# Patient Record
Sex: MALE | Race: WHITE | NOT HISPANIC OR LATINO | Employment: OTHER | URBAN - METROPOLITAN AREA
[De-identification: names, ages, dates, MRNs, and addresses within clinical notes are randomized per-mention and may not be internally consistent; named-entity substitution may affect disease eponyms.]

---

## 2017-01-10 ENCOUNTER — ALLSCRIPTS OFFICE VISIT (OUTPATIENT)
Dept: OTHER | Facility: OTHER | Age: 72
End: 2017-01-10

## 2017-02-11 ENCOUNTER — LAB CONVERSION - ENCOUNTER (OUTPATIENT)
Dept: OTHER | Facility: OTHER | Age: 72
End: 2017-02-11

## 2017-02-21 ENCOUNTER — ALLSCRIPTS OFFICE VISIT (OUTPATIENT)
Dept: OTHER | Facility: OTHER | Age: 72
End: 2017-02-21

## 2017-04-15 ENCOUNTER — ALLSCRIPTS OFFICE VISIT (OUTPATIENT)
Dept: OTHER | Facility: OTHER | Age: 72
End: 2017-04-15

## 2017-04-24 ENCOUNTER — LAB CONVERSION - ENCOUNTER (OUTPATIENT)
Dept: OTHER | Facility: OTHER | Age: 72
End: 2017-04-24

## 2017-04-24 LAB
PARASITE ID (HISTORICAL): NORMAL
TICK -LYME (BORRELIA SPP) DNA, QL, (HISTORICAL): DETECTED

## 2017-05-01 DIAGNOSIS — I10 ESSENTIAL (PRIMARY) HYPERTENSION: ICD-10-CM

## 2017-05-03 ENCOUNTER — GENERIC CONVERSION - ENCOUNTER (OUTPATIENT)
Dept: OTHER | Facility: OTHER | Age: 72
End: 2017-05-03

## 2017-05-03 LAB
FECAL GLOBIN BY IMMUNOCHEM (HISTORICAL): NORMAL
SOURCE (HISTORICAL): NORMAL

## 2017-05-22 ENCOUNTER — ALLSCRIPTS OFFICE VISIT (OUTPATIENT)
Dept: OTHER | Facility: OTHER | Age: 72
End: 2017-05-22

## 2017-10-24 ENCOUNTER — LAB CONVERSION - ENCOUNTER (OUTPATIENT)
Dept: OTHER | Facility: OTHER | Age: 72
End: 2017-10-24

## 2017-10-24 LAB
25(OH)D3 SERPL-MCNC: 42 NG/ML (ref 30–100)
A/G RATIO (HISTORICAL): 1.8 (CALC) (ref 1–2.5)
ALBUMIN SERPL BCP-MCNC: 4.1 G/DL (ref 3.6–5.1)
ALP SERPL-CCNC: 56 U/L (ref 40–115)
ALT SERPL W P-5'-P-CCNC: 14 U/L (ref 9–46)
AST SERPL W P-5'-P-CCNC: 17 U/L (ref 10–35)
BACTERIA UR QL AUTO: NORMAL /HPF
BASOPHILS # BLD AUTO: 0.7 %
BASOPHILS # BLD AUTO: 43 CELLS/UL (ref 0–200)
BILIRUB SERPL-MCNC: 0.6 MG/DL (ref 0.2–1.2)
BILIRUB UR QL STRIP: NEGATIVE
BUN SERPL-MCNC: 22 MG/DL (ref 7–25)
BUN/CREA RATIO (HISTORICAL): 15 (CALC) (ref 6–22)
CALCIUM SERPL-MCNC: 9.8 MG/DL (ref 8.6–10.3)
CHLORIDE SERPL-SCNC: 105 MMOL/L (ref 98–110)
CO2 SERPL-SCNC: 30 MMOL/L (ref 20–31)
COLOR UR: YELLOW
COMMENT (HISTORICAL): CLEAR
CREAT SERPL-MCNC: 1.47 MG/DL (ref 0.7–1.18)
DEPRECATED RDW RBC AUTO: 12.9 % (ref 11–15)
EGFR AFRICAN AMERICAN (HISTORICAL): 54 ML/MIN/1.73M2
EGFR-AMERICAN CALC (HISTORICAL): 46 ML/MIN/1.73M2
EOSINOPHIL # BLD AUTO: 146 CELLS/UL (ref 15–500)
EOSINOPHIL # BLD AUTO: 2.4 %
FECAL OCCULT BLOOD DIAGNOSTIC (HISTORICAL): NEGATIVE
GAMMA GLOBULIN (HISTORICAL): 2.3 G/DL (CALC) (ref 1.9–3.7)
GLUCOSE (HISTORICAL): 89 MG/DL (ref 65–99)
GLUCOSE (HISTORICAL): NEGATIVE
HCT VFR BLD AUTO: 45.2 % (ref 38.5–50)
HGB BLD-MCNC: 15.3 G/DL (ref 13.2–17.1)
HYALINE CASTS #/AREA URNS LPF: NORMAL /LPF
KETONES UR STRIP-MCNC: NEGATIVE MG/DL
LEUKOCYTE ESTERASE UR QL STRIP: NEGATIVE
LYMPHOCYTES # BLD AUTO: 2288 CELLS/UL (ref 850–3900)
LYMPHOCYTES # BLD AUTO: 37.5 %
MCH RBC QN AUTO: 30.7 PG (ref 27–33)
MCHC RBC AUTO-ENTMCNC: 33.8 G/DL (ref 32–36)
MCV RBC AUTO: 90.8 FL (ref 80–100)
MONOCYTES # BLD AUTO: 549 CELLS/UL (ref 200–950)
MONOCYTES (HISTORICAL): 9 %
NEUTROPHILS # BLD AUTO: 3074 CELLS/UL (ref 1500–7800)
NEUTROPHILS # BLD AUTO: 50.4 %
NITRITE UR QL STRIP: NEGATIVE
PH UR STRIP.AUTO: 6 [PH] (ref 5–8)
PLATELET # BLD AUTO: 248 THOUSAND/UL (ref 140–400)
PMV BLD AUTO: 10.6 FL (ref 7.5–12.5)
POTASSIUM SERPL-SCNC: 4.3 MMOL/L (ref 3.5–5.3)
PROSTATE SPECIFIC ANTIGEN TOTAL (HISTORICAL): 1.5 NG/ML
PROT UR STRIP-MCNC: NEGATIVE MG/DL
RBC # BLD AUTO: 4.98 MILLION/UL (ref 4.2–5.8)
RBC (HISTORICAL): NORMAL /HPF
SODIUM SERPL-SCNC: 141 MMOL/L (ref 135–146)
SP GR UR STRIP.AUTO: 1.02 (ref 1–1.03)
SQUAMOUS EPITHELIAL CELLS (HISTORICAL): NORMAL /HPF
TOTAL PROTEIN (HISTORICAL): 6.4 G/DL (ref 6.1–8.1)
TSH SERPL DL<=0.05 MIU/L-ACNC: 3.39 MIU/L (ref 0.4–4.5)
WBC # BLD AUTO: 6.1 THOUSAND/UL (ref 3.8–10.8)
WBC # BLD AUTO: NORMAL /HPF

## 2017-10-30 ENCOUNTER — ALLSCRIPTS OFFICE VISIT (OUTPATIENT)
Dept: OTHER | Facility: OTHER | Age: 72
End: 2017-10-30

## 2017-10-31 ENCOUNTER — ALLSCRIPTS OFFICE VISIT (OUTPATIENT)
Dept: OTHER | Facility: OTHER | Age: 72
End: 2017-10-31

## 2017-10-31 DIAGNOSIS — Z53.29 PROCEDURE AND TREATMENT NOT CARRIED OUT BECAUSE OF PATIENT'S DECISION FOR OTHER REASONS: ICD-10-CM

## 2017-10-31 DIAGNOSIS — E04.1 NONTOXIC SINGLE THYROID NODULE: ICD-10-CM

## 2017-10-31 DIAGNOSIS — N50.9 DISORDER OF MALE GENITAL ORGANS: ICD-10-CM

## 2017-10-31 DIAGNOSIS — Z13.6 ENCOUNTER FOR SCREENING FOR CARDIOVASCULAR DISORDERS: ICD-10-CM

## 2017-10-31 NOTE — PROGRESS NOTES
Assessment  1  Tick bite of lower back (911 4,E906 4) (E44 875M,Y23  XXXA)    Plan  Fatigue, PMH: Tick bite of groin    · (1) LYME ANTIBODY PROFILE W/REFLEX TO WESTERN BLOT; Status:Canceled;   Tick bite of lower back    · Doxycycline Hyclate 100 MG Oral Capsule; TAKE 2 CAPSULE Once    Discussion/Summary    Tick bite right lower back - area cleaned, tick removed and sent to path per patient request, will treat with doxy 100 mg x 2 now with food, will call with path report  as needed  Possible side effects of new medications were reviewed with the patient/guardian today  The treatment plan was reviewed with the patient/guardian  The patient/guardian understands and agrees with the treatment plan      Chief Complaint  Pt presents to the office with c/o a tick on his back  due 02/02/18due 10/06/17DUE 08/25/18      History of Present Illness  HPI: Patient here for tick bite  Wife found it this morning on patient  Patient has a long history of Lyme and requesting tick be sent out  Active Problems  1  Advance directive in chart (V49 89) (Z78 9)   2  Bilateral hearing loss (389 9) (H91 93)   3  Colon cancer screening (V76 51) (Z12 11)   4  Colonoscopy (Fiberoptic) Screening   5  Creatinine elevation (790 99) (R79 89)   6  Fatigue (780 79) (R53 83)   7  Hypertension (401 9) (I10)   8  Laboratory examination ordered as part of a routine general medical examination   (V72 62) (Z00 00)   9  Need for prophylactic vaccination and inoculation against influenza (V04 81) (Z23)   10  Nervousness (799 21) (R45 0)   11  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)   12  Obstructive sleep apnea (327 23) (G47 33)   13  Osteopenia (733 90) (M85 80)   14  Screening for genitourinary condition (V81 6) (Z13 89)   15  Special screening examination for neoplasm of prostate (V76 44) (Z12 5)   16  Uncomplicated alcohol abuse (305 00) (F10 10)   17  Visit For: Screening Exam Neurological Disorders (V80 09)    Past Medical History  1  History of Allergic Reaction (995 3)   2  History of Bell's Palsy Due To Lyme Disease (351 0)   3  History of Blood pressure elevated (401 9) (I10)   4  History of Calculus of salivary gland (527 5) (K11 5)   5  History of Calculus of salivary gland (527 5) (K11 5)   6  History of Decreased body height (781 91) (R29 890)   7  History of abdominal pain (V13 89) (Z87 898)   8  History of abnormal weight loss (V13 89) (Z87 898)   9  History of atrial fibrillation (V12 59) (Z86 79)   10  History of blurred vision (V12 49) (Z86 69)   11  History of lymphadenitis (V13 89) (Z87 898)   12  History of Lyme disease (088 81) (A69 20)   13  History of Mid back pain (724 5) (M54 9)   14  History of Multiple Nonvenomous Insect Bites (919 4)   15  History of Need for pneumococcal vaccination (V03 82) (Z23)   16  Need for prophylactic vaccination and inoculation against influenza (V04 81) (Z23)   17  History of Need for vaccine for TD (tetanus-diphtheria) (V06 5) (Z23)   18  History of Open Wound Of The Foot (892 0)   19  History of Palpitations (785 1) (R00 2)   20  History of Sinus Tachycardia (427 89)   21  History of Tachycardia (785 0) (R00 0)   22  History of Testes Mass (___cm) (608 89)   23  Tick bite of groin (911 4,E906 4) (A73 796K,P56  XXXA)   24  History of Tick Bites (919 4)  Active Problems And Past Medical History Reviewed: The active problems and past medical history were reviewed and updated today  Family History  Mother    1  Family history of Dementia   2  Family history of Mother  At Age 80   3  Family history of Uterine Cancer (V16 49)  Father    4  Family history of cardiac disorder (V17 49) (Z82 49)   5  Family history of thyroid disease (V18 19) (Z83 49)   6  Patient's father is  (V24 11) (Z80 80)  Family History Reviewed: The family history was reviewed and updated today  Social History   · Advance directive in chart (V49 89) (Z78 9)   · Drinks coffee   · Drinks 1 cup a day  · Former smoker (C43 33) (G11 178)   · when he was a teenager   · Has smoke detectors   · No drug use   · Occupation   · 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   · Social alcohol use (Z78 9)   · Drinks 2 beers a week   · Denied: History of Uses Safety Equipment - Seatbelts  The social history was reviewed and updated today  The social history was reviewed and is unchanged  Surgical History  1  Denied: History Of Prior Surgery  Surgical History Reviewed: The surgical history was reviewed and updated today  Current Meds   1  HydroCHLOROthiazide 25 MG Oral Tablet; TAKE 1 TABLET BY MOUTH EVERY DAY   WITH LISINOPRIL; Therapy: 01FGK1628 to (72 240 26 09)  Requested for: 77Krf0724; Last   Rx:12Uhd4296 Ordered   2  Lisinopril 20 MG Oral Tablet; Take one tablet daily; Therapy: 34ZDQ8908 to (Evaluate:29Mqh2685)  Requested for: 87Osg4379; Last   Rx:36Vnw6280 Ordered    The medication list was reviewed and updated today  Allergies  1  Toprol XL TB24  2  No Known Environmental Allergies   3  No Known Food Allergies    Vitals   Recorded: 48ZUN2496 01:33PM   Temperature 97 9 F, Oral   Heart Rate 76, R Radial   Pulse Quality Normal, R Radial   Respiration Quality Normal   Respiration 16   Systolic 067, RUE, Sitting   Diastolic 68, RUE, Sitting   Height 5 ft 9 in   Weight 162 lb    BMI Calculated 23 92   BSA Calculated 1 89     Physical Exam    Constitutional   General appearance: No acute distress, well appearing and well nourished  Pulmonary   Respiratory effort: No increased work of breathing or signs of respiratory distress  Auscultation of lungs: Clear to auscultation, equal breath sounds bilaterally, no wheezes, no rales, no rhonci  Cardiovascular   Palpation of heart: Normal PMI, no thrills  Auscultation of heart: Normal rate and rhythm, normal S1 and S2, without murmurs      Skin   Skin and subcutaneous tissue: Abnormal  -- right side lower back with engroged black tight, wheel like lesion surround that blanches on contact     Psychiatric   Orientation to person, place and time: Normal     Mood and affect: Normal          Future Appointments    Date/Time Provider Specialty Site   10/31/2017 01:30 PM Jaron Santiago DO [address]     Signatures   Electronically signed by : Lux Martinez [facility]; Oct 30 2017  1:57PM EST                       (Author)    Electronically signed by : MARCIE Dela Cruz ; Oct 30 2017  5:10PM EST                       (Author)

## 2017-11-06 ENCOUNTER — LAB CONVERSION - ENCOUNTER (OUTPATIENT)
Dept: OTHER | Facility: OTHER | Age: 72
End: 2017-11-06

## 2017-11-06 LAB
PARASITE ID (HISTORICAL): NORMAL
TICK -LYME (BORRELIA SPP) DNA, QL, (HISTORICAL): DETECTED

## 2017-11-07 ENCOUNTER — GENERIC CONVERSION - ENCOUNTER (OUTPATIENT)
Dept: OTHER | Facility: OTHER | Age: 72
End: 2017-11-07

## 2017-11-13 ENCOUNTER — HOSPITAL ENCOUNTER (OUTPATIENT)
Dept: RADIOLOGY | Facility: HOSPITAL | Age: 72
Discharge: HOME | End: 2017-11-13
Payer: COMMERCIAL

## 2017-11-13 DIAGNOSIS — Z13.6 ENCOUNTER FOR SCREENING FOR CARDIOVASCULAR DISORDERS: ICD-10-CM

## 2017-11-13 DIAGNOSIS — Z53.29 PROCEDURE AND TREATMENT NOT CARRIED OUT BECAUSE OF PATIENT'S DECISION FOR OTHER REASONS: ICD-10-CM

## 2017-11-13 DIAGNOSIS — E04.1 NONTOXIC SINGLE THYROID NODULE: ICD-10-CM

## 2017-11-13 PROCEDURE — 76706 US ABDL AORTA SCREEN AAA: CPT

## 2017-11-13 PROCEDURE — 76536 US EXAM OF HEAD AND NECK: CPT

## 2017-11-16 ENCOUNTER — ALLSCRIPTS OFFICE VISIT (OUTPATIENT)
Dept: OTHER | Facility: OTHER | Age: 72
End: 2017-11-16

## 2017-11-27 ENCOUNTER — GENERIC CONVERSION - ENCOUNTER (OUTPATIENT)
Dept: OTHER | Facility: OTHER | Age: 72
End: 2017-11-27

## 2017-11-29 ENCOUNTER — HOSPITAL ENCOUNTER (OUTPATIENT)
Dept: RADIOLOGY | Facility: HOSPITAL | Age: 72
Discharge: HOME | End: 2017-11-29
Payer: COMMERCIAL

## 2017-11-29 DIAGNOSIS — E04.1 NONTOXIC SINGLE THYROID NODULE: ICD-10-CM

## 2017-11-29 LAB
LYME 18 KD IGG (HISTORICAL): REACTIVE
LYME 23 KD IGG (HISTORICAL): ABNORMAL
LYME 23 KD IGM (HISTORICAL): REACTIVE
LYME 28 KD IGG (HISTORICAL): ABNORMAL
LYME 30 KD IGG (HISTORICAL): ABNORMAL
LYME 39 KD IGG (HISTORICAL): REACTIVE
LYME 39 KD IGM (HISTORICAL): ABNORMAL
LYME 41 KD IGG (HISTORICAL): REACTIVE
LYME 41 KD IGM (HISTORICAL): ABNORMAL
LYME 45 KD IGG (HISTORICAL): REACTIVE
LYME 58 KD IGG (HISTORICAL): REACTIVE
LYME 66 KD IGG (HISTORICAL): ABNORMAL
LYME 93 KD IGG (HISTORICAL): ABNORMAL
LYME IGG (HISTORICAL): POSITIVE
LYME IGM (HISTORICAL): NEGATIVE

## 2017-11-29 PROCEDURE — 10022 HB FNA W/IMAGE: CPT

## 2017-11-29 PROCEDURE — 88172 CYTP DX EVAL FNA 1ST EA SITE: CPT | Performed by: FAMILY MEDICINE

## 2017-11-29 PROCEDURE — 76942 ECHO GUIDE FOR BIOPSY: CPT

## 2017-11-29 PROCEDURE — 88173 CYTOPATH EVAL FNA REPORT: CPT | Performed by: FAMILY MEDICINE

## 2017-11-29 RX ORDER — LIDOCAINE HYDROCHLORIDE 10 MG/ML
3 INJECTION, SOLUTION INFILTRATION; PERINEURAL ONCE
Status: COMPLETED | OUTPATIENT
Start: 2017-11-29 | End: 2017-11-29

## 2017-11-29 RX ADMIN — LIDOCAINE HYDROCHLORIDE 3 ML: 10 INJECTION, SOLUTION INFILTRATION; PERINEURAL at 09:45

## 2017-12-11 ENCOUNTER — GENERIC CONVERSION - ENCOUNTER (OUTPATIENT)
Dept: OTHER | Facility: OTHER | Age: 72
End: 2017-12-11

## 2018-01-10 NOTE — MISCELLANEOUS
Message   Recorded as Task   Date: 10/22/2016 05:12 PM, Created By: Cheryl Maria   Task Name: Call Back   Assigned To: Abbey Berry   Regarding Patient: Vanda Brandon, Status: Active   CommentArnaldo Mahoney - 22 Oct 2016 5:12 PM     TASK CREATED  Please call patient to tell him that his ultrasound of his thyroid is unchanged  This is no change since 2014   Regine Lu - 24 Oct 2016 9:38 AM     TASK REPLIED TO: Previously Assigned To Regine Lu  Pt was informed   MRP        Signatures   Electronically signed by : Willam Higginbotham DO; Oct 24 2016 12:52PM EST                       (Author)

## 2018-01-11 NOTE — PROGRESS NOTES
Assessment    1  Encounter for preventive health examination (V70 0) (Z00 00)    Plan  Colon cancer screening    · (Q) FECAL GLOBIN BY IMMUNOCHEM  (MEDICARE); Status:Permanent Deferral -  Retrospective By Protocol Authorization,Patient request;    · (1) OCCULT BLOOD, FECAL IMMUNOCHEMICAL TEST ; every 1 year; Next 16AIZ8276;  Status:Discontinued  Health Maintenance    · Medicare Annual Wellness Visit; Status:Complete - Retrospective By Protocol  Authorization;   Done: 56QAV1352 09:10AM   · Call [phone] if: You have any warning signs of skin cancer ; Status:Complete;    Done: 44BSL7525   · Seek Immediate Medical Attention if: You experience a new kind of chest pain (angina)  or pressure ; Status:Complete;   Done: 50OLT4247   · Eat a low fat and low cholesterol diet ; Status:Complete;   Done: 12HPW8368   · The plan of care for falls is detailed in the plan and/or discussion section of today's note ;  Status:Complete;   Done: 21EJX5510   · There are many exercise options for seniors ; Status:Complete;   Done: 71DCC4785   · There ways to avoid falling ; Status:Complete;   Done: 36TAA2911   · These are things you can do to prevent falls in and around the home ; Status:Complete;    Done: 27BJK8490   · We recommend that you follow the "Mediterranean diet "; Status:Complete;   Done:  36APT0250   · Medicare Annual Wellness Visit ; every 1 year; Last 25Aug2016; Next 25Aug2017;  Status:Active   · PHQ-2 Adult Depression Screening ; every 1 year; Last 25Aug2016; Next 25Aug2017;  Status:Active  Hypertension    · Lisinopril 20 MG Oral Tablet; TAKE 1 TABLET AT BEDTIME   · (1) CBC/PLT/DIFF; Status:Active; Requested for:01Oct2016;    · (1) COMPREHENSIVE METABOLIC PANEL; Status:Active; Requested for:01Oct2016;    · (1) TSH WITH FT4 REFLEX; Status:Active;  Requested for:01Oct2016;    · (1) URINALYSIS (will reflex a microscopy if leukocytes, occult blood, protein or nitrites  are not within normal limits); Status:Active; Requested for:01Oct2016;   Screening for glaucoma    · *VB - Glaucoma Screen ; every 1 year; Next U868558; Status:Discontinued  Special screening examination for neoplasm of prostate    · (1) PSA (SCREEN) (Dx V76 44 Screen for Prostate Cancer); Status:Active; Requested  for:01Oct2016;    · Follow-up visit in 6 weeks Evaluation and Treatment  Follow-up 30 minutes with EKG   Status: Hold For - Scheduling  Requested for: 31Qdn7381         Patient's Medicare visit is done on the following is the summary:    #1 patient knows he could get a shingles shot at a pharmacy      #2 patient will do the Ensure fecal occult testing rather than a colonoscopy      Patient will return in about 6 weeks to check his blood pressure, for an EKG, and ago over his blood work        Discussion/Summary  Impression: Subsequent Board [employer] Boat Visit, with preventive exam as well as age and risk appropriate counseling completed  Cardiovascular screening and counseling: screening is current  Diabetes screening and counseling: screening is current  Colorectal cancer screening and counseling: due for fecal occult blood testing  Prostate cancer screening and counseling: screening is current  Osteoporosis screening and counseling: screening not indicated  Abdominal aortic aneurysm screening and counseling: screening not indicated  HIV screening and counseling: screening not indicated  Immunizations: the patient declines the influenza vaccination, the lifetime pneumococcal vaccine has been completed, Needs Prevnar, hepatitis B vaccination series is not indicated at this time due to the patient's low risk of leonor the disease, the risks and benefits of the Zostavax vaccine were discussed with the patient, the risks and benefits of the Td vaccine were discussed with the patient, Td vaccination status is unknown, the risks and benefits of the Tdap vaccine were discussed with the patient and Tdap vaccination status is unknown  Advance Directive Planning: complete and up to date  Advice and education were given regarding increasing physical activity  (none) Medical Equipment/Suppliers: none  Patient Discussion: plan discussed with the patient, follow-up visit needed in one year  Chief Complaint  Patient presents to the office today for an AWV  Colonoscopy is deferred and IDM is UTD today  Fecal Occult Blood test is due and test was given today  EKG is due after 10/2016  Yellow sheet is done for 2016  AWV is due today  Advance Directives  Advance Directive [facility]:   YES - Patient has an advance health care directive  The patient has a living will located  in patient's home and with patient's physician  Durable Power of  For Healthcare:    Name: Dinesh Quintero   Relationship: Wife   Phone (cell): [phone]   Alternate Health Care Proxy:    Name: Chris Funk   Relationship: Daughter   Phone (cell): [phone]      History of Present Illness  Welcome to Medicare and Wellness Visits: The patient is being seen for the subsequent annual wellness visit  Medicare Screening and Risk Factors   Hospitalizations: no previous hospitalizations  Once per lifetime medicare screening tests: ECG ~U() and AAA screening US has not yet been done  Medicare Screening Tests Risk Questions   Abdominal aortic aneurysm risk assessment: none indicated  Osteoporosis risk assessment: none indicated  HIV risk assessment: none indicated  Drug and Alcohol Use: The patient has never smoked cigarettes  The patient reports rare alcohol use  Alcohol concern:   The patient has no concerns about alcohol abuse  He has previously used illicit drugs  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, methamphetamine, oral opioids and hallucinogens  Diet and Physical Activity: Current diet includes well balanced meals and limited junk food  He exercises daily  Exercise: walking 60 minutes per day     Mood Disorder and Cognitive Impairment Screening: He denies feeling down, depressed, or hopeless over the past two weeks  He denies feeling little interest or pleasure in doing things over the past two weeks  Cognitive impairment screening: denies difficulty learning/retaining new information, denies difficulty handling complex tasks, denies difficulty with reasoning, denies difficulty with spatial ability and orientation, denies difficulty with language and denies difficulty with behavior  Functional Ability/Level of Safety: Hearing is slightly decreased and a hearing aid is not used  He reports hearing difficulties  The patient is currently able to do activities of daily living without limitations, able to do instrumental activities of daily living without limitations, able to participate in social activities without limitations and able to drive without limitations  Activities of daily living details: does not need help using the phone, no transportation help needed, does not need help shopping, no meal preparation help needed, does not need help doing housework, does not need help doing laundry, does not need help managing medications and does not need help managing money  Fall risk factors:  alcohol use, visual impairment and Wears Glasses, but no polypharmacy, no mobility impairment, no antidepressant use, no deconditioning, no postural hypotension, no sedative use, no urinary incontinence, no antihypertensive use, no cognitive impairment, up and go test was normal and no previous fall  Home safety risk factors:  no unfamiliar surroundings, no loose rugs, no poor household lighting, no uneven floors, no household clutter, grab bars in the bathroom and handrails on the stairs  Advance Directives: Advance directives: living will and advance directives  Co-Managers and Medical Equipment/Suppliers: See Patient Care Team      Active Problems    1  Colon cancer screening (V76 51) (Z12 11)   2  Colonoscopy (Fiberoptic) Screening   3   Creatinine elevation (790 99) (R79 89)   4  Hypertension (401 9) (I10)   5  Laboratory examination ordered as part of a routine general medical examination   (V72 62) (Z00 00)   6  Need for prophylactic vaccination and inoculation against influenza (V04 81) (Z23)   7  Nervousness (799 21) (R45 0)   8  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)   9  Obstructive sleep apnea (327 23) (G47 33)   10  Osteopenia (733 90) (M85 80)   11  Screening for genitourinary condition (V81 6) (Z13 89)   12  Screening for glaucoma (V80 1) (Z13 5)   13  Special screening examination for neoplasm of prostate (V76 44) (Z12 5)   14  Uncomplicated alcohol abuse (305 00) (F10 10)   15   Visit For: Screening Exam Neurological Disorders (V80 09)    Past Medical History    · History of Allergic Reaction (995 3)   · History of Bell's Palsy Due To Lyme Disease (351 0)   · History of Blood pressure elevated (401 9) (I10)   · History of Calculus of salivary gland (527 5) (K11 5)   · History of Calculus of salivary gland (527 5) (K11 5)   · History of Decreased body height (781 91) (R29 890)   · History of abdominal pain (V13 89) (B42 351)   · History of abnormal weight loss (V13 89) (E14 202)   · History of atrial fibrillation (V12 59) (Z86 79)   · History of blurred vision (V12 49) (Z86 69)   · History of lymphadenitis (V13 89) (Z79 298)   · History of Lyme disease (088 81) (A69 20)   · History of Multiple Nonvenomous Insect Bites (919 4)   · History of Need for pneumococcal vaccination (V03 82) (Z23)   · Need for prophylactic vaccination and inoculation against influenza (V04 81) (Z23)   · History of Need for vaccine for TD (tetanus-diphtheria) (V06 5) (Z23)   · History of Open Wound Of The Foot (892 0)   · History of Palpitations (785 1) (R00 2)   · History of Sinus Tachycardia (427 89)   · History of Tachycardia (785 0) (R00 0)   · History of Testes Mass (___cm) (608 89)   · History of Tick Bites (919 4)    Surgical History    · Denied: History Of Prior Surgery    Family History  Mother    · Family history of Dementia   · Family history of Mother  At Age 80   · Family history of Uterine Cancer (V16 49)  Father    · Family history of cardiac disorder (V17 49) (Z82 49)   · Family history of thyroid disease (V18 19) (Z83 49)   · Patient's father is  (V24 11) (Z80 80)    Social History    · Alcohol use   · Social usage drinks 1 beer 2-3 times a week  · Drinks coffee   · Drinks 1 cup a day  · Former smoker (J79 96) (N52 219)   · when he was a teenager   · Has smoke detectors   · No drug use   · Occupation   · 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   · Denied: History of Uses Safety Equipment - Seatbelts    Current Meds   1  Lisinopril 20 MG Oral Tablet; TAKE 1 TABLET AT BEDTIME; Therapy: 20IKW3998 to (Evaluate:58Qvy3219)  Requested for: 75DYP6121; Last   Rx:95Cfa0269 Ordered    Allergies    1  Toprol XL TB24    2  No Known Environmental Allergies   3  No Known Food Allergies    Immunizations   1    Influenza  Temporarily Deferred: Pt requests deferral    Pneumo Other  15-Apr-2010    Td/DT  15-Apr-2010     Vitals  Signs    Systolic: 826  Diastolic: 071  Heart Rate: 60  Respiration: 12  Height: 5 ft 9 in  Weight: 162 lb 3 04 oz  BMI Calculated: 23 95  BSA Calculated: 1 89    Results/Data  PHQ-2 Adult Depression Screening 42Cqy3926 09:12AM User, Ahs     Test Name Result Flag Reference   PHQ-2 Adult Depression Score 0     Over the last two weeks, how often have you been bothered by any of the following problems? Little interest or pleasure in doing things: Not at all - 0  Feeling down, depressed, or hopeless: Not at all - 0   PHQ-2 Adult Depression Screening Negative       Medicare Annual Wellness Visit 24XIQ1699 09:10AM Reather Alise     Test Name Result Flag Reference   [address] VISIT 52FGX1224         Health Management  Colonoscopy (Fiberoptic) Screening   (every) FECAL GLOBIN BY IMMUNOCHEM   (MEDICARE); every 1 year; Last  30KTY2751; Next Due: 96KPV8922; Overdue  Hypertension   EKG/ECG- POC; every 1 year; Last 01BGH0572; Next Due: 91DMF6391; Near Due  Health Maintenance   Medicare Annual Wellness Visit; every 1 year; Last 27Joc5118; Next Due: 61Dld9741; Active  PHevery-2 Adult Depression Screening; every 1 year; Last 89Oeb6746; Next Due:  60Exz6168;  Active    Signatures   Electronically signed by : Joseph Ramirez DO; Aug 25 2016  9:37AM EST                       (Author)

## 2018-01-12 VITALS
HEIGHT: 69 IN | RESPIRATION RATE: 16 BRPM | BODY MASS INDEX: 23.82 KG/M2 | HEART RATE: 72 BPM | SYSTOLIC BLOOD PRESSURE: 130 MMHG | WEIGHT: 160.8 LBS | DIASTOLIC BLOOD PRESSURE: 92 MMHG

## 2018-01-12 NOTE — MISCELLANEOUS
Message   Recorded as Task   Date: 11/28/2016 09:14 AM, Created By: Jeanna Cui   Task Name: Follow Up   Assigned To: Katya Monzon   Regarding Patient: Keyanna Lr, Status: Active   CommentAlycia Nap - 28 Nov 2016 9:14 AM     TASK CREATED  Caller: Self; General Medical Question; [phone] (Home)  patient called with his blood pressure this morning  167/110   Katya Monzon - 28 Nov 2016 1:20 PM     TASK REPLIED TO: Previously Assigned To Katya Monzon  I discussed wtih Socorro Hand, let him know we are happy it is coming down and to continue to current regimen and stay away from Michael Joel - 28 Nov 2016 2:12 PM     TASK REPLIED TO: Previously Assigned To Jeanna Cui  pt informed        Signatures   Electronically signed by : Mona Bernal, [facility]; Nov 28 2016  3:40PM EST                       (Author)

## 2018-01-13 VITALS
BODY MASS INDEX: 24.41 KG/M2 | RESPIRATION RATE: 16 BRPM | SYSTOLIC BLOOD PRESSURE: 158 MMHG | HEIGHT: 69 IN | HEART RATE: 84 BPM | WEIGHT: 164.8 LBS | DIASTOLIC BLOOD PRESSURE: 90 MMHG

## 2018-01-13 VITALS
DIASTOLIC BLOOD PRESSURE: 70 MMHG | HEIGHT: 69 IN | WEIGHT: 161.8 LBS | SYSTOLIC BLOOD PRESSURE: 122 MMHG | HEART RATE: 68 BPM | BODY MASS INDEX: 23.96 KG/M2 | RESPIRATION RATE: 16 BRPM

## 2018-01-13 VITALS
WEIGHT: 160.9 LBS | SYSTOLIC BLOOD PRESSURE: 110 MMHG | BODY MASS INDEX: 23.83 KG/M2 | DIASTOLIC BLOOD PRESSURE: 60 MMHG | HEIGHT: 69 IN | RESPIRATION RATE: 16 BRPM | HEART RATE: 80 BPM

## 2018-01-13 VITALS
HEIGHT: 69 IN | DIASTOLIC BLOOD PRESSURE: 72 MMHG | BODY MASS INDEX: 24.13 KG/M2 | SYSTOLIC BLOOD PRESSURE: 130 MMHG | RESPIRATION RATE: 16 BRPM | WEIGHT: 162.9 LBS | HEART RATE: 80 BPM

## 2018-01-14 VITALS
SYSTOLIC BLOOD PRESSURE: 124 MMHG | TEMPERATURE: 97.9 F | WEIGHT: 162 LBS | RESPIRATION RATE: 16 BRPM | HEIGHT: 69 IN | BODY MASS INDEX: 23.99 KG/M2 | HEART RATE: 76 BPM | DIASTOLIC BLOOD PRESSURE: 68 MMHG

## 2018-01-22 VITALS
HEART RATE: 72 BPM | DIASTOLIC BLOOD PRESSURE: 68 MMHG | BODY MASS INDEX: 24.07 KG/M2 | WEIGHT: 162.5 LBS | RESPIRATION RATE: 16 BRPM | SYSTOLIC BLOOD PRESSURE: 124 MMHG | HEIGHT: 69 IN

## 2018-01-22 VITALS
BODY MASS INDEX: 24.05 KG/M2 | DIASTOLIC BLOOD PRESSURE: 72 MMHG | RESPIRATION RATE: 16 BRPM | HEART RATE: 62 BPM | SYSTOLIC BLOOD PRESSURE: 118 MMHG | HEIGHT: 69 IN | WEIGHT: 162.4 LBS

## 2018-01-23 ENCOUNTER — GENERIC CONVERSION - ENCOUNTER (OUTPATIENT)
Dept: OTHER | Facility: OTHER | Age: 73
End: 2018-01-23

## 2018-01-23 ENCOUNTER — ALLSCRIPTS OFFICE VISIT (OUTPATIENT)
Dept: OTHER | Facility: OTHER | Age: 73
End: 2018-01-23

## 2018-01-23 NOTE — MISCELLANEOUS
Message   Recorded as Task   Date: 11/27/2017 01:57 PM, Created By: System   Task Name: Schedule Appointment   Assigned To: Abbey Berry   Regarding Patient: Danyel Aleman, Status: Active   Comment:    System - 27 Nov 2017 1:57 PM        Sharona Harper - 28 Nov 2017 7:21 AM     TASK REASSIGNED: Previously Assigned To Abbey Berry  Please call patient to schedule an appointment     After this appointment is scheduled please reply back to [facility] team    Marie Victor - 28 Nov 2017 8:52 AM     TASK  [facility] - 28 Nov 2017 1:58 PM     TASK EDITED  11/28 left .[phone]   Marie Victor - 05 Dec 2017 9:38 AM     TASK EDITED  12/5 busy signal x2 [phone]   Loretta Hector - 11 Dec 2017 2:32 PM     TASK EDITED  2 attempts made, no appt scheduled  Please FU w/ pt and have pt call Central Scheduling to schedule     Abbey Carrillo - 11 Dec 2017 3:27 PM     TASK REPLIED TO: Previously Assigned To Adalberto   Electronically signed by : Ignacia Joe DO; Dec 11 2017  5:03PM EST                       (Author)

## 2018-01-24 NOTE — PROGRESS NOTES
Assessment   1  Screening for AAA (aortic abdominal aneurysm) (V81 2) (Z13 6)  2  Encounter for preventive health examination (V70 0) (Z00 00)    Plan  Cervical smear refused because of other reasons, Screening for AAA (aortic abdominal  aneurysm)    · US ABDOMINAL AORTA SCREENING AAA; Status:Active; Requested for:31Oct2017; Health Maintenance    · Medicare Annual Wellness Visit; Status:Complete - Retrospective By Protocol  Authorization;   Done: 19JFM9772 01:45PM   · Call [phone] if: You have any warning signs of skin cancer ; Status:Complete;    Done: 21VHM1138   · Seek Immediate Medical Attention if: You experience a new kind of chest pain (angina)  or pressure ; Status:Complete;   Done: 46CTD5991   · Eat a low fat and low cholesterol diet ; Status:Complete;   Done: 95LYA7090   · The plan of care for falls is detailed in the plan and/or discussion section of today's note ;  Status:Complete;   Done: 31Oct2017   · There are many exercise options for seniors ; Status:Complete;   Done: 93KYZ3426   · There ways to avoid falling ; Status:Complete;   Done: 72CMZ0338   · These are things you can do to prevent falls in and around the home ; Status:Complete;    Done: 31Oct2017   · We recommend that you follow the "Mediterranean diet "; Status:Complete;   Done:  55RXY2448   · Medicare Annual Wellness Visit ; every 1 year; Last 31Oct2017; Next 10IVG5652;  Status:Active  Hypertension    · EKG/ECG- POC; Status:Resulted - Requires Verification,Retrospective By Protocol  Authorization;   Done: 12WVL2491 01:45PM   · EKG/ECG- POC ; every 1 year; Last 17SOH2988; Next 53UTL5608; Status:Active  Tick bite of lower back    · (Q) TICK ID W/REFLEX TO LYME DISEASE DNA; Status:Active;  Requested  for:30Oct2017;              Patient's Medicare visit is done on the following is the summary:    #1 patient knows he could get a shingles shot at a pharmacy      #2 patient will do the Ensure fecal occult testing rather than a colonoscopy     Discussion/Summary    Patient's AAA was negative  He will be informed1    Impression: Subsequent Annual Wellness Visit, with preventive exam as well as age and risk appropriate counseling completed  Cardiovascular screening and counseling: screening is current  Diabetes screening and counseling: screening is current  Colorectal cancer screening and counseling: screening is current  Prostate cancer screening and counseling: screening is current  Osteoporosis screening and counseling: screening not indicated  Abdominal aortic aneurysm screening and counseling: Dx - V81 2 Screen for CV Disorder  HIV screening and counseling: screening is current  Hepatitis C Screening: the patient was counseled on Hepatitis C screening  The patient declinesHepatitis C screening  Immunizations: the patient declines the influenza vaccination, the lifetime pneumococcal vaccine has been completed, Needs Prevnar, hepatitis B vaccination series is not indicated at this time due to the patient's low risk of leonor the disease, the risks and benefits of the Zostavax vaccine were discussed with the patient, the risks and benefits of the Td vaccine were discussed with the patient, Td vaccination status is unknown, the risks and benefits of the Tdap vaccine were discussed with the patient and Tdap vaccination status is unknown  Advance Directive Planning: complete and up to date  Advice and education were given regarding increasing physical activity  (none) Medical Equipment/Suppliers: none  Patient Discussion: plan discussed with the patient, follow-up visit needed in one year  1 Amended By: Oleg Lucio; Nov 14 2017 5:22 PM EST    Chief Complaint  Pt presents here for an AWV appointment;    AWV due 08/25/2017  colon due 02/2018  EKG due 10/2017      History of Present Illness  Welcome to Medicare and Wellness Visits: The patient is being seen for the subsequent annual wellness visit     Medicare Screening and Risk Factors   Hospitalizations: no previous hospitalizations  Once per lifetime medicare screening tests: ECG and AAA screening US has not yet been done  Medicare Screening Tests Risk Questions   Abdominal aortic aneurysm risk assessment: over 72years of age  Osteoporosis risk assessment:  and alcohol use  HIV risk assessment: none indicated  Drug and Alcohol Use: The patient is a former cigarette smoker and quit smoking 1968  The patient reports occasional alcohol use and drinking 2-4 cocktails drinks per week  Alcohol concern:   The patient has no concerns about alcohol abuse  He has previously used illicit drugs  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, cocaine, methamphetamine and hallucinogens  Diet and Physical Activity: Current diet includes well balanced meals and limited junk food  The patient does not exercise  Mood Disorder and Cognitive Impairment Screening: He denies feeling down, depressed, or hopeless over the past two weeks  He denies feeling little interest or pleasure in doing things over the past two weeks  Cognitive impairment screening: denies difficulty learning/retaining new information, denies difficulty handling complex tasks, denies difficulty with reasoning, denies difficulty with spatial ability and orientation, denies difficulty with language and denies difficulty with behavior  Functional Ability/Level of Safety: Hearing is slightly decreased  He reports hearing difficulties  He does not use a hearing aid  The patient is currently able to do activities of daily living without limitations, able to participate in social activities without limitations and able to drive without limitations   Activities of daily living details: does not need help using the phone, no transportation help needed, does not need help shopping, no meal preparation help needed, does not need help doing housework, does not need help doing laundry, does not need help managing medications and does not need help managing money  Injury History: no polypharmacy, alcohol use, no mobility impairment, no antidepressant use, no deconditioning, no postural hypotension, no sedative use, no visual impairment, no urinary incontinence, antihypertensive use, no cognitive impairment, up and go test was normal and no previous fall  Home safety risk factors:  no grab bars in the bathroom, but no unfamiliar surroundings, no loose rugs, no poor household lighting, no uneven floors, no household clutter and handrails on the stairs  Advance Directives: Advance directives: living will  end of life decisions were reviewed with the patient and I agree with the patient's decisions  Co-Managers and Medical Equipment/Suppliers: See Patient Care Team      Patient Care Team    Care Team Member Role Specialty Office Number   Jeison Lav   [phone]     Active Problems   1  Advance directive in chart (V49 89) (Z78 9)  2  Bilateral hearing loss (389 9) (H91 93)  3  Colon cancer screening (V76 51) (Z12 11)  4  Colonoscopy (Fiberoptic) Screening  5  Creatinine elevation (790 99) (R79 89)  6  Fatigue (780 79) (R53 83)  7  Hypertension (401 9) (I10)  8  Laboratory examination ordered as part of a routine general medical examination   (V72 62) (Z00 00)  9  Need for prophylactic vaccination and inoculation against influenza (V04 81) (Z23)  10  Nervousness (799 21) (R45 0)  11  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)  12  Obstructive sleep apnea (327 23) (G47 33)  13  Osteopenia (733 90) (M85 80)  14  Screening for genitourinary condition (V81 6) (Z13 89)  15  Special screening examination for neoplasm of prostate (V76 44) (Z12 5)  16  Tick bite of lower back (911 4,E906 4) (J35 217O,L99  XXXA)  17  Uncomplicated alcohol abuse (305 00) (F10 10)  18   Visit For: Screening Exam Neurological Disorders (V80 09)    Past Medical History    · History of Allergic Reaction (995 3)   · History of Bell's Palsy Due To Lyme Disease (351 0)   · History of Blood pressure elevated (401 9) (I10)   · History of Calculus of salivary gland (527 5) (K11 5)   · History of Calculus of salivary gland (527 5) (K11 5)   · History of Decreased body height (781 91) (R29 890)   · History of abdominal pain (V13 89) (U49 712)   · History of abnormal weight loss (V13 89) (A88 418)   · History of atrial fibrillation (V12 59) (Z86 79)   · History of blurred vision (V12 49) (Z86 69)   · History of lymphadenitis (V13 89) (O86 804)   · History of Lyme disease (088 81) (A69 20)   · History of Mid back pain (724 5) (M54 9)   · History of Multiple Nonvenomous Insect Bites (919 4)   · History of Need for pneumococcal vaccination (V03 82) (Z23)   · Need for prophylactic vaccination and inoculation against influenza (V04 81) (Z23)   · History of Need for vaccine for TD (tetanus-diphtheria) (V06 5) (Z23)   · History of Open Wound Of The Foot (892 0)   · History of Palpitations (785 1) (R00 2)   · History of Sinus Tachycardia (427 89)   · History of Tachycardia (785 0) (R00 0)   · History of Testes Mass (___cm) (317 68)   · Tick bite of groin (911 4,E906 4) (E74 610F,Y37  Tona Yoan)   · History of Tick Bites (919 4)    Surgical History    · Denied: History Of Prior Surgery    Family History  Mother    · Family history of Dementia   · Family history of Mother  At Age 80   · Family history of Uterine Cancer (V16 49)  Father    · Family history of cardiac disorder (V17 49) (Z82 49)   · Family history of thyroid disease (V18 19) (Z83 49)   · Patient's father is  (V25 9) (Z80 80)    Social History    · Advance directive in chart (V49 89) (Z78 9)   · Drinks coffee   · Drinks 1 cup a day     · Former smoker (Y33 25) (I87 424)   · when he was a teenager   · Has smoke detectors   · No drug use   · Occupation   · 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   · Social alcohol use (Z78 9)   · Drinks 2 beers a week   · Denied: History of Uses Safety Equipment - Seatbelts    Current Meds  1  HydroCHLOROthiazide 25 MG Oral Tablet; TAKE 1 TABLET BY MOUTH EVERY DAY   WITH LISINOPRIL; Therapy: 78MXH3521 to ([phone])  Requested for: 39Usb7469; Last   Rx:61Zxp5492 Ordered  2  Lisinopril 20 MG Oral Tablet; Take one tablet daily; Therapy: 01KED4107 to (Evaluate:93Jtx0048)  Requested for: 12Owu7691; Last   Rx:87Xly8023 Ordered  3  Magnesium CAPS; take 1 capsule daily; Therapy: (Recorded:31Oct2017) to Recorded  4  One Daily Mens TABS; TAKE 1 TABLET DAILY; Therapy: (Recorded:31Oct2017) to Recorded  5  Turmeric CAPS; take 1 capsule daily; Therapy: (Recorded:31Oct2017) to Recorded  6  Vitamin B Complex CAPS; take 1 capsule daily; Therapy: (Recorded:31Oct2017) to Recorded    Allergies   1  Toprol XL TB24   2  No Known Environmental Allergies  3  No Known Food Allergies    Immunizations   1 2    Influenza  Temporarily Deferred: Pt requests deferral 06-Oct-2016  (71y)    Pneumo Other  15-Apr-2010  (64y)     Td/DT  15-Apr-2010  (64y)      Vitals  Signs    Heart Rate: 72  Respiration: 16  Systolic: 243, RUE, Sitting  Diastolic: 68, RUE, Sitting  Height: 5 ft 8 75 in  Weight: 162 lb 8 0 oz  BMI Calculated: 24  BSA Calculated: 1 89    Results/Data  Medicare Annual Wellness Visit 81ICR5271 01:45PM Azaellashonda Phillips     Test Name Result [facility] Reference   [id number] [id number]       EKG/ECG- [address]JN5779 01:45PM Azael Phillips     Test Name Result Flag Reference   EKG/ECG 10/31/2017                   US ABDOMINAL AORTA SCREENING AAA1 49EKF8047 07:31AM1 Azaellashonda Phillips   TW Order Number: [id number]     Test Name Result Flag Reference   US ABDOMINAL AORTA SCREENING AAA1 (Report)1     ABDOMINAL AORTIC ULTRASOUND     INDICATION: Evaluate for aortic aneurysm  COMPARISON: None  FINDINGS:      Ultrasound of the abdominal aorta was performed in longitudinal and transverse planes with a curvilinear transducer          Proximal aorta: 2 4 x 2 4 cm   Mid aorta: 2 0 x 2 1 cm   Distal aorta:  1 7 x 1 7 cm   Right common iliac origin: 1 0 x 0 9 cm   Left common iliac origin: 1 0 x 0 9 cm     No periaortic collections or adenopathy detected  IMPRESSION:     No evidence for abdominal aortic aneurysm  Workstation performed: [id number]     Signed by: Debria Hatchet, MD   11/14/17         1  Amended By: Elian Wheeler; Nov 14 2017 5:22 PM EST Health Management  Colonoscopy (Fiberoptic) Screening   (every) FECAL GLOBIN BY IMMUNOCHEM  (MEDICARE); every 1 year; Last  41NKR8158; Next Due: 52Fwv3366; Active  Hypertension   EKG/ECG- POC; every 1 year; Last 03RLA8588; Next Due: 58ZJN0593; Active  Health Maintenance   Medicare Annual Wellness Visit; every 1 year; Last 11BXV3256; Next Due: 03LXU7438; Active  PHevery-2 Adult Depression Screening; every 1 year; Last 60Wsb8116; Next Due:  91Mpu1568;  Overdue    Signatures   Electronically signed by : Vamsi Mcrae DO; Nov 14 2017  5:22PM EST                       (Author)

## 2018-01-24 NOTE — PROGRESS NOTES
Assessment   1  Creatinine elevation (790 99) (R79 89)   2  Hypertension (401 9) (I10)    Plan   Hypertension    · HydroCHLOROthiazide 25 MG Oral Tablet; TAKE 1 TABLET BY MOUTH EVERY    DAY WITH LISINOPRIL   · (1) COMPREHENSIVE METABOLIC PANEL; Status:Active; Requested SMO:34GOA7061;       Hypertension     Patient will continue lisinopril and HCTZ     He will get his blood work done to check his creatinine     We will call him with the results and decide whether to keep the lisinopril her to change it to something else     This time we cut the lisinopril in half       Chief Complaint   Pt presents here for a 3mth recheck for htn;   due 10/2018 due 02/2018 due 10/2018      History of Present Illness   Patient is here to recheck his blood pressure and also his creatinine and but he forgot to get the blood work done  His blood pressure has been very fine at home as it is here today  He ran out of HCTZ did not take any this morning and needs a refill        Review of Systems        Constitutional     No fever chills no fatigue no weight loss no weight gain          Mental status     No anxiety or depression and no sleep disturbances no changes in personality or emotional problems no suicidal or homicidal ideations          Eyes     No eye pain no red eyes no visual disturbances no discharge no eye itch          ENT     No earache no hearing loss nasal discharge no sore throat no hoarseness no postnasal drip           Cardio     No chest pain no palpitations no leg edema no claudication no dyspnea on exertion no nocturnal dyspnea          Respiratory     No shortness of breath or wheeze no cough no orthopnea no dyspnea on exertion no hemoptysis no sputum production          GI     No abdominal pain no nausea no vomiting no diarrhea or constipation no bloody stools no change in bowel habits no change in weight               no dysuria hematuria no pyuria no incontinence no pelvic pain          Musculoskeletal     No myalgias arthralgias no joint swelling or stiffness no limb pain or swelling          Skin     No rashes or lesions no itchiness and no wounds          Neuro     No headache dizziness lightheadedness syncope numbness paresthesias or confusion          Heme     No swollen glands no easy bruising                  Active Problems   1  Advance directive in chart (V49 89) (Z78 9)   2  Creatinine elevation (790 99) (R79 89)   3  Fatigue (780 79) (R53 83)   4  Hypertension (401 9) (I10)   5  Lumbar sprain (847 2) (S33 5XXA)   6  Mass of right testicle (608 89) (N50 9)   7  Nervousness (799 21) (R45 0)   8  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)   9  Screening for AAA (aortic abdominal aneurysm) (V81 2) (Z13 6)   10  Tick bite of lower back (911 4,E906 4) (T55 116G,X42  XXXA)   11  Uncomplicated alcohol abuse (305 00) (F10 10)    Past Medical History   1  History of Allergic Reaction (995 3)   2  History of Bell's Palsy Due To Lyme Disease (351 0)   3  History of Blood pressure elevated (401 9) (I10)   4  History of Calculus of salivary gland (527 5) (K11 5)   5  History of Calculus of salivary gland (527 5) (K11 5)   6  History of Decreased body height (781 91) (R29 890)   7  History of abdominal pain (V13 89) (Z87 898)   8  History of abnormal weight loss (V13 89) (Z87 898)   9  History of atrial fibrillation (V12 59) (Z86 79)   10  History of blurred vision (V12 49) (Z86 69)   11  History of lymphadenitis (V13 89) (Z87 898)   12  History of Lyme disease (088 81) (A69 20)   13  History of Mid back pain (724 5) (M54 9)   14  History of Multiple Nonvenomous Insect Bites (919 4)   15  History of Need for pneumococcal vaccination (V03 82) (Z23)   16  History of Need for vaccine for TD (tetanus-diphtheria) (V06 5) (Z23)   17  History of Open Wound Of The Foot (892 0)   18  History of Palpitations (785 1) (R00 2)   19  History of Sinus Tachycardia (427 89)   20  History of Tachycardia (785 0) (R00 0)   21   History of Testes Mass (___cm) (608 89)   22  Tick bite of groin (911 4,E906 4) (S08 336S,D23  XXXA)   23  History of Tick Bites (919 4)    Surgical History   1  Denied: History Of Prior Surgery    Family History   Mother    1  Family history of Dementia   2  Family history of Mother  At Age 80   3  Family history of Uterine Cancer (V16 49)  Father    4  Family history of cardiac disorder (V17 49) (Z82 49)   5  Family history of thyroid disease (V18 19) (Z83 49)   6  Patient's father is  (V24 11) (Z80 80)    Social History    · Advance directive in chart (V49 89) (Z78 9)   · Drinks coffee   · Former smoker (V15 82) (W42 847)   · Has smoke detectors   · No drug use   · Occupation   · Social alcohol use (Z78 9)   · Denied: History of Uses Safety Equipment - Seatbelts  The social history was reviewed and updated today  Current Meds    1  HydroCHLOROthiazide 25 MG Oral Tablet; TAKE 1 TABLET BY MOUTH EVERY DAY WITH     LISINOPRIL; Therapy: 20JZF5903 to (167 4608)  Requested for: 2017; Last     Rx:42Euj0521 Ordered   2  Lisinopril 10 MG Oral Tablet; One pill By mouth each evening  Requested for:     2017; Last Rx:2017 Ordered   3  Magnesium CAPS; take 1 capsule daily; Therapy: (Recorded:2017) to Recorded   4  One Daily Mens TABS; TAKE 1 TABLET DAILY; Therapy: (Recorded:2017) to Recorded   5  Turmeric CAPS; take 1 capsule daily; Therapy: (Recorded:2017) to Recorded   6  Vitamin B Complex CAPS; take 1 capsule daily; Therapy: (Recorded:2017) to Recorded    Allergies   1  AmLODIPine Besylate TABS   2  Toprol XL TB24  3  No Known Environmental Allergies   4   No Known Food Allergies    Vitals   Vital Signs    Recorded: 78PVN7829 03:07PM   Heart Rate 68   Respiration 16   Systolic 986, LUE, Sitting   Diastolic 72, LUE, Sitting   Height 5 ft 8 75 in   Weight 165 lb 12 8 oz   BMI Calculated 24 66   BSA Calculated 1 9     Physical Exam   Constitutional Appears healthy, Looks well, Appearance consistent with age          Mental Status     Alert, Oriented, Cooperative, Memory function normal , clean, and reasonable          Neck     No neck mass, No thyromegaly, Good carotid upstrokes bilaterally, trachea midline positive click          Respiratory     Breath sounds normal, No rales, No rhonchi, No wheezing, normal palpation          Cardiac      Regular rhythm without ectopy or murmur no S3-S4, no heave lift or thrill to palpation          Vascular     No leg edema, No pedal edema          Muscular skeletal     No clubbing cyanosis , muscle tone normal          Skin     No appreciable rashes or abnormal appearing lesions         Results/Data   Falls Risk Assessment (Dx Z13 89 Screen for Neurologic Disorder) 98OMP3652 03:09PM User, Tradorias      Test Name Result Flag Reference   Falls Risk      No falls in the past year        Health Management   History of Colonoscopy (Fiberoptic) Screening   (every) FECAL GLOBIN BY IMMUNOCHEM  (MEDICARE); every 1 year; Last 17AWV0686; Next Due:    08Eoq3882; Near Due  Hypertension   EKG/ECG- POC; every 1 year; Last 46SPR3118; Next Due: 45WZO9188; Active  Health Maintenance   Medicare Annual Wellness Visit; every 1 year; Last 65MSR3752; Next Due: 85CFZ4293; Active  PHevery-2 Adult Depression Screening; every 1 year; Last 62Iof7461; Next Due: 90Zmv1348;     Overdue    Signatures    Electronically signed by : Vamsi Mcrae DO; Jan 23 2018  3:35PM EST                       (Author)

## 2018-01-29 LAB
ALBUMIN SERPL-MCNC: 4.3 G/DL (ref 3.6–5.1)
ALBUMIN/GLOB SERPL: 1.9 (CALC) (ref 1–2.5)
ALP SERPL-CCNC: 56 U/L (ref 40–115)
ALT SERPL-CCNC: 17 U/L (ref 9–46)
AST SERPL-CCNC: 18 U/L (ref 10–35)
BILIRUB SERPL-MCNC: 0.5 MG/DL (ref 0.2–1.2)
BUN SERPL-MCNC: 23 MG/DL (ref 7–25)
BUN/CREAT SERPL: 16 (CALC) (ref 6–22)
CALCIUM SERPL-MCNC: 10.2 MG/DL (ref 8.6–10.3)
CHLORIDE SERPL-SCNC: 103 MMOL/L (ref 98–110)
CO2 SERPL-SCNC: 27 MMOL/L (ref 20–31)
CREAT SERPL-MCNC: 1.4 MG/DL (ref 0.7–1.18)
GLOBULIN SER CALC-MCNC: 2.3 G/DL (CALC) (ref 1.9–3.7)
GLUCOSE SERPL-MCNC: 81 MG/DL (ref 65–99)
POTASSIUM SERPL-SCNC: 4.2 MMOL/L (ref 3.5–5.3)
PROT SERPL-MCNC: 6.6 G/DL (ref 6.1–8.1)
SL AMB EGFR AFRICAN AMERICAN: 57 ML/MIN/1.73M2
SL AMB EGFR NON AFRICAN AMERICAN: 49 ML/MIN/1.73M2
SODIUM SERPL-SCNC: 139 MMOL/L (ref 135–146)

## 2018-01-30 ENCOUNTER — TELEPHONE (OUTPATIENT)
Dept: FAMILY MEDICINE CLINIC | Facility: CLINIC | Age: 73
End: 2018-01-30

## 2018-02-05 ENCOUNTER — TELEPHONE (OUTPATIENT)
Dept: FAMILY MEDICINE CLINIC | Facility: CLINIC | Age: 73
End: 2018-02-05

## 2018-02-05 NOTE — TELEPHONE ENCOUNTER
PATIENT SAID HE HAD A "TEST ON HIS KIDNEY" DONE LAST Monday AND DID NOT HEAR A RESULT      PT # [phone]

## 2018-02-05 NOTE — TELEPHONE ENCOUNTER
He was called on January 30th and told that he was stable by [facility]  Please let him know this  He might want to start writing things down as we call him to help him remember

## 2018-02-06 NOTE — TELEPHONE ENCOUNTER
It is higher than normal but stable for him  It is all based on stability not necessarily numbers  Tell him even though I know it is hard for him not to worry, please do not worry!

## 2018-03-07 ENCOUNTER — VBI (OUTPATIENT)
Dept: ADMINISTRATIVE | Facility: OTHER | Age: 73
End: 2018-03-07

## 2018-03-07 NOTE — TELEPHONE ENCOUNTER
Olga Morrison    ED Visit Information     Ed visit date: 3/2/18  Diagnosis Description:SPRAIN OF UNSPECIFIED LIGAMENT OF LEFT ANKLE, INITIAl  In Network? No  Discharge status: Home  Discharged with meds ? NA  Number of ED visits to date: 1  ED Severity:4     Outreach Information    Outreach successful: N/A  Date letter mailed:3/7/18  Date Finalized:3/7/18    Care Coordination    Follow up appointment with pcp: no none  Transportation issues ?  NA    Value [employer] type:  7 Day Outreach  [facility] PCP:  Yes  Practice Contacted Patient ?:  No  Pt had ED follow up with pcp/staff ?:  No

## 2018-05-22 NOTE — PROGRESS NOTES
ASSESSMENT/PLAN:    Hypertension  Patient to continue lisinopril and hydrochlorothiazide  Patient's creatinine is stable    Knee pain  Most likely arthritis  Continue glucosamine for ever    Creatinine elevation  Creatinine is stable at it has gone from 1 26, 1 38, 1 47, now 1 40  We will check this every 6 months    Nontoxic thyroid nodule  Patient had a biopsy November 2017 which was benign    Recheck in 6 months  A W[place] that day  Fasting blood work that day as well including a screen vitamin-D and PSA      Health Maintenance   Topic Date Due    SLP PLAN OF CARE  1945    COLONOSCOPY  1945    ABDOMINAL AORTIC ANEURYSM (AAA) SCREEN  10/01/2008    DTaP,Tdap,and Td Vaccines (1 - Tdap) 04/16/2010    GLAUCOMA SCREENING 67+ YR  10/01/2010    INFLUENZA VACCINE  09/01/2018    Depression Screening PHQ-9  10/31/2018    Annual Wellnes Visit (AWV)  10/31/2018    Fall Risk  01/23/2019    PNEUMOCOCCAL POLYSACCHARIDE VACCINE AGE 72 AND OVER  Completed         Problem List as of 5/25/2018 Never Reviewed    Creatinine elevation    Hypertension    Nervousness    Nontoxic single thyroid nodule    Uncomplicated alcohol abuse            Subjective:   Chief Complaint   Patient presents with    Hypertension     Patient is here for his recheck on his blood pressure because I asked him to come back to check it after decreasing his medication  He feels fine without problems  About a month ago he had pain so severe in his right knee that he could not walk  His wife suggested taking glucosamine and to keep moving and within 3 weeks to totally went away  Patient also thought perhaps he has a hernia or his wife thought he did and wants to have that checked  patient ID: Deanne Stiles is a 74 y o  male      Past Medical History:   Diagnosis Date    Abnormal weight loss     Last Assessed: 7/1/2013     Atrial fibrillation ([facility] [address] ) 10/03/2008    From Lyme Disease     Bell's palsy 10/03/2008    Due to Lyme Disease     Blurred vision     Last Assessed: 5/20/2014     Calculus of salivary gland     Last Assessed: 3/18/2014     Decreased body height 04/15/2010    Last Assessed: 2/10/2012     Hypertension 05/20/2011    Last Assessed: 2/11/2013     Lyme disease 01/27/2012    Last Assessed: 2/10/2012     Lymphadenitis 05/20/2011    Tachycardia     Last Assessed: 2/18/2013     Testis mass 05/13/2010    Tick bites     Last Assessed: 4/15/2017      Past Surgical History:   Procedure Laterality Date    NO PAST SURGERIES       Family History   Problem Relation Age of Onset    Dementia Mother     Uterine cancer Mother     Heart disease Father      Cardiac Disorder     Thyroid disease Father     Drug abuse Brother      Social History   Substance Use Topics    Smoking status: Former Smoker    Smokeless tobacco: Never Used      Comment: smoked when he was a teenager     Alcohol use Yes      Comment: Drinks 2 beers a week      History   Smoking Status    Former Smoker   Smokeless Tobacco    Never Used     Comment: smoked when he was a teenager         MED LIST WAS REVIEWED AND UPDATED       ROS    Constitutional  No fever chills no fatigue no weight loss no weight gain    Mental status  No anxiety or depression and no sleep disturbances no changes in personality or emotional problems no suicidal or homicidal ideations    Eyes  No eye pain no red eyes no visual disturbances no discharge no eye itch    ENT  No earache no hearing loss nasal discharge no sore throat no hoarseness no postnasal drip     Cardio  No chest pain no palpitations no leg edema no claudication no dyspnea on exertion no nocturnal dyspnea    Respiratory  No shortness of breath or wheeze no cough no orthopnea no dyspnea on exertion no hemoptysis no sputum production    GI  No abdominal pain no nausea no vomiting no diarrhea or constipation no bloody stools no change in bowel habits no change in weight      no dysuria hematuria no pyuria no incontinence no pelvic pain    Musculoskeletal  No myalgias arthralgias no joint swelling or stiffness no limb pain or swelling    Skin  No rashes or lesions no itchiness and no wounds    Neuro  No headache dizziness lightheadedness syncope numbness paresthesias or confusion    Heme  No swollen glands no easy bruising          Objective:      VITALS:  Wt Readings from Last 3 Encounters:   05/25/18 72 4 kg (159 lb 9 6 oz)   11/27/17 73 7 kg (162 lb 6 4 oz)   11/16/17 73 9 kg (162 lb 14 4 oz)     BP Readings from Last 3 Encounters:   05/25/18 132/60   11/27/17 118/72   11/16/17 130/72     Pulse Readings from Last 3 Encounters:   05/25/18 68   11/27/17 62   11/16/17 80     Body mass index is 23 57 kg/m²      Laboratory Results:   Lab Results   Component Value Date    WBC NONE SEEN 10/23/2017    WBC 6 1 10/23/2017    WBC 6 9 10/03/2016    WBC NONE SEEN 10/03/2016    HGB 15 3 10/23/2017    HGB 14 9 10/03/2016    HCT 45 2 10/23/2017    HCT 45 7 10/03/2016    MCV 90 8 10/23/2017    MCV 91 8 10/03/2016     10/23/2017     10/03/2016     Lab Results   Component Value Date     10/23/2017     02/14/2017     10/03/2016    K 4 3 10/23/2017    K 3 9 02/14/2017    K 4 5 10/03/2016     10/23/2017     02/14/2017     10/03/2016    CO2 30 10/23/2017    CO2 31 02/14/2017    CO2 28 10/03/2016    BUN 23 01/29/2018    BUN 22 10/23/2017    BUN 29 (H) 02/14/2017     Lab Results   Component Value Date    ALT 14 10/23/2017    AST 17 10/23/2017    ALKPHOS 56 10/23/2017    CALCIUM 10 2 01/29/2018     No results found for: TSH    Lipid Profile:   Lab Results   Component Value Date    CHOL 167 09/10/2014    CHOL 164 06/25/2013     Lab Results   Component Value Date    HDL 44 09/10/2014    HDL 48 06/25/2013     No results found for: [facility]  Lab Results   Component Value Date    TRIG 82 09/10/2014    TRIG 67 06/25/2013       Diabetic labs (if applicable)  No results found for: HGBA1C  Lab Results   Component Value Date    CREATININE 1 40 (H) 01/29/2018          Physical Exam  Constitutional  Appears healthy, Looks well, Appearance consistent with age    Mental Status  Alert, Oriented, Cooperative, Memory function normal , clean, and reasonable    Neck  No neck mass, No thyromegaly, Good carotid upstrokes bilaterally, trachea midline positive click    Respiratory  Breath sounds normal, No rales, No rhonchi, No wheezing, normal palpation    Cardiac   Regular rhythm without ectopy or murmur no S3-S4, no heave lift or thrill to palpation    Abdomen  Area of concern is just weekend belly muscles and subcutaneous fat    Vascular  No leg edema, No pedal edema    Muscular skeletal  Right knee full range of motion no crepitance no effusion  No clubbing cyanosis , muscle tone normal    Skin  No appreciable rashes or abnormal appearing lesions

## 2018-05-25 ENCOUNTER — OFFICE VISIT (OUTPATIENT)
Dept: FAMILY MEDICINE CLINIC | Facility: CLINIC | Age: 73
End: 2018-05-25
Payer: COMMERCIAL

## 2018-05-25 VITALS
SYSTOLIC BLOOD PRESSURE: 132 MMHG | HEIGHT: 69 IN | BODY MASS INDEX: 23.64 KG/M2 | DIASTOLIC BLOOD PRESSURE: 60 MMHG | RESPIRATION RATE: 16 BRPM | HEART RATE: 68 BPM | WEIGHT: 159.6 LBS

## 2018-05-25 DIAGNOSIS — R45.0 NERVOUSNESS: ICD-10-CM

## 2018-05-25 DIAGNOSIS — E04.1 NONTOXIC SINGLE THYROID NODULE: ICD-10-CM

## 2018-05-25 DIAGNOSIS — E55.9 VITAMIN D DEFICIENCY: ICD-10-CM

## 2018-05-25 DIAGNOSIS — R79.89 CREATININE ELEVATION: ICD-10-CM

## 2018-05-25 DIAGNOSIS — I10 ESSENTIAL HYPERTENSION: Primary | ICD-10-CM

## 2018-05-25 DIAGNOSIS — Z12.5 PROSTATE CANCER SCREENING: ICD-10-CM

## 2018-05-25 DIAGNOSIS — F10.10 UNCOMPLICATED ALCOHOL ABUSE: ICD-10-CM

## 2018-05-25 PROCEDURE — 99214 OFFICE O/P EST MOD 30 MIN: CPT | Performed by: FAMILY MEDICINE

## 2018-05-25 PROCEDURE — 3078F DIAST BP <80 MM HG: CPT | Performed by: FAMILY MEDICINE

## 2018-05-25 PROCEDURE — 3075F SYST BP GE 130 - 139MM HG: CPT | Performed by: FAMILY MEDICINE

## 2018-05-25 PROCEDURE — 1036F TOBACCO NON-USER: CPT | Performed by: FAMILY MEDICINE

## 2018-05-25 PROCEDURE — 3008F BODY MASS INDEX DOCD: CPT | Performed by: FAMILY MEDICINE

## 2018-05-25 PROCEDURE — 4040F PNEUMOC VAC/ADMIN/RCVD: CPT | Performed by: FAMILY MEDICINE

## 2018-05-25 PROCEDURE — 1160F RVW MEDS BY RX/DR IN RCRD: CPT | Performed by: FAMILY MEDICINE

## 2018-05-25 RX ORDER — HYDROCHLOROTHIAZIDE 25 MG/1
TABLET ORAL
Refills: 1 | COMMUNITY
Start: 2018-04-22 | End: 2019-03-01 | Stop reason: SDUPTHER

## 2018-05-25 RX ORDER — LISINOPRIL 10 MG/1
10 TABLET ORAL EVERY EVENING
Refills: 1 | COMMUNITY
Start: 2018-03-04 | End: 2018-06-07 | Stop reason: SDUPTHER

## 2018-05-25 NOTE — PATIENT INSTRUCTIONS
Hypertension  Patient to continue lisinopril and hydrochlorothiazide  Patient's creatinine is stable    Knee pain  Most likely arthritis  Continue glucosamine for ever    Creatinine elevation  Creatinine is stable at it has gone from 1 26, 1 38, 1 47, now 1 40  We will check this every 6 months    Nontoxic thyroid nodule  Patient had a biopsy November 2017 which was benign    Recheck in 6 months  A W[place] that day  Fasting blood work that day as well including a screen vitamin-D and PSA

## 2018-06-07 ENCOUNTER — TELEPHONE (OUTPATIENT)
Dept: FAMILY MEDICINE CLINIC | Facility: CLINIC | Age: 73
End: 2018-06-07

## 2018-06-07 DIAGNOSIS — I10 ESSENTIAL HYPERTENSION: Primary | ICD-10-CM

## 2018-06-07 RX ORDER — LISINOPRIL 10 MG/1
10 TABLET ORAL EVERY EVENING
Qty: 90 TABLET | Refills: 1 | Status: SHIPPED | OUTPATIENT
Start: 2018-06-07 | End: 2019-03-18 | Stop reason: SDUPTHER

## 2018-06-07 NOTE — TELEPHONE ENCOUNTER
Patient called saying he needed a refill on his Lisinopril 10 mg sent to his [facility] in [place]  I checked his meds and it looked like you sent an electronic refill for this on 5/25  I called the [facility] to double check and they never got it  I did a verbal  90 tablets with 1 refill  Was this okay?     Best number for [facility]:  [phone]

## 2018-12-18 LAB
25(OH)D3 SERPL-MCNC: 39 NG/ML (ref 30–100)
ALBUMIN SERPL-MCNC: 3.9 G/DL (ref 3.6–5.1)
ALBUMIN/GLOB SERPL: 1.6 (CALC) (ref 1–2.5)
ALP SERPL-CCNC: 55 U/L (ref 40–115)
ALT SERPL-CCNC: 14 U/L (ref 9–46)
APPEARANCE UR: CLEAR
AST SERPL-CCNC: 16 U/L (ref 10–35)
BACTERIA UR CULT: NORMAL
BACTERIA UR QL AUTO: NORMAL /HPF
BASOPHILS # BLD AUTO: 47 CELLS/UL (ref 0–200)
BASOPHILS NFR BLD AUTO: 0.7 %
BILIRUB SERPL-MCNC: 0.5 MG/DL (ref 0.2–1.2)
BILIRUB UR QL STRIP: NEGATIVE
BUN SERPL-MCNC: 25 MG/DL (ref 7–25)
BUN/CREAT SERPL: 18 (CALC) (ref 6–22)
CALCIUM SERPL-MCNC: 9.9 MG/DL (ref 8.6–10.3)
CHLORIDE SERPL-SCNC: 108 MMOL/L (ref 98–110)
CO2 SERPL-SCNC: 29 MMOL/L (ref 20–32)
COLOR UR: YELLOW
CREAT SERPL-MCNC: 1.4 MG/DL (ref 0.7–1.18)
EOSINOPHIL # BLD AUTO: 141 CELLS/UL (ref 15–500)
EOSINOPHIL NFR BLD AUTO: 2.1 %
ERYTHROCYTE [DISTWIDTH] IN BLOOD BY AUTOMATED COUNT: 12.6 % (ref 11–15)
GLOBULIN SER CALC-MCNC: 2.4 G/DL (CALC) (ref 1.9–3.7)
GLUCOSE SERPL-MCNC: 87 MG/DL (ref 65–99)
GLUCOSE UR QL STRIP: NEGATIVE
HCT VFR BLD AUTO: 43.3 % (ref 38.5–50)
HGB BLD-MCNC: 14.7 G/DL (ref 13.2–17.1)
HGB UR QL STRIP: NEGATIVE
HYALINE CASTS #/AREA URNS LPF: NORMAL /LPF
KETONES UR QL STRIP: NEGATIVE
LEUKOCYTE ESTERASE UR QL STRIP: NEGATIVE
LYMPHOCYTES # BLD AUTO: 2372 CELLS/UL (ref 850–3900)
LYMPHOCYTES NFR BLD AUTO: 35.4 %
MCH RBC QN AUTO: 30 PG (ref 27–33)
MCHC RBC AUTO-ENTMCNC: 33.9 G/DL (ref 32–36)
MCV RBC AUTO: 88.4 FL (ref 80–100)
MONOCYTES # BLD AUTO: 489 CELLS/UL (ref 200–950)
MONOCYTES NFR BLD AUTO: 7.3 %
NEUTROPHILS # BLD AUTO: 3652 CELLS/UL (ref 1500–7800)
NEUTROPHILS NFR BLD AUTO: 54.5 %
NITRITE UR QL STRIP: NEGATIVE
PH UR STRIP: 5.5 [PH] (ref 5–8)
PLATELET # BLD AUTO: 249 THOUSAND/UL (ref 140–400)
PMV BLD REES-ECKER: 10.5 FL (ref 7.5–12.5)
POTASSIUM SERPL-SCNC: 4.1 MMOL/L (ref 3.5–5.3)
PROT SERPL-MCNC: 6.3 G/DL (ref 6.1–8.1)
PROT UR QL STRIP: NEGATIVE
PSA SERPL-MCNC: 1.5 NG/ML
RBC # BLD AUTO: 4.9 MILLION/UL (ref 4.2–5.8)
RBC #/AREA URNS HPF: NORMAL /HPF
SL AMB EGFR AFRICAN AMERICAN: 57 ML/MIN/1.73M2
SL AMB EGFR NON AFRICAN AMERICAN: 49 ML/MIN/1.73M2
SODIUM SERPL-SCNC: 144 MMOL/L (ref 135–146)
SP GR UR STRIP: 1.02 (ref 1–1.03)
SQUAMOUS #/AREA URNS HPF: NORMAL /HPF
TSH SERPL-ACNC: 3 MIU/L (ref 0.4–4.5)
WBC # BLD AUTO: 6.7 THOUSAND/UL (ref 3.8–10.8)
WBC #/AREA URNS HPF: NORMAL /HPF

## 2019-01-04 ENCOUNTER — TELEPHONE (OUTPATIENT)
Dept: FAMILY MEDICINE CLINIC | Facility: CLINIC | Age: 74
End: 2019-01-04

## 2019-01-04 NOTE — TELEPHONE ENCOUNTER
Patient knows that Dr Mariposa Nicolas is out on leave  He had blood work done on the  and is waiting for a phone call from you with the results (he said you said you would call him)      Best number for [facility]:  [phone]

## 2019-01-07 NOTE — TELEPHONE ENCOUNTER
Everything looked good and within range except his creatinine although elevated still has not changed since last check which is good

## 2019-02-17 NOTE — PROGRESS NOTES
ASSESSMENT/PLAN:    Hypertension  Excellent with lisinopril and hydrochlorothiazide  I reassured patient that I did not receive anything about problems with hydrochlorothiazide and in fact it is 1 of the very 1st anti hypertensives available and has been around for decades  Elevated creatinine  Stable at 1 4  We will continue to check it every year     Nontoxic thyroid nodule  Patient had a biopsy November 2017 which was benign  We will check an ultrasound for stability     Recheck in 6 months   blood pressure check only  EKG that day         Health Maintenance   Topic Date Due    SLP PLAN OF CARE  1945    DTaP,Tdap,and Td Vaccines (1 - Tdap) 04/16/2010    Pneumococcal PPSV23/PCV13 65+ Years / Low and Medium Risk (2 of 2 - PCV13) 04/15/2011    INFLUENZA VACCINE  07/01/2018    Medicare Annual Wellness Visit (AWV)  10/31/2018    Fall Risk  02/21/2020    Depression Screening PHQ  02/21/2020    BMI: Adult  02/21/2020    HEPATITIS B VACCINES  Aged Out         Problem List as of 2/21/2019 Reviewed: 5/25/2018  9:33 AM by Wanda Vivas, DO    Creatinine elevation    Hypertension    Nervousness    Nontoxic single thyroid nodule    Uncomplicated alcohol abuse            Subjective:   Chief Complaint   Patient presents with   [address] [facility] Hypertension    Medicare Wellness Visit     Patient is here for 6 month recheck and go over his blood work  He continues to be very healthy and takes care of himself  He has no concerns  He had all his blood work done and is here to go over      patient ID: Lorenza Martin is a 74 y o  male      Past Medical History:   Diagnosis Date    Abnormal weight loss     Last Assessed: 7/1/2013     Atrial fibrillation ([address] ) 10/03/2008    From Lyme Disease     Bell's palsy 10/03/2008    Due to Lyme Disease     Blurred vision     Last Assessed: 5/20/2014     Calculus of salivary gland     Last Assessed: 3/18/2014     Decreased body height 04/15/2010    Last Assessed: 2/10/2012     Hypertension 05/20/2011    Last Assessed: 2/11/2013     Lyme disease 01/27/2012    Last Assessed: 2/10/2012     Lymphadenitis 05/20/2011    Tachycardia     Last Assessed: 2/18/2013     Testis mass 05/13/2010    Tick bites     Last Assessed: 4/15/2017      Past Surgical History:   Procedure Laterality Date    NO PAST SURGERIES       Family History   Problem Relation Age of Onset    Dementia Mother     Uterine cancer Mother     Heart disease Father         Cardiac Disorder     Thyroid disease Father     Drug abuse Brother     Substance Abuse Brother     Alcohol abuse Neg Hx      Social History     Tobacco Use    Smoking status: Former Smoker    Smokeless tobacco: Never Used    Tobacco comment: smoked when he was a teenager    Substance Use Topics    Alcohol use: Yes     Comment: Drinks 2 beers a week     Drug use: No     Social History     Tobacco Use   Smoking Status Former Smoker   Smokeless Tobacco Never Used   Tobacco Comment    smoked when he was a teenager         MED LIST WAS REVIEWED AND UPDATED       ROS    Constitutional  No fever chills no fatigue no weight loss no weight gain    Mental status  No anxiety or depression and no sleep disturbances no changes in personality or emotional problems no suicidal or homicidal ideations    Eyes  No eye pain no red eyes no visual disturbances no discharge no eye itch    ENT  No earache no hearing loss nasal discharge no sore throat no hoarseness no postnasal drip     Cardio  No chest pain no palpitations no leg edema no claudication no dyspnea on exertion no nocturnal dyspnea    Respiratory  No shortness of breath or wheeze no cough no orthopnea no dyspnea on exertion no hemoptysis no sputum production    GI  No abdominal pain no nausea no vomiting no diarrhea or constipation no bloody stools no change in bowel habits no change in weight      no dysuria hematuria no pyuria no incontinence no pelvic pain    Musculoskeletal  No myalgias arthralgias no joint swelling or stiffness no limb pain or swelling    Skin  No rashes or lesions no itchiness and no wounds    Neuro  No headache dizziness lightheadedness syncope numbness paresthesias or confusion    Heme  No swollen glands no easy bruising          Objective:      VITALS:  Wt Readings from Last 3 Encounters:   02/21/19 72 5 kg (159 lb 12 8 oz)   05/25/18 72 4 kg (159 lb 9 6 oz)   11/27/17 73 7 kg (162 lb 6 4 oz)     BP Readings from Last 3 Encounters:   02/21/19 110/70   05/25/18 132/60   11/27/17 118/72     Pulse Readings from Last 3 Encounters:   02/21/19 76   05/25/18 68   11/27/17 62     Body mass index is 23 6 kg/m²      Laboratory Results:   Lab Results   Component Value Date    WBC 6 7 12/17/2018    WBC NONE SEEN 10/23/2017    WBC 6 1 10/23/2017    HGB 14 7 12/17/2018    HGB 15 3 10/23/2017    HGB 14 9 10/03/2016    HCT 43 3 12/17/2018    HCT 45 2 10/23/2017    HCT 45 7 10/03/2016    MCV 88 4 12/17/2018    MCV 90 8 10/23/2017    MCV 91 8 10/03/2016     12/17/2018     10/23/2017     10/03/2016     Lab Results   Component Value Date     10/23/2017     02/14/2017     10/03/2016    K 4 1 12/17/2018    K 4 2 01/29/2018    K 4 3 10/23/2017     12/17/2018     01/29/2018     10/23/2017    CO2 29 12/17/2018    CO2 27 01/29/2018    CO2 30 10/23/2017    BUN 25 12/17/2018    BUN 23 01/29/2018    BUN 22 10/23/2017     Lab Results   Component Value Date    ALT 14 12/17/2018    AST 16 12/17/2018    ALKPHOS 55 12/17/2018    CALCIUM 9 9 12/17/2018     No results found for: TSH    Lipid Profile:   Lab Results   Component Value Date    CHOL 167 09/10/2014    CHOL 164 06/25/2013     Lab Results   Component Value Date    HDL 44 09/10/2014    HDL 48 06/25/2013     No results found for: [facility]  Lab Results   Component Value Date    TRIG 82 09/10/2014    TRIG 67 06/25/2013       Diabetic labs (if applicable)  No results found for: HGBA1C  Lab Results   Component Value Date    CREATININE 1 47 (H) 10/23/2017          Physical Exam      Gen  No acute distress well-appearing well-nourished appears stated age    Mental status  Good judgment and insight oriented to time person and place, recent and remote memory intact mood and affect normal cooperative and patient is reasonable    HEENT  PERRLA 3 mm, EOMI without nystagmus, TMs clear, turbinates open pink no exudate, pharynx benign, tongue midline    Neck   supple no masses trachea midline positive click normal carotid upstrokes with no bruits    Cor  Regular rhythm without ectopy or murmur, no S3-S4, normal palpation that is no heave lift or thrill    Vascular  No edema, good pedal pulses    Lungs  CTA bilaterally in no respiratory distress no wheezes rhonchi or rales, normal to palpation no tactile fremitus    Abdomen  Soft, no palpable masses, no hepatosplenomegaly, normal bowel sounds, nontender    Lymphatics  No palpable nodes in the neck, supraclavicular area, axilla, or groin     Musculoskeletal  No clubbing cyanosis or edema muscle tone normal    Skin  no rashes or abnormal appearing lesions    Neuro  Normal ambulation, cranial nerves 2-12 grossly intact, higher functioning with reasoning intact

## 2019-02-21 ENCOUNTER — OFFICE VISIT (OUTPATIENT)
Dept: FAMILY MEDICINE CLINIC | Facility: CLINIC | Age: 74
End: 2019-02-21
Payer: COMMERCIAL

## 2019-02-21 VITALS
DIASTOLIC BLOOD PRESSURE: 70 MMHG | RESPIRATION RATE: 16 BRPM | HEART RATE: 76 BPM | HEIGHT: 69 IN | SYSTOLIC BLOOD PRESSURE: 110 MMHG | BODY MASS INDEX: 23.67 KG/M2 | WEIGHT: 159.8 LBS

## 2019-02-21 DIAGNOSIS — R79.89 CREATININE ELEVATION: ICD-10-CM

## 2019-02-21 DIAGNOSIS — I10 ESSENTIAL HYPERTENSION: ICD-10-CM

## 2019-02-21 DIAGNOSIS — Z00.00 MEDICARE ANNUAL WELLNESS VISIT, SUBSEQUENT: Primary | ICD-10-CM

## 2019-02-21 DIAGNOSIS — E04.1 NONTOXIC SINGLE THYROID NODULE: ICD-10-CM

## 2019-02-21 PROCEDURE — 1101F PT FALLS ASSESS-DOCD LE1/YR: CPT | Performed by: FAMILY MEDICINE

## 2019-02-21 PROCEDURE — 3725F SCREEN DEPRESSION PERFORMED: CPT | Performed by: FAMILY MEDICINE

## 2019-02-21 PROCEDURE — 99214 OFFICE O/P EST MOD 30 MIN: CPT | Performed by: FAMILY MEDICINE

## 2019-02-21 PROCEDURE — 1170F FXNL STATUS ASSESSED: CPT | Performed by: FAMILY MEDICINE

## 2019-02-21 PROCEDURE — 1160F RVW MEDS BY RX/DR IN RCRD: CPT | Performed by: FAMILY MEDICINE

## 2019-02-21 PROCEDURE — 1036F TOBACCO NON-USER: CPT | Performed by: FAMILY MEDICINE

## 2019-02-21 PROCEDURE — 3078F DIAST BP <80 MM HG: CPT | Performed by: FAMILY MEDICINE

## 2019-02-21 PROCEDURE — 1125F AMNT PAIN NOTED PAIN PRSNT: CPT | Performed by: FAMILY MEDICINE

## 2019-02-21 PROCEDURE — 3074F SYST BP LT 130 MM HG: CPT | Performed by: FAMILY MEDICINE

## 2019-02-21 PROCEDURE — G0439 PPPS, SUBSEQ VISIT: HCPCS | Performed by: FAMILY MEDICINE

## 2019-02-21 NOTE — PATIENT INSTRUCTIONS
Hypertension  Excellent with lisinopril and hydrochlorothiazide  I reassured patient that I did not receive anything about problems with hydrochlorothiazide and in fact it is 1 of the very 1st anti hypertensives available and has been around for decades      Elevated creatinine  Stable at 1 4  We will continue to check it every year     Nontoxic thyroid nodule  Patient had a biopsy November 2017 which was benign  We will check an ultrasound for stability     Recheck in 6 months   blood pressure check only  EKG that day

## 2019-02-21 NOTE — PROGRESS NOTES
Assessment and Plan:    Problem List Items Addressed This Visit     None        Health Maintenance Due   Topic Date Due    SLP PLAN OF CARE  1945    DTaP,Tdap,and Td Vaccines (1 - Tdap) 04/16/2010    Pneumococcal PPSV23/PCV13 65+ Years / Low and Medium Risk (2 of 2 - PCV13) 04/15/2011    INFLUENZA VACCINE  07/01/2018    Medicare Annual Wellness Visit (AWV)  10/31/2018         HPI:  Nini Beard is a 74 y o  male here for his Subsequent Wellness Visit      Patient Active Problem List   Diagnosis    Creatinine elevation    Hypertension    Nervousness    Nontoxic single thyroid nodule    Uncomplicated alcohol abuse     Past Medical History:   Diagnosis Date    Abnormal weight loss     Last Assessed: 7/1/2013     Atrial fibrillation ([address] ) 10/03/2008    From Lyme Disease     Bell's palsy 10/03/2008    Due to Lyme Disease     Blurred vision     Last Assessed: 5/20/2014     Calculus of salivary gland     Last Assessed: 3/18/2014     Decreased body height 04/15/2010    Last Assessed: 2/10/2012     Hypertension 05/20/2011    Last Assessed: 2/11/2013     Lyme disease 01/27/2012    Last Assessed: 2/10/2012     Lymphadenitis 05/20/2011    Tachycardia     Last Assessed: 2/18/2013     Testis mass 05/13/2010    Tick bites     Last Assessed: 4/15/2017      Past Surgical History:   Procedure Laterality Date    NO PAST SURGERIES       Family History   Problem Relation Age of Onset    Dementia Mother     Uterine cancer Mother     Heart disease Father         Cardiac Disorder     Thyroid disease Father     Drug abuse Brother     Substance Abuse Brother     Alcohol abuse Neg Hx      Social History     Tobacco Use   Smoking Status Former Smoker   Smokeless Tobacco Never Used   Tobacco Comment    smoked when he was a teenager      Social History     Substance and Sexual Activity   Alcohol Use Yes    Comment: Drinks 2 beers a week       Social History     Substance and Sexual Activity   Drug Use No Current Outpatient Medications   Medication Sig Dispense Refill    Flaxseed, Linseed, (FLAXSEED OIL PO) Take by mouth      Glucosamine-Chondroitin (GLUCOSAMINE CHONDR COMPLEX PO) Take by mouth      hydrochlorothiazide (HYDRODIURIL) 25 mg tablet TAKE 1 TABLET BY MOUTH EVERY DAY WITH LISINOPRIL  1    lisinopril (ZESTRIL) 10 mg tablet Take 1 tablet (10 mg total) by mouth every evening 90 tablet 1    Magnesium Oxide -Mg Supplement 400 MG CAPS Take 1 capsule by mouth daily      Multiple Vitamins-Minerals (ONE DAILY MENS) TABS Take 1 tablet by mouth daily      ZINC CHELATED PO Take by mouth       No current facility-administered medications for this visit  Allergies   Allergen Reactions    Amlodipine Headache and Palpitations    Metoprolol Palpitations     Immunization History   Administered Date(s) Administered    INFLUENZA 10/06/2016, 10/31/2017    Influenza Split High Dose Preservative Free IM 10/06/2016, 10/31/2017    Pneumococcal Polysaccharide PPV23 04/15/2010    Td (adult), adsorbed 04/15/2010       Patient Care Team:  Wanda Vivas DO as PCP - General    Medicare Screening Tests and Risk Assessments:  Chato Proctor is here for his Subsequent Wellness visit  Health Risk Assessment:  Patient rates overall health as very good  Patient feels that their physical health rating is Same  Eyesight was rated as Same  Hearing was rated as Same  Patient feels that their emotional and mental health rating is Same  Pain experienced by patient in the last 7 days has been None  Patient states that he has experienced no weight loss or gain in last 6 months  Emotional/Mental Health:  Patient has been feeling nervous/anxious  PHQ-9 Depression Screening:    Frequency of the following problems over the past two weeks:      1  Little interest or pleasure in doing things: 0 - not at all      2  Feeling down, depressed, or hopeless: 0 - not at all  PHQ-2 Score: 0          Broken Bones/Falls:     Fall Risk Assessment:    In the past year, patient has experienced: No history of falling in past year          Bladder/Bowel:  Patient has not leaked urine accidently in the last six months  Patient reports no loss of bowel control  Immunizations:  Patient has had a flu vaccination within the last year  Patient has received a pneumonia shot  Patient has not received a shingles shot  Patient has received tetanus/diphtheria shot  (Additional Comments: Will consider Shingrix discussed pros and cons)    Home Safety:  Patient does not have trouble with stairs inside or outside of their home  Patient currently reports that there are no safety hazards present in home, working smoke alarms, no working carbon monoxide detectors  Preventative Screenings:   prostate cancer screen performed, colon cancer screen completed, cholesterol screen completed, glaucoma eye exam completed,     Nutrition:  Current diet: Regular and Limited junk food with servings of the following:    Medications:  Patient is currently taking over-the-counter supplements  List of OTC medications includes: Vitamins and minerals  Patient is able to manage medications  Lifestyle Choices:  Patient reports no tobacco use  Patient has smoked or used tobacco in the past   Patient has stopped his tobacco use  Tobacco use quit date: Quit smoking in 1980  Patient reports alcohol use  Alcohol use per week: 3  Patient drives a vehicle  Patient does not wear seat belt  Current level of exercise of physical activity described by patient as: No         Activities of Daily Living:  Can get out of bed by his or her self, able to dress self, able to make own meals, able to do own shopping, able to bathe self, can do own laundry/housekeeping, can manage own money, pay bills and track expenses    Previous Hospitalizations:  No hospitalization or ED visit in past 12 months        Advanced Directives:  Patient has not decided on power of   Patient has not completed advanced directive  Preventative Screening/Counseling:      Cardiovascular:      General: Screening Current          Diabetes:      General: Screening Current          Colorectal Cancer:      General: Screening Current          Prostate Cancer:      General: Screening Current          Osteoporosis:      General: Screening Not Indicated          AAA:      General: Screening Not Indicated          HIV:      General: Screening Not Indicated          Hepatitis C:      General: Screening Not Indicated        Advanced Directives:   Patient has living will for healthcare,     Immunizations:      Influenza: Risks & Benefits Discussed      Pneumococcal: Patient Declines      Shingrix: Risks & Benefits Discussed      Hepatitis B (Low risk patients): Series Not Indicated      TD: Td Vaccine UTD      TDAP: Tdap Vaccine UTD      Other Preventative Counseling (Non-Medicare):   Fall Prevention and Increase physical activity

## 2019-03-01 DIAGNOSIS — I10 ESSENTIAL HYPERTENSION: Primary | ICD-10-CM

## 2019-03-01 RX ORDER — HYDROCHLOROTHIAZIDE 25 MG/1
TABLET ORAL
Qty: 90 TABLET | Refills: 1 | Status: SHIPPED | OUTPATIENT
Start: 2019-03-01 | End: 2019-09-21 | Stop reason: SDUPTHER

## 2019-03-18 DIAGNOSIS — I10 ESSENTIAL HYPERTENSION: ICD-10-CM

## 2019-03-18 RX ORDER — LISINOPRIL 10 MG/1
TABLET ORAL
Qty: 90 TABLET | Refills: 1 | Status: SHIPPED | OUTPATIENT
Start: 2019-03-18 | End: 2019-09-21 | Stop reason: SDUPTHER

## 2019-08-28 NOTE — PROGRESS NOTES
ASSESSMENT/PLAN:    Hypertension  Blood pressure today elevated 130/90 most likely secondary to stress of trying to move  We will continue lisinopril and hydrochlorothiazide for now  We will continue to monitor    Creatinine  Usually stable at 1 4  We will check that in 6 months     Nontoxic thyroid nodule  Patient had a biopsy November 2017 which was benign  We will check an ultrasound for stability     Recheck in 6 months  Screening blood work 1 week prior  If patient ends up selling the house sooner he will get his blood work done beforehand  We may need need to take the PSA off his labs because that was done 12/17/2018 so will need to be done that day or the day after  Then he will schedule a come see me       Health Maintenance   Topic Date Due    SLP PLAN OF CARE  1945    DTaP,Tdap,and Td Vaccines (1 - Tdap) 04/16/2010    Pneumococcal Vaccine: 65+ Years (2 of 2 - PCV13) 04/15/2011    INFLUENZA VACCINE  07/01/2019    Fall Risk  02/21/2020    Depression Screening PHQ  02/21/2020    Medicare Annual Wellness Visit (AWV)  02/21/2020    BMI: Adult  08/29/2020    Pneumococcal Vaccine: Pediatrics (0 to 5 Years) and At-Risk Patients (6 to 59 Years)  Aged Out    HEPATITIS B VACCINES  Aged Out         Problem List as of 8/29/2019 Reviewed: 2/21/2019  8:41 AM by Rosio Shepherd, DO    Creatinine elevation    Essential hypertension    Nervousness    Nontoxic single thyroid nodule    Uncomplicated alcohol abuse            Subjective:   Chief Complaint   Patient presents with    Anxiety    Hyperlipidemia     Patient is here to recheck on his blood pressure  He is stressed over trying to sell his house primarily because of all the things he needs to fix after living there for 35 years  He is really looking forward to moving down [facility] to [place]  He did not get his ultrasound of his thyroid done yet but will do so in the near future, he is afraid he needed another needle biopsy        patient ID: Gudenisse Inman is a 74 y o  male      Past Medical History:   Diagnosis Date    Abnormal weight loss     Last Assessed: 7/1/2013     Atrial fibrillation ([address] ) 10/03/2008    From Lyme Disease     Bell's palsy 10/03/2008    Due to Lyme Disease     Blurred vision     Last Assessed: 5/20/2014     Calculus of salivary gland     Last Assessed: 3/18/2014     Decreased body height 04/15/2010    Last Assessed: 2/10/2012     Hypertension 05/20/2011    Last Assessed: 2/11/2013     Lyme disease 01/27/2012    Last Assessed: 2/10/2012     Lymphadenitis 05/20/2011    Tachycardia     Last Assessed: 2/18/2013     Testis mass 05/13/2010    Tick bites     Last Assessed: 4/15/2017      Past Surgical History:   Procedure Laterality Date    NO PAST SURGERIES       Family History   Problem Relation Age of Onset    Dementia Mother     Uterine cancer Mother     Heart disease Father         Cardiac Disorder     Thyroid disease Father     Drug abuse Brother     Substance Abuse Brother     Alcohol abuse Neg Hx      Social History     Tobacco Use    Smoking status: Former Smoker    Smokeless tobacco: Never Used    Tobacco comment: smoked when he was a teenager    Substance Use Topics    Alcohol use: Yes     Comment: Drinks 2 beers a week     Drug use: No     Social History     Tobacco Use   Smoking Status Former Smoker   Smokeless Tobacco Never Used   Tobacco Comment    smoked when he was a teenager         MED LIST WAS REVIEWED AND UPDATED       ROS    Constitutional  No fever chills no fatigue no weight loss no weight gain    Mental status  No anxiety or depression and no sleep disturbances no changes in personality or emotional problems no suicidal or homicidal ideations    Eyes  No eye pain no red eyes no visual disturbances no discharge no eye itch    ENT  No earache no hearing loss nasal discharge no sore throat no hoarseness no postnasal drip     Cardio  No chest pain no palpitations no leg edema no claudication no dyspnea on exertion no nocturnal dyspnea    Respiratory  No shortness of breath or wheeze no cough no orthopnea no dyspnea on exertion no hemoptysis no sputum production    GI  No abdominal pain no nausea no vomiting no diarrhea or constipation no bloody stools no change in bowel habits no change in weight      no dysuria hematuria no pyuria no incontinence no pelvic pain    Musculoskeletal  No myalgias arthralgias no joint swelling or stiffness no limb pain or swelling    Skin  No rashes or lesions no itchiness and no wounds    Neuro  No headache dizziness lightheadedness syncope numbness paresthesias or confusion    Heme  No swollen glands no easy bruising          Objective:      VITALS:  Wt Readings from Last 3 Encounters:   08/29/19 71 8 kg (158 lb 3 2 oz)   02/21/19 72 5 kg (159 lb 12 8 oz)   05/25/18 72 4 kg (159 lb 9 6 oz)     BP Readings from Last 3 Encounters:   08/29/19 130/90   02/21/19 110/70   05/25/18 132/60     Pulse Readings from Last 3 Encounters:   08/29/19 80   02/21/19 76   05/25/18 68     Body mass index is 23 36 kg/m²      Laboratory Results:   Lab Results   Component Value Date    WBC 6 7 12/17/2018    WBC NONE SEEN 10/23/2017    WBC 6 1 10/23/2017    HGB 14 7 12/17/2018    HGB 15 3 10/23/2017    HGB 14 9 10/03/2016    HCT 43 3 12/17/2018    HCT 45 2 10/23/2017    HCT 45 7 10/03/2016    MCV 88 4 12/17/2018    MCV 90 8 10/23/2017    MCV 91 8 10/03/2016     12/17/2018     10/23/2017     10/03/2016     Lab Results   Component Value Date     10/23/2017     02/14/2017     10/03/2016    K 4 1 12/17/2018    K 4 2 01/29/2018    K 4 3 10/23/2017     12/17/2018     01/29/2018     10/23/2017    CO2 29 12/17/2018    CO2 27 01/29/2018    CO2 30 10/23/2017    BUN 25 12/17/2018    BUN 23 01/29/2018    BUN 22 10/23/2017     Lab Results   Component Value Date    ALT 14 12/17/2018    AST 16 12/17/2018    ALKPHOS 55 12/17/2018    CALCIUM 9 9 12/17/2018     Lab Results   Component Value Date    TSHRFT4 3 00 12/17/2018         Lipid Profile:   Lab Results   Component Value Date    CHOL 167 09/10/2014    CHOL 164 06/25/2013     Lab Results   Component Value Date    HDL 44 09/10/2014    HDL 48 06/25/2013     No results found for: [facility]  Lab Results   Component Value Date    TRIG 82 09/10/2014    TRIG 67 06/25/2013       Diabetic labs (if applicable)  No results found for: HGBA1C  Lab Results   Component Value Date    CREATININE 1 40 (H) 12/17/2018          Physical Exam  Constitutional  Appears healthy, Looks well, Appearance consistent with age    Mental Status  Alert, Oriented, Cooperative, Memory function normal , clean, and reasonable    Neck  No neck mass, No thyromegaly, Good carotid upstrokes bilaterally, trachea midline positive click    Respiratory  Breath sounds normal, No rales, No rhonchi, No wheezing, normal palpation    Cardiac   Regular rhythm without ectopy or murmur no S3-S4, no heave lift or thrill to palpation    Vascular  No leg edema, No pedal edema    Muscular skeletal  No clubbing cyanosis , muscle tone normal    Skin  No appreciable rashes or abnormal appearing lesions

## 2019-08-29 ENCOUNTER — OFFICE VISIT (OUTPATIENT)
Dept: FAMILY MEDICINE CLINIC | Facility: CLINIC | Age: 74
End: 2019-08-29
Payer: COMMERCIAL

## 2019-08-29 VITALS
BODY MASS INDEX: 23.43 KG/M2 | WEIGHT: 158.2 LBS | HEIGHT: 69 IN | DIASTOLIC BLOOD PRESSURE: 90 MMHG | RESPIRATION RATE: 16 BRPM | SYSTOLIC BLOOD PRESSURE: 130 MMHG | HEART RATE: 80 BPM

## 2019-08-29 DIAGNOSIS — E55.9 VITAMIN D DEFICIENCY: ICD-10-CM

## 2019-08-29 DIAGNOSIS — E04.1 NONTOXIC SINGLE THYROID NODULE: ICD-10-CM

## 2019-08-29 DIAGNOSIS — Z12.5 PROSTATE CANCER SCREENING: ICD-10-CM

## 2019-08-29 DIAGNOSIS — F10.10 UNCOMPLICATED ALCOHOL ABUSE: ICD-10-CM

## 2019-08-29 DIAGNOSIS — I10 ESSENTIAL HYPERTENSION: Primary | ICD-10-CM

## 2019-08-29 DIAGNOSIS — R79.89 CREATININE ELEVATION: ICD-10-CM

## 2019-08-29 DIAGNOSIS — R45.0 NERVOUSNESS: ICD-10-CM

## 2019-08-29 PROCEDURE — 93000 ELECTROCARDIOGRAM COMPLETE: CPT | Performed by: FAMILY MEDICINE

## 2019-08-29 PROCEDURE — 99214 OFFICE O/P EST MOD 30 MIN: CPT | Performed by: FAMILY MEDICINE

## 2019-08-29 NOTE — PATIENT INSTRUCTIONS
Hypertension  Blood pressure today elevated 130/90 most likely secondary to stress of trying to move  We will continue lisinopril and hydrochlorothiazide for now  We will continue to monitor    Creatinine  Usually stable at 1 4  We will check that in 6 months     Nontoxic thyroid nodule  Patient had a biopsy November 2017 which was benign  We will check an ultrasound for stability     Recheck in 6 months  Screening blood work 1 week prior  If patient ends up selling the house sooner he will get his blood work done beforehand  We may need need to take the PSA off his labs because that was done 12/17/2018 so will need to be done that day or the day after    Then he will schedule a come see me

## 2019-09-21 DIAGNOSIS — I10 ESSENTIAL HYPERTENSION: ICD-10-CM

## 2019-09-23 RX ORDER — LISINOPRIL 10 MG/1
TABLET ORAL
Qty: 90 TABLET | Refills: 1 | Status: SHIPPED | OUTPATIENT
Start: 2019-09-23 | End: 2020-04-03

## 2019-09-23 RX ORDER — HYDROCHLOROTHIAZIDE 25 MG/1
TABLET ORAL
Qty: 90 TABLET | Refills: 1 | Status: SHIPPED | OUTPATIENT
Start: 2019-09-23 | End: 2020-04-03

## 2020-02-05 ENCOUNTER — TELEPHONE (OUTPATIENT)
Dept: FAMILY MEDICINE CLINIC | Facility: CLINIC | Age: 75
End: 2020-02-05

## 2020-02-05 NOTE — TELEPHONE ENCOUNTER
Patient will need new lab orders because he uses [facility] but they will not take orders over 10 mths old   The orders in the chart are too old

## 2020-02-06 DIAGNOSIS — I10 ESSENTIAL HYPERTENSION: Primary | ICD-10-CM

## 2020-02-06 DIAGNOSIS — Z12.5 PROSTATE CANCER SCREENING: ICD-10-CM

## 2020-02-19 LAB
ALBUMIN SERPL-MCNC: 4.2 G/DL (ref 3.6–5.1)
ALBUMIN/GLOB SERPL: 1.9 (CALC) (ref 1–2.5)
ALP SERPL-CCNC: 54 U/L (ref 35–144)
ALT SERPL-CCNC: 17 U/L (ref 9–46)
APPEARANCE UR: CLEAR
AST SERPL-CCNC: 17 U/L (ref 10–35)
BASOPHILS # BLD AUTO: 30 CELLS/UL (ref 0–200)
BASOPHILS NFR BLD AUTO: 0.4 %
BILIRUB SERPL-MCNC: 0.6 MG/DL (ref 0.2–1.2)
BILIRUB UR QL STRIP: NEGATIVE
BUN SERPL-MCNC: 25 MG/DL (ref 7–25)
BUN/CREAT SERPL: 17 (CALC) (ref 6–22)
CALCIUM SERPL-MCNC: 9.9 MG/DL (ref 8.6–10.3)
CHLORIDE SERPL-SCNC: 107 MMOL/L (ref 98–110)
CO2 SERPL-SCNC: 28 MMOL/L (ref 20–32)
COLOR UR: YELLOW
CREAT SERPL-MCNC: 1.43 MG/DL (ref 0.7–1.18)
EOSINOPHIL # BLD AUTO: 143 CELLS/UL (ref 15–500)
EOSINOPHIL NFR BLD AUTO: 1.9 %
ERYTHROCYTE [DISTWIDTH] IN BLOOD BY AUTOMATED COUNT: 13 % (ref 11–15)
GLOBULIN SER CALC-MCNC: 2.2 G/DL (CALC) (ref 1.9–3.7)
GLUCOSE SERPL-MCNC: 86 MG/DL (ref 65–99)
GLUCOSE UR QL STRIP: NEGATIVE
HCT VFR BLD AUTO: 45 % (ref 38.5–50)
HGB BLD-MCNC: 15 G/DL (ref 13.2–17.1)
HGB UR QL STRIP: NEGATIVE
KETONES UR QL STRIP: NEGATIVE
LEUKOCYTE ESTERASE UR QL STRIP: NEGATIVE
LYMPHOCYTES # BLD AUTO: 2123 CELLS/UL (ref 850–3900)
LYMPHOCYTES NFR BLD AUTO: 28.3 %
MCH RBC QN AUTO: 30.5 PG (ref 27–33)
MCHC RBC AUTO-ENTMCNC: 33.3 G/DL (ref 32–36)
MCV RBC AUTO: 91.6 FL (ref 80–100)
MONOCYTES # BLD AUTO: 585 CELLS/UL (ref 200–950)
MONOCYTES NFR BLD AUTO: 7.8 %
NEUTROPHILS # BLD AUTO: 4620 CELLS/UL (ref 1500–7800)
NEUTROPHILS NFR BLD AUTO: 61.6 %
NITRITE UR QL STRIP: NEGATIVE
PH UR STRIP: 5.5 [PH] (ref 5–8)
PLATELET # BLD AUTO: 233 THOUSAND/UL (ref 140–400)
PMV BLD REES-ECKER: 10.6 FL (ref 7.5–12.5)
POTASSIUM SERPL-SCNC: 3.9 MMOL/L (ref 3.5–5.3)
PROT SERPL-MCNC: 6.4 G/DL (ref 6.1–8.1)
PROT UR QL STRIP: NEGATIVE
PSA SERPL-MCNC: 1.7 NG/ML
RBC # BLD AUTO: 4.91 MILLION/UL (ref 4.2–5.8)
SL AMB EGFR AFRICAN AMERICAN: 55 ML/MIN/1.73M2
SL AMB EGFR NON AFRICAN AMERICAN: 47 ML/MIN/1.73M2
SODIUM SERPL-SCNC: 141 MMOL/L (ref 135–146)
SP GR UR STRIP: 1.02 (ref 1–1.03)
TSH SERPL-ACNC: 3.49 MIU/L (ref 0.4–4.5)
WBC # BLD AUTO: 7.5 THOUSAND/UL (ref 3.8–10.8)

## 2020-02-24 NOTE — PROGRESS NOTES
ASSESSMENT/PLAN:    Hypertension  Blood pressure much better today  Continue lisinopril and hydrochlorothiazide  Recheck six-month     Creatinine  Still stable at 1 4  Recheck in 6 months     Nontoxic thyroid nodule  Patient had a biopsy November 2017 which was benign  patient will try to get the ultrasound again     Recheck in 6 months  Chem 7 prior     Falls Plan of Care: balance, strength, and gait training instructions were provided  Trudi Garrido on [employer]           Health Maintenance   Topic Date Due    SLP PLAN OF CARE  1945    DTaP,Tdap,and Td Vaccines (1 - Tdap) 10/01/1956    Pneumococcal Vaccine: 65+ Years (2 of 2 - PCV13) 04/15/2011    Medicare Annual Wellness Visit (AWV)  02/21/2020    Fall Risk  02/25/2021    Depression Screening PHQ  02/25/2021    BMI: Adult  02/25/2021    Falls: Plan of Care  02/25/2021    Influenza Vaccine  Completed    Pneumococcal Vaccine: Pediatrics (0 to 5 Years) and At-Risk Patients (6 to 59 Years)  Aged Out    HIB Vaccine  Aged Out    Hepatitis B Vaccine  Aged Out    IPV Vaccine  Aged Out    Hepatitis A Vaccine  Aged Out    Meningococcal ACWY Vaccine  Aged Out    HPV Vaccine  Aged Out         Problem List as of 2/25/2020 Reviewed: 8/29/2019  8:07 AM by Val Morley, DO    Creatinine elevation    Essential hypertension    Nervousness    Nontoxic single thyroid nodule    Uncomplicated alcohol abuse            Subjective:   Chief Complaint   Patient presents with    Hypertension    Medicare Wellness Visit     Patient is here for 6 month recheck  He did not get a chance yet to do his ultrasound of his thyroid  He was trying to sell his house but that fell through    Otherwise he feels fine and just wants to go over his blood work  patient ID: Jaimie Martinez is a 66 y o  male      Past Medical History:   Diagnosis Date    Abnormal weight loss     Last Assessed: 7/1/2013     Atrial [address] [address] ) 10/03/2008    From Lyme Disease     Bell's palsy 10/03/2008 Due to Lyme Disease     Blurred vision     Last Assessed: 5/20/2014     Calculus of salivary gland     Last Assessed: 3/18/2014     Decreased body height 04/15/2010    Last Assessed: 2/10/2012     Hypertension 05/20/2011    Last Assessed: 2/11/2013     Lyme disease 01/27/2012    Last Assessed: 2/10/2012     Lymphadenitis 05/20/2011    Tachycardia     Last Assessed: 2/18/2013     Testis mass 05/13/2010    Tick bites     Last Assessed: 4/15/2017      Past Surgical History:   Procedure Laterality Date    NO PAST SURGERIES       Family History   Problem Relation Age of Onset    Dementia Mother     Uterine cancer Mother     Heart disease Father         Cardiac Disorder     Thyroid disease Father     Drug abuse Brother     Substance Abuse Brother     Alcohol abuse Neg Hx      Social History     Tobacco Use    Smoking status: Former Smoker    Smokeless tobacco: Never Used    Tobacco comment: smoked when he was a teenager    Substance Use Topics    Alcohol use: Yes     Comment: Drinks 2 beers a week     Drug use: No     Social History     Tobacco Use   Smoking Status Former Smoker   Smokeless Tobacco Never Used   Tobacco Comment    smoked when he was a teenager         MED LIST WAS REVIEWED AND UPDATED       ROS    Constitutional  No fever chills no fatigue no weight loss no weight gain    Mental status  No anxiety or depression and no sleep disturbances no changes in personality or emotional problems no suicidal or homicidal ideations    Eyes  No eye pain no red eyes no visual disturbances no discharge no eye itch    ENT  No earache no hearing loss nasal discharge no sore throat no hoarseness no postnasal drip     Cardio  No chest pain no palpitations no leg edema no claudication no dyspnea on exertion no nocturnal dyspnea    Respiratory  No shortness of breath or wheeze no cough no orthopnea no dyspnea on exertion no hemoptysis no sputum production    GI  No abdominal pain no nausea no vomiting no diarrhea or constipation no bloody stools no change in bowel habits no change in weight      no dysuria hematuria no pyuria no incontinence no pelvic pain    Musculoskeletal  No myalgias arthralgias no joint swelling or stiffness no limb pain or swelling    Skin  No rashes or lesions no itchiness and no wounds    Neuro  No headache dizziness lightheadedness syncope numbness paresthesias or confusion    Heme  No swollen glands no easy bruising            Objective:      VITALS:  Wt Readings from Last 3 Encounters:   02/25/20 75 2 kg (165 lb 11 2 oz)   08/29/19 71 8 kg (158 lb 3 2 oz)   02/21/19 72 5 kg (159 lb 12 8 oz)     BP Readings from Last 3 Encounters:   02/25/20 128/78   08/29/19 130/90   02/21/19 110/70     Pulse Readings from Last 3 Encounters:   02/25/20 80   08/29/19 80   02/21/19 76     Body mass index is 24 47 kg/m²  Laboratory Results:    All pertinent labs and studies were reviewed with patient  during this office visit  including the following:    Lab Results   Component Value Date    WBC 7 5 02/18/2020    WBC 6 7 12/17/2018    WBC NONE SEEN 10/23/2017    WBC 6 1 10/23/2017    HGB 15 0 02/18/2020    HGB 14 7 12/17/2018    HGB 15 3 10/23/2017    HCT 45 0 02/18/2020    HCT 43 3 12/17/2018    HCT 45 2 10/23/2017    MCV 91 6 02/18/2020    MCV 88 4 12/17/2018    MCV 90 8 10/23/2017     02/18/2020     12/17/2018     10/23/2017     Lab Results   Component Value Date     10/23/2017     02/14/2017     10/03/2016    K 3 9 02/18/2020    K 4 1 12/17/2018    K 4 2 01/29/2018     02/18/2020     12/17/2018     01/29/2018    CO2 28 02/18/2020    CO2 29 12/17/2018    CO2 27 01/29/2018    BUN 25 02/18/2020    BUN 25 12/17/2018    BUN 23 01/29/2018     Lab Results   Component Value Date    ALT 17 02/18/2020    AST 17 02/18/2020    ALKPHOS 54 02/18/2020    CALCIUM 9 9 02/18/2020     Lab Results   Component Value Date    TSHRFT4 3 49 02/18/2020    TSHRFT4 3 00 12/17/2018           Lipid Profile:   Lab Results   Component Value Date    CHOL 167 09/10/2014    CHOL 164 06/25/2013     Lab Results   Component Value Date    HDL 44 09/10/2014    HDL 48 06/25/2013     No results found for: [address]  Lab Results   Component Value Date    TRIG 82 09/10/2014    TRIG 67 06/25/2013       Diabetic labs (if applicable)  No results found for: HGBA1C  Lab Results   Component Value Date    CREATININE 1 43 (H) 02/18/2020          Physical Exam    Gen  No acute distress well-appearing well-nourished appears stated age    Mental status  Good judgment and insight oriented to time person and place, recent and remote memory intact mood and affect normal cooperative and patient is reasonable    HEENT  PERRLA 3 mm, EOMI without nystagmus, TMs clear, turbinates open pink no exudate, pharynx benign, tongue midline    Neck   supple no masses trachea midline positive click normal carotid upstrokes with no bruits    Cor  Regular rhythm without ectopy or murmur, no S3-S4, normal palpation that is no heave lift or thrill    Vascular  No edema, good pedal pulses    Lungs  CTA bilaterally in no respiratory distress no wheezes rhonchi or rales, normal to palpation no tactile fremitus    Abdomen  Soft, no palpable masses, no hepatosplenomegaly, normal bowel sounds, nontender    Lymphatics  No palpable nodes in the neck, supraclavicular area, axilla, or groin     Musculoskeletal  No clubbing cyanosis or edema muscle tone normal    Skin  no rashes or abnormal appearing lesions    Neuro  Normal ambulation, cranial nerves 2-12 grossly intact, higher functioning with reasoning intact

## 2020-02-25 ENCOUNTER — OFFICE VISIT (OUTPATIENT)
Dept: FAMILY MEDICINE CLINIC | Facility: CLINIC | Age: 75
End: 2020-02-25
Payer: COMMERCIAL

## 2020-02-25 VITALS
SYSTOLIC BLOOD PRESSURE: 128 MMHG | HEART RATE: 80 BPM | HEIGHT: 69 IN | WEIGHT: 165.7 LBS | DIASTOLIC BLOOD PRESSURE: 78 MMHG | BODY MASS INDEX: 24.54 KG/M2 | RESPIRATION RATE: 16 BRPM

## 2020-02-25 DIAGNOSIS — R79.89 CREATININE ELEVATION: ICD-10-CM

## 2020-02-25 DIAGNOSIS — Z00.00 MEDICARE ANNUAL WELLNESS VISIT, SUBSEQUENT: Primary | ICD-10-CM

## 2020-02-25 DIAGNOSIS — I10 ESSENTIAL HYPERTENSION: ICD-10-CM

## 2020-02-25 DIAGNOSIS — F10.10 UNCOMPLICATED ALCOHOL ABUSE: ICD-10-CM

## 2020-02-25 DIAGNOSIS — E04.1 NONTOXIC SINGLE THYROID NODULE: ICD-10-CM

## 2020-02-25 DIAGNOSIS — R45.0 NERVOUSNESS: ICD-10-CM

## 2020-02-25 PROCEDURE — 3288F FALL RISK ASSESSMENT DOCD: CPT | Performed by: FAMILY MEDICINE

## 2020-02-25 PROCEDURE — 1125F AMNT PAIN NOTED PAIN PRSNT: CPT | Performed by: FAMILY MEDICINE

## 2020-02-25 PROCEDURE — G0439 PPPS, SUBSEQ VISIT: HCPCS | Performed by: FAMILY MEDICINE

## 2020-02-25 PROCEDURE — 3008F BODY MASS INDEX DOCD: CPT | Performed by: FAMILY MEDICINE

## 2020-02-25 PROCEDURE — 1170F FXNL STATUS ASSESSED: CPT | Performed by: FAMILY MEDICINE

## 2020-02-25 PROCEDURE — 4040F PNEUMOC VAC/ADMIN/RCVD: CPT | Performed by: FAMILY MEDICINE

## 2020-02-25 PROCEDURE — 3074F SYST BP LT 130 MM HG: CPT | Performed by: FAMILY MEDICINE

## 2020-02-25 PROCEDURE — 99214 OFFICE O/P EST MOD 30 MIN: CPT | Performed by: FAMILY MEDICINE

## 2020-02-25 PROCEDURE — 1100F PTFALLS ASSESS-DOCD GE2>/YR: CPT | Performed by: FAMILY MEDICINE

## 2020-02-25 PROCEDURE — 3078F DIAST BP <80 MM HG: CPT | Performed by: FAMILY MEDICINE

## 2020-02-25 PROCEDURE — 1160F RVW MEDS BY RX/DR IN RCRD: CPT | Performed by: FAMILY MEDICINE

## 2020-02-25 PROCEDURE — 1036F TOBACCO NON-USER: CPT | Performed by: FAMILY MEDICINE

## 2020-02-25 NOTE — PATIENT INSTRUCTIONS
1  Please consider getting ultrasound of the thyroid done at least once more    2  Please consider getting the Shingrix vaccine    See you back in 6 months with nonfasting blood work 1 week prior            [employer] Preventive Visit Patient Instructions  Thank you for completing your Welcome to Medicare Visit or Medicare Annual Wellness Visit today  Your next wellness visit will be due in one year (2/25/2021)  The screening/preventive services that you may require over the next 5-10 years are detailed below  Some tests may not apply to you based off risk factors and/or age  Screening tests ordered at today's visit but not completed yet may show as past due  Also, please note that scanned in results may not display below  Preventive Screenings:  Service Recommendations Previous Testing/Comments   Colorectal Cancer Screening  · Colonoscopy    · Fecal Occult Blood Test (FOBT)/Fecal Immunochemical Test (FIT)  · Fecal DNA/Cologuard Test  · Flexible Sigmoidoscopy Age: 54-65 years old   Colonoscopy: every 10 years (May be performed more frequently if at higher risk)  OR  FOBT/FIT: every 1 year  OR  Cologuard: every 3 years  OR  Sigmoidoscopy: every 5 years  Screening may be recommended earlier than age 48 if at higher risk for colorectal cancer  Also, an individualized decision between you and your healthcare provider will decide whether screening between the ages of 74-80 would be appropriate   Colonoscopy: Not on file  FOBT/FIT: Not on file  Cologuard: Not on file  Sigmoidoscopy: Not on file         Prostate Cancer Screening Individualized decision between patient and health care provider in men between ages of 53-78   Medicare will cover every 12 months beginning on the day after your 50th birthday PSA: 1 7 ng/mL     Screening Not Indicated     Hepatitis C Screening Once for adults born between [facility]  More frequently in patients at high risk for Hepatitis C Hep C Antibody: Not on file       Diabetes Screening 1-2 times per year if you're at risk for diabetes or have pre-diabetes Fasting glucose: No results in last 5 years   A1C: No results in last 5 years    Screening Current   Cholesterol Screening Once every 5 years if you don't have a lipid disorder  May order more often based on risk factors  Lipid panel: Not on file          Other Preventive Screenings Covered by Medicare:  1  Abdominal Aortic Aneurysm (AAA) Screening: covered once if your at risk  You're considered to be at risk if you have a family history of AAA or a male between the age of 73-68 who smoking at least 100 cigarettes in your lifetime  2  Lung Cancer Screening: covers low dose CT scan once per year if you meet all of the following conditions: (1) Age 50-69; (2) No signs or symptoms of lung cancer; (3) Current smoker or have quit smoking within the last 15 years; (4) You have a tobacco smoking history of at least 30 pack years (packs per day x number of years you smoked); (5) You get a written order from a healthcare provider  3  Glaucoma Screening: covered annually if you're considered high risk: (1) You have diabetes OR (2) Family history of glaucoma OR (3)  aged 48 and older OR (3)  American aged 72 and older  3  Osteoporosis Screening: covered every 2 years if you meet one of the following conditions: (1) Have a vertebral abnormality; (2) On glucocorticoid therapy for more than 3 months; (3) Have primary hyperparathyroidism; (4) On osteoporosis medications and need to assess response to drug therapy  5  HIV Screening: covered annually if you're between the age of 12-76  Also covered annually if you are younger than 13 and older than 72 with risk factors for HIV infection  For pregnant patients, it is covered up to 3 times per pregnancy      Immunizations:  Immunization Recommendations   Influenza Vaccine Annual influenza vaccination during flu season is recommended for all persons aged >= 6 months who do not have contraindications   Pneumococcal Vaccine (Prevnar and Pneumovax)  * Prevnar = PCV13  * Pneumovax = PPSV23 Adults 25-60 years old: 1-3 doses may be recommended based on certain risk factors  Adults 72 years old: Prevnar (PCV13) vaccine recommended followed by Pneumovax (PPSV23) vaccine  If already received PPSV23 since turning 65, then PCV13 recommended at least one year after PPSV23 dose  Hepatitis B Vaccine 3 dose series if at intermediate or high risk (ex: diabetes, end stage renal disease, liver disease)   Tetanus (Td) Vaccine - COST NOT COVERED BY MEDICARE PART B Following completion of primary series, a booster dose should be given every 10 years to maintain immunity against tetanus  Td may also be given as tetanus wound prophylaxis  Tdap Vaccine - COST NOT COVERED BY MEDICARE PART B Recommended at least once for all adults  For pregnant patients, recommended with each pregnancy  Shingles Vaccine (Shingrix) - COST NOT COVERED BY MEDICARE PART B  2 shot series recommended in those aged 48 and above     Health Maintenance Due:  There are no preventive care reminders to display for this patient  Immunizations Due:      Topic Date Due    DTaP,Tdap,and Td Vaccines (1 - Tdap) 10/01/1954    Pneumococcal Vaccine: 65+ Years (2 of 2 - PCV13) 04/15/2011     Advance Directives   What are advance directives? Advance directives are legal documents that state your wishes and plans for medical care  These plans are made ahead of time in case you lose your ability to make decisions for yourself  Advance directives can apply to any medical decision, such as the treatments you want, and if you want to donate organs  What are the types of advance directives? There are many types of advance directives, and each state has rules about how to use them  You may choose a combination of any of the following:  · Living will: This is a written record of the treatment you want   You can also choose which treatments you do not want, which to limit, and which to stop at a certain time  This includes surgery, medicine, IV fluid, and tube feedings  · Durable power of  for healthcare [facility]): This is a written record that states who you want to make healthcare choices for you when you are unable to make them for yourself  This person, called a proxy, is usually a family member or a friend  You may choose more than 1 proxy  · Do not resuscitate (DNR) order:  A DNR order is used in case your heart stops beating or you stop breathing  It is a request not to have certain forms of treatment, such as CPR  A DNR order may be included in other types of advance directives  · Medical directive: This covers the care that you want if you are in a coma, near death, or unable to make decisions for yourself  You can list the treatments you want for each condition  Treatment may include pain medicine, surgery, blood transfusions, dialysis, IV or tube feedings, and a ventilator (breathing machine)  · Values history: This document has questions about your views, beliefs, and how you feel and think about life  This information can help others choose the care that you would choose  Why are advance directives important? An advance directive helps you control your care  Although spoken wishes may be used, it is better to have your wishes written down  Spoken wishes can be misunderstood, or not followed  Treatments may be given even if you do not want them  An advance directive may make it easier for your family to make difficult choices about your care  Fall Prevention    Fall prevention  includes ways to make your home and other areas safer  It also includes ways you can move more carefully to prevent a fall  Health conditions that cause changes in your blood pressure, vision, or muscle strength and coordination may increase your risk for falls  Medicines may also increase your risk for falls if they make you dizzy, weak, or sleepy     Fall prevention tips:   · Stand or sit up slowly  · Use assistive devices as directed  · Wear shoes that fit well and have soles that   · Wear a personal alarm  · Stay active  · Manage your medical conditions  Home Safety Tips:  · Add items to prevent falls in the bathroom  · Keep paths clear  · Install bright lights in your home  · Keep items you use often on shelves within reach  · Paint or place reflective tape on the edges of your stairs  © Copyright  Information is for End User's use only and may not be sold, redistributed or otherwise used for commercial purposes   All illustrations and images included in CareNotes® are the copyrighted property of A D A M , Inc  or [address] [place] [address] [employer]

## 2020-02-25 NOTE — PROGRESS NOTES
Assessment and Plan:   1  Patient does not want any vaccines including Shingrix or his Prevnar 13  2  Patient never did a colonoscopy but did do a few insure fit  He does not want test anymore  Problem List Items Addressed This Visit        Endocrine    Nontoxic single thyroid nodule       Cardiovascular and Mediastinum    Essential hypertension       Other    Creatinine elevation - Primary    Nervousness    Uncomplicated alcohol abuse           Preventive health issues were discussed with patient, and age appropriate screening tests were ordered as noted in patient's After Visit Summary  Personalized health advice and appropriate referrals for health education or preventive services given if needed, as noted in patient's After Visit Summary       History of Present Illness:     Patient presents for Medicare Annual Wellness visit    Patient Care Team:  Feliciano Page DO as PCP - General     Problem List:     Patient Active Problem List   Diagnosis    Creatinine elevation    Essential hypertension    Nervousness    Nontoxic single thyroid nodule    Uncomplicated alcohol abuse      Past Medical and Surgical History:     Past Medical History:   Diagnosis Date    Abnormal weight loss     Last Assessed: 7/1/2013     Atrial fibrillation ([facility] [address] ) 10/03/2008    From Lyme Disease     Bell's palsy 10/03/2008    Due to Lyme Disease     Blurred vision     Last Assessed: 5/20/2014     Calculus of salivary gland     Last Assessed: 3/18/2014     Decreased body height 04/15/2010    Last Assessed: 2/10/2012     Hypertension 05/20/2011    Last Assessed: 2/11/2013     Lyme disease 01/27/2012    Last Assessed: 2/10/2012     Lymphadenitis 05/20/2011    Tachycardia     Last Assessed: 2/18/2013     Testis mass 05/13/2010    Tick bites     Last Assessed: 4/15/2017      Past Surgical History:   Procedure Laterality Date    NO PAST SURGERIES        Family History:     Family History   Problem Relation Age of Onset    Dementia Mother     Uterine cancer Mother     Heart disease Father         Cardiac Disorder     Thyroid disease Father     Drug abuse Brother     Substance Abuse Brother     Alcohol abuse Neg Hx       Social History:        Social History     Socioeconomic History    Marital status: /Civil Union     Spouse name: None    Number of children: None    Years of education: None    Highest education level: None   Occupational History    Occupation: Works with [employer]      Comment: Exposed to hazardous Substances    Social Needs    Financial resource strain: None    Food insecurity:     Worry: None     Inability: None    Transportation needs:     Medical: None     Non-medical: None   Tobacco Use    Smoking status: Former Smoker    Smokeless tobacco: Never Used    Tobacco comment: smoked when he was a teenager    Substance and Sexual Activity    Alcohol use: Yes     Comment: Drinks 2 beers a week     Drug use: No    Sexual activity: None   Lifestyle    Physical activity:     Days per week: None     Minutes per session: None    Stress: None   Relationships    Social connections:     Talks on phone: None     Gets together: None     Attends Hindu service: None     Active member of club or organization: None     Attends meetings of clubs or organizations: None     Relationship status: None    Intimate partner violence:     Fear of current or ex partner: None     Emotionally abused: None     Physically abused: None     Forced sexual activity: None   Other Topics Concern    None   Social History Narrative    Drinks Coffee- 1 cup a day     Has smoke detectors     Denied: History of Uses Safety Equipment- Seatbelts       Medications and Allergies:     Current Outpatient Medications   Medication Sig Dispense Refill    hydrochlorothiazide (HYDRODIURIL) 25 mg tablet TAKE 1 TABLET BY MOUTH EVERY DAY WITH LISINOPRIL 90 tablet 1    lisinopril (ZESTRIL) 10 mg tablet TAKE 1 TABLET BY MOUTH EVERY DAY IN THE EVENING 90 tablet 1    Multiple Vitamins-Minerals (ONE DAILY MENS) TABS Take 1 tablet by mouth daily      Probiotic Product (PROBIOTIC DAILY PO) Take by mouth      ZINC CHELATED PO Take by mouth       No current facility-administered medications for this visit  Allergies   Allergen Reactions    Amlodipine Headache and Palpitations    Metoprolol Palpitations      Immunizations:     Immunization History   Administered Date(s) Administered    INFLUENZA 10/06/2016, 10/31/2017    Influenza Split High Dose Preservative Free IM 10/06/2016, 10/31/2017, 11/01/2019    Pneumococcal Polysaccharide PPV23 04/15/2010    Td (adult), adsorbed 04/15/2010      Health Maintenance: There are no preventive care reminders to display for this patient  Topic Date Due    DTaP,Tdap,and Td Vaccines (1 - Tdap) 10/01/1956    Pneumococcal Vaccine: 65+ Years (2 of 2 - PCV13) 04/15/2011      Medicare Health Risk Assessment:     /60 (BP Location: Left arm, Patient Position: Sitting, Cuff Size: Standard)   Pulse 80   Resp 16   Ht 5' 9" (1 753 m)   Wt 75 2 kg (165 lb 11 2 oz)   BMI 24 47 kg/m²      Giovani Cooper is here for his Subsequent Wellness visit  Health Risk Assessment:   Patient rates overall health as very good  Patient feels that their physical health rating is same  Eyesight was rated as slightly worse  Hearing was rated as same  Patient feels that their emotional and mental health rating is same  Pain experienced in the last 7 days has been none  Patient states that he has experienced no weight loss or gain in last 6 months  Depression Screening:   PHQ-2 Score: 0      Fall Risk Screening: In the past year, patient has experienced: no history of falling in past year      Home Safety:  Patient does not have trouble with stairs inside or outside of their home  Patient has working smoke alarms and has working carbon monoxide detector  Home safety hazards include: none       Nutrition:   Current diet is Regular and Limited junk food  Medications:   Patient is currently taking over-the-counter supplements  OTC medications include: see medication list  Patient is able to manage medications  Activities of Daily Living (ADLs)/Instrumental Activities of Daily Living (IADLs):   Walk and transfer into and out of bed and chair?: Yes  Dress and groom yourself?: Yes    Bathe or shower yourself?: Yes    Feed yourself? Yes  Do your laundry/housekeeping?: Yes  Manage your money, pay your bills and track your expenses?: Yes  Make your own meals?: Yes    Do your own shopping?: Yes    Previous Hospitalizations:   Any hospitalizations or ED visits within the last 12 months?: No      Advance Care Planning:   Living will: Yes    Advanced directive: Yes    Advanced directive counseling given: Yes    End of Life Decisions reviewed with patient: Yes      Cognitive Screening:   Provider or family/friend/caregiver concerned regarding cognition?: No    PREVENTIVE SCREENINGS      Cardiovascular Screening:    General: Screening Not Indicated      Diabetes Screening:     General: Screening Current      Colorectal Cancer Screening:     General: Screening Not Indicated      Prostate Cancer Screening:    General: Screening Not Indicated      Osteoporosis Screening:    General: Screening Not Indicated      Abdominal Aortic Aneurysm (AAA) Screening:    Risk factors include: tobacco use        General: Screening Current      Lung Cancer Screening:     General: Screening Not Indicated      Hepatitis C Screening:    General: Screening Not Indicated    Other Counseling Topics:   Calcium and vitamin D intake         John Alexander DO

## 2020-04-03 DIAGNOSIS — I10 ESSENTIAL HYPERTENSION: ICD-10-CM

## 2020-04-03 RX ORDER — HYDROCHLOROTHIAZIDE 25 MG/1
TABLET ORAL
Qty: 90 TABLET | Refills: 1 | Status: SHIPPED | OUTPATIENT
Start: 2020-04-03 | End: 2020-04-20 | Stop reason: SDUPTHER

## 2020-04-03 RX ORDER — LISINOPRIL 10 MG/1
TABLET ORAL
Qty: 90 TABLET | Refills: 1 | Status: SHIPPED | OUTPATIENT
Start: 2020-04-03 | End: 2020-10-19

## 2020-04-20 ENCOUNTER — TELEPHONE (OUTPATIENT)
Dept: FAMILY MEDICINE CLINIC | Facility: CLINIC | Age: 75
End: 2020-04-20

## 2020-04-20 DIAGNOSIS — I10 ESSENTIAL HYPERTENSION: ICD-10-CM

## 2020-04-20 RX ORDER — HYDROCHLOROTHIAZIDE 25 MG/1
25 TABLET ORAL
Qty: 90 TABLET | Refills: 1 | Status: SHIPPED | OUTPATIENT
Start: 2020-04-20 | End: 2021-02-03

## 2020-10-18 DIAGNOSIS — I10 ESSENTIAL HYPERTENSION: ICD-10-CM

## 2020-10-19 RX ORDER — LISINOPRIL 10 MG/1
TABLET ORAL
Qty: 90 TABLET | Refills: 1 | Status: SHIPPED | OUTPATIENT
Start: 2020-10-19 | End: 2021-04-30

## 2020-10-28 ENCOUNTER — TELEPHONE (OUTPATIENT)
Dept: FAMILY MEDICINE CLINIC | Facility: CLINIC | Age: 75
End: 2020-10-28

## 2020-12-02 ENCOUNTER — TELEPHONE (OUTPATIENT)
Dept: FAMILY MEDICINE CLINIC | Facility: CLINIC | Age: 75
End: 2020-12-02

## 2020-12-02 DIAGNOSIS — R79.89 CREATININE ELEVATION: Primary | ICD-10-CM

## 2020-12-14 LAB
BUN SERPL-MCNC: 26 MG/DL (ref 7–25)
BUN/CREAT SERPL: 18 (CALC) (ref 6–22)
CALCIUM SERPL-MCNC: 10.1 MG/DL (ref 8.6–10.3)
CHLORIDE SERPL-SCNC: 106 MMOL/L (ref 98–110)
CO2 SERPL-SCNC: 30 MMOL/L (ref 20–32)
CREAT SERPL-MCNC: 1.45 MG/DL (ref 0.7–1.18)
GLUCOSE SERPL-MCNC: 86 MG/DL (ref 65–99)
POTASSIUM SERPL-SCNC: 4 MMOL/L (ref 3.5–5.3)
SL AMB EGFR AFRICAN AMERICAN: 53 ML/MIN/1.73M2
SL AMB EGFR NON AFRICAN AMERICAN: 46 ML/MIN/1.73M2
SODIUM SERPL-SCNC: 141 MMOL/L (ref 135–146)

## 2020-12-22 ENCOUNTER — OFFICE VISIT (OUTPATIENT)
Dept: FAMILY MEDICINE CLINIC | Facility: CLINIC | Age: 75
End: 2020-12-22
Payer: COMMERCIAL

## 2020-12-22 VITALS
RESPIRATION RATE: 15 BRPM | HEIGHT: 69 IN | DIASTOLIC BLOOD PRESSURE: 82 MMHG | TEMPERATURE: 98.2 F | SYSTOLIC BLOOD PRESSURE: 124 MMHG | WEIGHT: 163.4 LBS | BODY MASS INDEX: 24.2 KG/M2 | HEART RATE: 68 BPM

## 2020-12-22 DIAGNOSIS — E04.1 NONTOXIC SINGLE THYROID NODULE: ICD-10-CM

## 2020-12-22 DIAGNOSIS — F10.10 UNCOMPLICATED ALCOHOL ABUSE: ICD-10-CM

## 2020-12-22 DIAGNOSIS — N18.31 STAGE 3A CHRONIC KIDNEY DISEASE (HCC): ICD-10-CM

## 2020-12-22 DIAGNOSIS — I10 ESSENTIAL HYPERTENSION: ICD-10-CM

## 2020-12-22 DIAGNOSIS — Z12.5 PROSTATE CANCER SCREENING: ICD-10-CM

## 2020-12-22 DIAGNOSIS — E55.9 VITAMIN D DEFICIENCY: ICD-10-CM

## 2020-12-22 DIAGNOSIS — R45.0 NERVOUSNESS: ICD-10-CM

## 2020-12-22 DIAGNOSIS — R39.198 URINARY STREAM SLOWING: Primary | ICD-10-CM

## 2020-12-22 DIAGNOSIS — R79.89 CREATININE ELEVATION: ICD-10-CM

## 2020-12-22 PROCEDURE — 1160F RVW MEDS BY RX/DR IN RCRD: CPT | Performed by: FAMILY MEDICINE

## 2020-12-22 PROCEDURE — 99215 OFFICE O/P EST HI 40 MIN: CPT | Performed by: FAMILY MEDICINE

## 2020-12-22 PROCEDURE — T1015 CLINIC SERVICE: HCPCS | Performed by: FAMILY MEDICINE

## 2020-12-22 PROCEDURE — 1036F TOBACCO NON-USER: CPT | Performed by: FAMILY MEDICINE

## 2020-12-22 PROCEDURE — 3079F DIAST BP 80-89 MM HG: CPT | Performed by: FAMILY MEDICINE

## 2020-12-22 PROCEDURE — 3074F SYST BP LT 130 MM HG: CPT | Performed by: FAMILY MEDICINE

## 2020-12-22 RX ORDER — TAMSULOSIN HYDROCHLORIDE 0.4 MG/1
0.4 CAPSULE ORAL
Qty: 90 CAPSULE | Refills: 1 | Status: SHIPPED | OUTPATIENT
Start: 2020-12-22 | End: 2021-12-13 | Stop reason: ALTCHOICE

## 2021-01-23 DIAGNOSIS — Z23 ENCOUNTER FOR IMMUNIZATION: ICD-10-CM

## 2021-01-25 ENCOUNTER — IMMUNIZATIONS (OUTPATIENT)
Dept: FAMILY MEDICINE CLINIC | Facility: HOSPITAL | Age: 76
End: 2021-01-25

## 2021-01-25 DIAGNOSIS — Z23 ENCOUNTER FOR IMMUNIZATION: Primary | ICD-10-CM

## 2021-01-25 PROCEDURE — 0011A SARS-COV-2 / COVID-19 MRNA VACCINE (MODERNA) 100 MCG: CPT

## 2021-01-25 PROCEDURE — 91301 SARS-COV-2 / COVID-19 MRNA VACCINE (MODERNA) 100 MCG: CPT

## 2021-02-03 DIAGNOSIS — I10 ESSENTIAL HYPERTENSION: ICD-10-CM

## 2021-02-03 RX ORDER — HYDROCHLOROTHIAZIDE 25 MG/1
TABLET ORAL
Qty: 90 TABLET | Refills: 1 | Status: SHIPPED | OUTPATIENT
Start: 2021-02-03 | End: 2021-08-09

## 2021-02-20 ENCOUNTER — IMMUNIZATIONS (OUTPATIENT)
Dept: FAMILY MEDICINE CLINIC | Facility: HOSPITAL | Age: 76
End: 2021-02-20

## 2021-02-20 DIAGNOSIS — Z23 ENCOUNTER FOR IMMUNIZATION: Primary | ICD-10-CM

## 2021-02-20 PROCEDURE — 0012A SARS-COV-2 / COVID-19 MRNA VACCINE (MODERNA) 100 MCG: CPT

## 2021-02-20 PROCEDURE — 91301 SARS-COV-2 / COVID-19 MRNA VACCINE (MODERNA) 100 MCG: CPT

## 2021-04-14 LAB
25(OH)D3 SERPL-MCNC: 40 NG/ML (ref 30–100)
ALBUMIN SERPL-MCNC: 4.1 G/DL (ref 3.6–5.1)
ALBUMIN/GLOB SERPL: 1.7 (CALC) (ref 1–2.5)
ALP SERPL-CCNC: 55 U/L (ref 35–144)
ALT SERPL-CCNC: 11 U/L (ref 9–46)
APPEARANCE UR: CLEAR
AST SERPL-CCNC: 15 U/L (ref 10–35)
BASOPHILS # BLD AUTO: 62 CELLS/UL (ref 0–200)
BASOPHILS NFR BLD AUTO: 0.9 %
BILIRUB SERPL-MCNC: 0.7 MG/DL (ref 0.2–1.2)
BILIRUB UR QL STRIP: NEGATIVE
BUN SERPL-MCNC: 25 MG/DL (ref 7–25)
BUN/CREAT SERPL: 16 (CALC) (ref 6–22)
CALCIUM SERPL-MCNC: 10.3 MG/DL (ref 8.6–10.3)
CHLORIDE SERPL-SCNC: 105 MMOL/L (ref 98–110)
CO2 SERPL-SCNC: 29 MMOL/L (ref 20–32)
COLOR UR: YELLOW
CREAT SERPL-MCNC: 1.56 MG/DL (ref 0.7–1.18)
EOSINOPHIL # BLD AUTO: 159 CELLS/UL (ref 15–500)
EOSINOPHIL NFR BLD AUTO: 2.3 %
ERYTHROCYTE [DISTWIDTH] IN BLOOD BY AUTOMATED COUNT: 12.6 % (ref 11–15)
GLOBULIN SER CALC-MCNC: 2.4 G/DL (CALC) (ref 1.9–3.7)
GLUCOSE SERPL-MCNC: 87 MG/DL (ref 65–99)
GLUCOSE UR QL STRIP: NEGATIVE
HCT VFR BLD AUTO: 46 % (ref 38.5–50)
HGB BLD-MCNC: 15.4 G/DL (ref 13.2–17.1)
HGB UR QL STRIP: NEGATIVE
KETONES UR QL STRIP: NEGATIVE
LEUKOCYTE ESTERASE UR QL STRIP: NEGATIVE
LYMPHOCYTES # BLD AUTO: 2098 CELLS/UL (ref 850–3900)
LYMPHOCYTES NFR BLD AUTO: 30.4 %
MCH RBC QN AUTO: 30.7 PG (ref 27–33)
MCHC RBC AUTO-ENTMCNC: 33.5 G/DL (ref 32–36)
MCV RBC AUTO: 91.8 FL (ref 80–100)
MONOCYTES # BLD AUTO: 642 CELLS/UL (ref 200–950)
MONOCYTES NFR BLD AUTO: 9.3 %
NEUTROPHILS # BLD AUTO: 3940 CELLS/UL (ref 1500–7800)
NEUTROPHILS NFR BLD AUTO: 57.1 %
NITRITE UR QL STRIP: NEGATIVE
PH UR STRIP: 6 [PH] (ref 5–8)
PLATELET # BLD AUTO: 271 THOUSAND/UL (ref 140–400)
PMV BLD REES-ECKER: 10.8 FL (ref 7.5–12.5)
POTASSIUM SERPL-SCNC: 4.1 MMOL/L (ref 3.5–5.3)
PROT SERPL-MCNC: 6.5 G/DL (ref 6.1–8.1)
PROT UR QL STRIP: NEGATIVE
PSA SERPL-MCNC: 1.7 NG/ML
RBC # BLD AUTO: 5.01 MILLION/UL (ref 4.2–5.8)
SL AMB EGFR AFRICAN AMERICAN: 49 ML/MIN/1.73M2
SL AMB EGFR NON AFRICAN AMERICAN: 42 ML/MIN/1.73M2
SODIUM SERPL-SCNC: 141 MMOL/L (ref 135–146)
SP GR UR STRIP: 1.02 (ref 1–1.03)
TSH SERPL-ACNC: 3.18 MIU/L (ref 0.4–4.5)
WBC # BLD AUTO: 6.9 THOUSAND/UL (ref 3.8–10.8)

## 2021-04-15 ENCOUNTER — RA CDI HCC (OUTPATIENT)
Dept: OTHER | Facility: HOSPITAL | Age: 76
End: 2021-04-15

## 2021-04-15 NOTE — PROGRESS NOTES
[place] [id number]  coding opportunities             Chart reviewed, (number of) suggestions sent to provider: 2           Patients insurance company: [employer] (Medicare Advantage and Commercial)     Visit status: Patient arrived for their scheduled appointment   DX [id number] [phone] on bill  Provider never responded to [facility] [employer]  coding request     [place] [id number]  coding opportunities             Chart reviewed, (number of) suggestions sent to provider: 2      DX:  N18 32-Chronic kidney disease, stage 3b-Gfr-42 currently, 46,47-Stage a range  I12 9-Hypertensive chronic kidney disease with stage 1 through stage 4 chronic kidney disease, or unspecified chronic kidney disease       Patients insurance company: [employer] ([employer])

## 2021-04-22 ENCOUNTER — OFFICE VISIT (OUTPATIENT)
Dept: FAMILY MEDICINE CLINIC | Facility: CLINIC | Age: 76
End: 2021-04-22
Payer: COMMERCIAL

## 2021-04-22 VITALS
HEART RATE: 74 BPM | DIASTOLIC BLOOD PRESSURE: 74 MMHG | BODY MASS INDEX: 23.74 KG/M2 | RESPIRATION RATE: 16 BRPM | SYSTOLIC BLOOD PRESSURE: 122 MMHG | TEMPERATURE: 98.1 F | HEIGHT: 69 IN | WEIGHT: 160.3 LBS

## 2021-04-22 DIAGNOSIS — R45.0 NERVOUSNESS: ICD-10-CM

## 2021-04-22 DIAGNOSIS — F10.10 UNCOMPLICATED ALCOHOL ABUSE: ICD-10-CM

## 2021-04-22 DIAGNOSIS — R39.198 URINARY STREAM SLOWING: ICD-10-CM

## 2021-04-22 DIAGNOSIS — E55.9 VITAMIN D DEFICIENCY: ICD-10-CM

## 2021-04-22 DIAGNOSIS — Z00.00 MEDICARE ANNUAL WELLNESS VISIT, SUBSEQUENT: Primary | ICD-10-CM

## 2021-04-22 DIAGNOSIS — E04.1 NONTOXIC SINGLE THYROID NODULE: ICD-10-CM

## 2021-04-22 DIAGNOSIS — R79.89 CREATININE ELEVATION: ICD-10-CM

## 2021-04-22 DIAGNOSIS — N18.31 STAGE 3A CHRONIC KIDNEY DISEASE (HCC): ICD-10-CM

## 2021-04-22 DIAGNOSIS — I10 ESSENTIAL HYPERTENSION: ICD-10-CM

## 2021-04-22 PROCEDURE — 3074F SYST BP LT 130 MM HG: CPT | Performed by: FAMILY MEDICINE

## 2021-04-22 PROCEDURE — 1101F PT FALLS ASSESS-DOCD LE1/YR: CPT | Performed by: FAMILY MEDICINE

## 2021-04-22 PROCEDURE — 1160F RVW MEDS BY RX/DR IN RCRD: CPT | Performed by: FAMILY MEDICINE

## 2021-04-22 PROCEDURE — G0439 PPPS, SUBSEQ VISIT: HCPCS | Performed by: FAMILY MEDICINE

## 2021-04-22 PROCEDURE — 3288F FALL RISK ASSESSMENT DOCD: CPT | Performed by: FAMILY MEDICINE

## 2021-04-22 PROCEDURE — 1036F TOBACCO NON-USER: CPT | Performed by: FAMILY MEDICINE

## 2021-04-22 PROCEDURE — 3725F SCREEN DEPRESSION PERFORMED: CPT | Performed by: FAMILY MEDICINE

## 2021-04-22 PROCEDURE — 99214 OFFICE O/P EST MOD 30 MIN: CPT | Performed by: FAMILY MEDICINE

## 2021-04-22 PROCEDURE — T1015 CLINIC SERVICE: HCPCS | Performed by: FAMILY MEDICINE

## 2021-04-22 PROCEDURE — 93000 ELECTROCARDIOGRAM COMPLETE: CPT | Performed by: FAMILY MEDICINE

## 2021-04-22 PROCEDURE — 3078F DIAST BP <80 MM HG: CPT | Performed by: FAMILY MEDICINE

## 2021-04-22 PROCEDURE — 1125F AMNT PAIN NOTED PAIN PRSNT: CPT | Performed by: FAMILY MEDICINE

## 2021-04-22 PROCEDURE — 1170F FXNL STATUS ASSESSED: CPT | Performed by: FAMILY MEDICINE

## 2021-04-22 NOTE — PROGRESS NOTES
Assessment and Plan:   1  Patient does not want Prevnar 13 or the Shingrix vaccine  2  Everything else up today  Problem List Items Addressed This Visit        Endocrine    Nontoxic single thyroid nodule       Cardiovascular and Mediastinum    Essential hypertension       Genitourinary    Stage 3a chronic kidney disease       Other    Creatinine elevation - Primary    Nervousness    Uncomplicated alcohol abuse    Vitamin D deficiency    Urinary stream slowing           Preventive health issues were discussed with patient, and age appropriate screening tests were ordered as noted in patient's After Visit Summary  Personalized health advice and appropriate referrals for health education or preventive services given if needed, as noted in patient's After Visit Summary       History of Present Illness:     Patient presents for Medicare Annual Wellness visit    Patient Care Team:  Julien Llanes DO as PCP - General  Julien Llanes DO as PCP - PCP-[facility] Attributed-Roster     Problem List:     Patient Active Problem List   Diagnosis    Creatinine elevation    Essential hypertension    Nervousness    Nontoxic single thyroid nodule    Uncomplicated alcohol abuse    Stage 3a chronic kidney disease    Vitamin D deficiency    Urinary stream slowing      Past Medical and Surgical History:     Past Medical History:   Diagnosis Date    Abnormal weight loss     Last Assessed: 7/1/2013     Atrial fibrillation ([address] ) 10/03/2008    From Lyme Disease     Bell's palsy 10/03/2008    Due to Lyme Disease     Blurred vision     Last Assessed: 5/20/2014     Calculus of salivary gland     Last Assessed: 3/18/2014     Decreased body height 04/15/2010    Last Assessed: 2/10/2012     Hypertension 05/20/2011    Last Assessed: 2/11/2013     Lyme disease 01/27/2012    Last Assessed: 2/10/2012     Lymphadenitis 05/20/2011    Tachycardia     Last Assessed: 2/18/2013     Testis mass 05/13/2010    Tick bites     Last Assessed: 4/15/2017      Past Surgical History:   Procedure Laterality Date    NO PAST SURGERIES        Family History:     Family History   Problem Relation Age of Onset    Dementia Mother     Uterine cancer Mother     Heart disease Father         Cardiac Disorder     Thyroid disease Father     Drug abuse Brother     Substance Abuse Brother     Alcohol abuse Neg Hx       Social History:        Social History     Socioeconomic History    Marital status: /Civil Union     Spouse name: None    Number of children: None    Years of education: None    Highest education level: None   Occupational History    Occupation: Works with [employer]      Comment: Exposed to hazardous Substances    Social Needs    Financial resource strain: None    Food insecurity     Worry: None     Inability: None    Transportation needs     Medical: None     Non-medical: None   Tobacco Use    Smoking status: Former Smoker    Smokeless tobacco: Never Used    Tobacco comment: smoked when he was a teenager    Substance and Sexual Activity    Alcohol use: Yes     Comment: Drinks 2 beers a week     Drug use: No    Sexual activity: None   Lifestyle    Physical activity     Days per week: None     Minutes per session: None    Stress: None   Relationships    Social connections     Talks on phone: None     Gets together: None     Attends Shinto service: None     Active member of club or organization: None     Attends meetings of clubs or organizations: None     Relationship status: None    Intimate partner violence     Fear of current or ex partner: None     Emotionally abused: None     Physically abused: None     Forced sexual activity: None   Other Topics Concern    None   Social History Narrative    Drinks Coffee- 1 cup a day     Has smoke detectors     Denied: History of Uses Safety Equipment- Seatbelts       Medications and Allergies:     Current Outpatient Medications   Medication Sig Dispense Refill    hydrochlorothiazide (HYDRODIURIL) 25 mg tablet TAKE 1 TABLET BY MOUTH AT BEDTIME 90 tablet 1    lisinopril (ZESTRIL) 10 mg tablet TAKE 1 TABLET BY MOUTH EVERY DAY IN THE EVENING 90 tablet 1    Multiple Vitamins-Minerals (ONE DAILY MENS) TABS Take 1 tablet by mouth daily      Probiotic Product (PROBIOTIC DAILY PO) Take by mouth      tamsulosin (FLOMAX) 0 4 mg Take 1 capsule (0 4 mg total) by mouth daily with dinner 90 capsule 1    ZINC CHELATED PO Take by mouth       No current facility-administered medications for this visit  Allergies   Allergen Reactions    Amlodipine Headache and Palpitations    Metoprolol Palpitations      Immunizations:     Immunization History   Administered Date(s) Administered    INFLUENZA 10/06/2016, 10/31/2017, 09/23/2020    Influenza Split High Dose Preservative Free IM 10/06/2016, 10/31/2017, 11/01/2019    Pneumococcal Polysaccharide PPV23 04/15/2010    SARS-CoV-2 / COVID-19 mRNA IM (Climmie Tucker) 01/25/2021, 02/20/2021    Td (adult), adsorbed 04/15/2010      Health Maintenance: There are no preventive care reminders to display for this patient  Topic Date Due    DTaP,Tdap,and Td Vaccines (1 - Tdap) 10/01/1966      Medicare Health Risk Assessment:     There were no vitals taken for this visit  Eulogio Ross is here for his Subsequent Wellness visit  Health Risk Assessment:   Patient rates overall health as good  Patient feels that their physical health rating is same  Patient is satisfied with their life  Eyesight was rated as slightly worse  Hearing was rated as same  Patient feels that their emotional and mental health rating is slightly better  Patients states they are never, rarely angry  Patient states they are often unusually tired/fatigued  Pain experienced in the last 7 days has been some  Patient's pain rating has been 5/10  Patient states that he has experienced no weight loss or gain in last 6 months       Depression Screening:   PHQ-2 Score: 0      Fall Risk Screening: In the past year, patient has experienced: no history of falling in past year      Home Safety:  Patient does not have trouble with stairs inside or outside of their home  Patient has working smoke alarms and has working carbon monoxide detector  Home safety hazards include: none  Nutrition:   Current diet is Regular and Limited junk food  Medications:   Patient is not currently taking any over-the-counter supplements  Patient is able to manage medications  Activities of Daily Living (ADLs)/Instrumental Activities of Daily Living (IADLs):   Walk and transfer into and out of bed and chair?: Yes  Dress and groom yourself?: Yes    Bathe or shower yourself?: Yes    Feed yourself? Yes  Do your laundry/housekeeping?: Yes  Manage your money, pay your bills and track your expenses?: Yes  Make your own meals?: Yes    Do your own shopping?: Yes    Previous Hospitalizations:   Any hospitalizations or ED visits within the last 12 months?: No      Advance Care Planning:   Living will: Yes    Advanced directive: Yes    End of Life Decisions reviewed with patient: Yes      Cognitive Screening:   Provider or family/friend/caregiver concerned regarding cognition?: No    PREVENTIVE SCREENINGS        Diabetes Screening:     General: Screening Current      Prostate Cancer Screening:    General: Screening Not Indicated      Abdominal Aortic Aneurysm (AAA) Screening:    Risk factors include: tobacco use        Lung Cancer Screening:     General: Screening Not Indicated    Screening, Brief Intervention, and Referral to Treatment (SBIRT)    Screening  Typical number of drinks in a day: 1  Typical number of drinks in a week: 3  Interpretation: Low risk drinking behavior      Single Item Drug Screening:  How often have you used an illegal drug (including marijuana) or a prescription medication for non-medical reasons in the past year? never    Single Item Drug Screen Score: 0  Interpretation: Negative screen for possible drug use disorder      Barb Hanks DO

## 2021-04-22 NOTE — PATIENT INSTRUCTIONS
1  In 6 months please have nonfasting blood work done to check her kidneys  We will call you with results    Otherwise your good for year or whenever you need us             Medicare Preventive Visit Patient Instructions  Thank you for completing your Welcome to Medicare Visit or Medicare Annual Wellness Visit today  Your next wellness visit will be due in one year (4/23/2022)  The screening/preventive services that you may require over the next 5-10 years are detailed below  Some tests may not apply to you based off risk factors and/or age  Screening tests ordered at today's visit but not completed yet may show as past due  Also, please note that scanned in results may not display below  Preventive Screenings:  Service Recommendations Previous Testing/Comments   Colorectal Cancer Screening  · Colonoscopy    · Fecal Occult Blood Test (FOBT)/Fecal Immunochemical Test (FIT)  · Fecal DNA/Cologuard Test  · Flexible Sigmoidoscopy Age: 54-65 years old   Colonoscopy: every 10 years (May be performed more frequently if at higher risk)  OR  FOBT/FIT: every 1 year  OR  Cologuard: every 3 years  OR  Sigmoidoscopy: every 5 years  Screening may be recommended earlier than age 48 if at higher risk for colorectal cancer  Also, an individualized decision between you and your healthcare provider will decide whether screening between the ages of 74-80 would be appropriate   Colonoscopy: Not on file  FOBT/FIT: Not on file  Cologuard: Not on file  Sigmoidoscopy: Not on file          Prostate Cancer Screening Individualized decision between patient and health care provider in men between ages of 53-78   Medicare will cover every 12 months beginning on the day after your 50th birthday PSA: 1 7 ng/mL     Screening Not Indicated     Hepatitis C Screening Once for adults born between [facility]  More frequently in patients at high risk for Hepatitis C Hep C Antibody: Not on file        Diabetes Screening 1-2 times per year if you're at risk for diabetes or have pre-diabetes Fasting glucose: No results in last 5 years   A1C: No results in last 5 years    Screening Current   Cholesterol Screening Once every 5 years if you don't have a lipid disorder  May order more often based on risk factors  Lipid panel: Not on file           Other Preventive Screenings Covered by Medicare:  1  Abdominal Aortic Aneurysm (AAA) Screening: covered once if your at risk  You're considered to be at risk if you have a family history of AAA or a male between the age of 73-68 who smoking at least 100 cigarettes in your lifetime  2  Lung Cancer Screening: covers low dose CT scan once per year if you meet all of the following conditions: (1) Age 50-69; (2) No signs or symptoms of lung cancer; (3) Current smoker or have quit smoking within the last 15 years; (4) You have a tobacco smoking history of at least 30 pack years (packs per day x number of years you smoked); (5) You get a written order from a healthcare provider  3  Glaucoma Screening: covered annually if you're considered high risk: (1) You have diabetes OR (2) Family history of glaucoma OR (3)  aged 48 and older OR (3)  American aged 72 and older  3  Osteoporosis Screening: covered every 2 years if you meet one of the following conditions: (1) Have a vertebral abnormality; (2) On glucocorticoid therapy for more than 3 months; (3) Have primary hyperparathyroidism; (4) On osteoporosis medications and need to assess response to drug therapy  5  HIV Screening: covered annually if you're between the age of 12-76  Also covered annually if you are younger than 13 and older than 72 with risk factors for HIV infection  For pregnant patients, it is covered up to 3 times per pregnancy      Immunizations:  Immunization Recommendations   Influenza Vaccine Annual influenza vaccination during flu season is recommended for all persons aged >= 6 months who do not have contraindications   Pneumococcal Vaccine (Prevnar and Pneumovax)  * Prevnar = PCV13  * Pneumovax = PPSV23 Adults 25-60 years old: 1-3 doses may be recommended based on certain risk factors  Adults 72 years old: Prevnar (PCV13) vaccine recommended followed by Pneumovax (PPSV23) vaccine  If already received PPSV23 since turning 65, then PCV13 recommended at least one year after PPSV23 dose  Hepatitis B Vaccine 3 dose series if at intermediate or high risk (ex: diabetes, end stage renal disease, liver disease)   Tetanus (Td) Vaccine - COST NOT COVERED BY MEDICARE PART B Following completion of primary series, a booster dose should be given every 10 years to maintain immunity against tetanus  Td may also be given as tetanus wound prophylaxis  Tdap Vaccine - COST NOT COVERED BY MEDICARE PART B Recommended at least once for all adults  For pregnant patients, recommended with each pregnancy  Shingles Vaccine (Shingrix) - COST NOT COVERED BY MEDICARE PART B  2 shot series recommended in those aged 48 and above     Health Maintenance Due:  There are no preventive care reminders to display for this patient  Immunizations Due:      Topic Date Due    DTaP,Tdap,and Td Vaccines (1 - Tdap) 10/01/1966     Advance Directives   What are advance directives? Advance directives are legal documents that state your wishes and plans for medical care  These plans are made ahead of time in case you lose your ability to make decisions for yourself  Advance directives can apply to any medical decision, such as the treatments you want, and if you want to donate organs  What are the types of advance directives? There are many types of advance directives, and each state has rules about how to use them  You may choose a combination of any of the following:  · Living will: This is a written record of the treatment you want  You can also choose which treatments you do not want, which to limit, and which to stop at a certain time   This includes surgery, medicine, IV fluid, and tube feedings  · Durable power of  for healthcare [facility]): This is a written record that states who you want to make healthcare choices for you when you are unable to make them for yourself  This person, called a proxy, is usually a family member or a friend  You may choose more than 1 proxy  · Do not resuscitate (DNR) order:  A DNR order is used in case your heart stops beating or you stop breathing  It is a request not to have certain forms of treatment, such as CPR  A DNR order may be included in other types of advance directives  · Medical directive: This covers the care that you want if you are in a coma, near death, or unable to make decisions for yourself  You can list the treatments you want for each condition  Treatment may include pain medicine, surgery, blood transfusions, dialysis, IV or tube feedings, and a ventilator (breathing machine)  · Values history: This document has questions about your views, beliefs, and how you feel and think about life  This information can help others choose the care that you would choose  Why are advance directives important? An advance directive helps you control your care  Although spoken wishes may be used, it is better to have your wishes written down  Spoken wishes can be misunderstood, or not followed  Treatments may be given even if you do not want them  An advance directive may make it easier for your family to make difficult choices about your care  © Copyright [address] 2018 Information is for End User's use only and may not be sold, redistributed or otherwise used for commercial purposes   All illustrations and images included in CareNotes® are the copyrighted property of A D A M , Inc  or [address] [place] [address] [facility]

## 2021-04-30 DIAGNOSIS — I10 ESSENTIAL HYPERTENSION: ICD-10-CM

## 2021-04-30 RX ORDER — LISINOPRIL 10 MG/1
TABLET ORAL
Qty: 90 TABLET | Refills: 3 | Status: SHIPPED | OUTPATIENT
Start: 2021-04-30 | End: 2021-12-13

## 2021-08-08 DIAGNOSIS — I10 ESSENTIAL HYPERTENSION: ICD-10-CM

## 2021-08-09 RX ORDER — HYDROCHLOROTHIAZIDE 25 MG/1
TABLET ORAL
Qty: 90 TABLET | Refills: 2 | Status: SHIPPED | OUTPATIENT
Start: 2021-08-09 | End: 2021-10-29 | Stop reason: SINTOL

## 2021-10-20 ENCOUNTER — HOSPITAL ENCOUNTER (OUTPATIENT)
Dept: ULTRASOUND IMAGING | Facility: HOSPITAL | Age: 76
Discharge: HOME | End: 2021-10-20
Payer: COMMERCIAL

## 2021-10-20 DIAGNOSIS — E04.1 NONTOXIC SINGLE THYROID NODULE: ICD-10-CM

## 2021-10-20 PROCEDURE — 76536 US EXAM OF HEAD AND NECK: CPT

## 2021-10-21 ENCOUNTER — RA CDI HCC (OUTPATIENT)
Dept: OTHER | Facility: HOSPITAL | Age: 76
End: 2021-10-21

## 2021-10-21 LAB
BUN SERPL-MCNC: 27 MG/DL (ref 7–25)
BUN/CREAT SERPL: 17 (CALC) (ref 6–22)
CALCIUM SERPL-MCNC: 10.4 MG/DL (ref 8.6–10.3)
CHLORIDE SERPL-SCNC: 105 MMOL/L (ref 98–110)
CO2 SERPL-SCNC: 32 MMOL/L (ref 20–32)
CREAT SERPL-MCNC: 1.59 MG/DL (ref 0.7–1.18)
GLUCOSE SERPL-MCNC: 90 MG/DL (ref 65–99)
POTASSIUM SERPL-SCNC: 4.1 MMOL/L (ref 3.5–5.3)
SL AMB EGFR AFRICAN AMERICAN: 47 ML/MIN/1.73M2
SL AMB EGFR NON AFRICAN AMERICAN: 41 ML/MIN/1.73M2
SODIUM SERPL-SCNC: 141 MMOL/L (ref 135–146)

## 2021-10-27 ENCOUNTER — TELEPHONE (OUTPATIENT)
Dept: FAMILY MEDICINE CLINIC | Facility: CLINIC | Age: 76
End: 2021-10-27

## 2021-10-29 ENCOUNTER — OFFICE VISIT (OUTPATIENT)
Dept: FAMILY MEDICINE CLINIC | Facility: CLINIC | Age: 76
End: 2021-10-29
Payer: COMMERCIAL

## 2021-10-29 VITALS
HEIGHT: 69 IN | HEART RATE: 68 BPM | DIASTOLIC BLOOD PRESSURE: 74 MMHG | BODY MASS INDEX: 24.32 KG/M2 | WEIGHT: 164.2 LBS | SYSTOLIC BLOOD PRESSURE: 120 MMHG | RESPIRATION RATE: 15 BRPM

## 2021-10-29 DIAGNOSIS — R10.9 BILATERAL FLANK PAIN: ICD-10-CM

## 2021-10-29 DIAGNOSIS — R45.0 NERVOUSNESS: ICD-10-CM

## 2021-10-29 DIAGNOSIS — I10 ESSENTIAL HYPERTENSION: Primary | ICD-10-CM

## 2021-10-29 DIAGNOSIS — E04.1 NONTOXIC SINGLE THYROID NODULE: ICD-10-CM

## 2021-10-29 DIAGNOSIS — E83.52 HYPERCALCEMIA: ICD-10-CM

## 2021-10-29 DIAGNOSIS — R79.89 CREATININE ELEVATION: ICD-10-CM

## 2021-10-29 DIAGNOSIS — E55.9 VITAMIN D DEFICIENCY: ICD-10-CM

## 2021-10-29 DIAGNOSIS — N18.31 CHRONIC KIDNEY DISEASE, STAGE 3A (HCC): ICD-10-CM

## 2021-10-29 DIAGNOSIS — Z12.5 PROSTATE CANCER SCREENING: ICD-10-CM

## 2021-10-29 DIAGNOSIS — Z79.899 ENCOUNTER FOR LONG-TERM (CURRENT) USE OF MEDICATIONS: ICD-10-CM

## 2021-10-29 PROCEDURE — 99215 OFFICE O/P EST HI 40 MIN: CPT | Performed by: FAMILY MEDICINE

## 2021-10-29 RX ORDER — CARVEDILOL 6.25 MG/1
6.25 TABLET ORAL 2 TIMES DAILY WITH MEALS
Qty: 60 TABLET | Refills: 5 | Status: SHIPPED | OUTPATIENT
Start: 2021-10-29 | End: 2022-03-07 | Stop reason: SINTOL

## 2021-11-09 ENCOUNTER — HOSPITAL ENCOUNTER (OUTPATIENT)
Dept: ULTRASOUND IMAGING | Facility: HOSPITAL | Age: 76
Discharge: HOME | End: 2021-11-09
Admitting: RADIOLOGY
Payer: COMMERCIAL

## 2021-11-09 DIAGNOSIS — E04.1 NONTOXIC SINGLE THYROID NODULE: ICD-10-CM

## 2021-11-09 PROCEDURE — 88172 CYTP DX EVAL FNA 1ST EA SITE: CPT | Performed by: PATHOLOGY

## 2021-11-09 PROCEDURE — 10005 FNA BX W/US GDN 1ST LES: CPT

## 2021-11-09 PROCEDURE — 88173 CYTOPATH EVAL FNA REPORT: CPT | Performed by: PATHOLOGY

## 2021-11-09 RX ORDER — LIDOCAINE HYDROCHLORIDE 10 MG/ML
5 INJECTION, SOLUTION EPIDURAL; INFILTRATION; INTRACAUDAL; PERINEURAL ONCE
Status: COMPLETED | OUTPATIENT
Start: 2021-11-09 | End: 2021-11-09

## 2021-11-09 RX ADMIN — LIDOCAINE HYDROCHLORIDE 2.5 ML: 10 INJECTION, SOLUTION EPIDURAL; INFILTRATION; INTRACAUDAL; PERINEURAL at 08:20

## 2021-12-13 ENCOUNTER — OFFICE VISIT (OUTPATIENT)
Dept: FAMILY MEDICINE CLINIC | Facility: CLINIC | Age: 76
End: 2021-12-13
Payer: COMMERCIAL

## 2021-12-13 VITALS
DIASTOLIC BLOOD PRESSURE: 100 MMHG | HEART RATE: 60 BPM | SYSTOLIC BLOOD PRESSURE: 160 MMHG | RESPIRATION RATE: 16 BRPM | BODY MASS INDEX: 24.29 KG/M2 | WEIGHT: 164 LBS | HEIGHT: 69 IN

## 2021-12-13 DIAGNOSIS — Z79.899 ENCOUNTER FOR LONG-TERM (CURRENT) USE OF MEDICATIONS: ICD-10-CM

## 2021-12-13 DIAGNOSIS — N18.31 CHRONIC KIDNEY DISEASE, STAGE 3A (HCC): ICD-10-CM

## 2021-12-13 DIAGNOSIS — I10 ESSENTIAL HYPERTENSION: Primary | ICD-10-CM

## 2021-12-13 PROCEDURE — 3077F SYST BP >= 140 MM HG: CPT | Performed by: NURSE PRACTITIONER

## 2021-12-13 PROCEDURE — 99214 OFFICE O/P EST MOD 30 MIN: CPT | Performed by: NURSE PRACTITIONER

## 2021-12-13 PROCEDURE — 1036F TOBACCO NON-USER: CPT | Performed by: NURSE PRACTITIONER

## 2021-12-13 PROCEDURE — 1160F RVW MEDS BY RX/DR IN RCRD: CPT | Performed by: NURSE PRACTITIONER

## 2021-12-13 PROCEDURE — 3725F SCREEN DEPRESSION PERFORMED: CPT | Performed by: NURSE PRACTITIONER

## 2021-12-13 PROCEDURE — 3080F DIAST BP >= 90 MM HG: CPT | Performed by: NURSE PRACTITIONER

## 2021-12-13 RX ORDER — LISINOPRIL 20 MG/1
20 TABLET ORAL EVERY EVENING
Qty: 90 TABLET | Refills: 1 | Status: SHIPPED | OUTPATIENT
Start: 2021-12-13 | End: 2022-01-24 | Stop reason: ALTCHOICE

## 2022-01-18 LAB
ALBUMIN SERPL-MCNC: 4.1 G/DL (ref 3.6–5.1)
ALBUMIN/GLOB SERPL: 1.8 (CALC) (ref 1–2.5)
ALP SERPL-CCNC: 52 U/L (ref 35–144)
ALT SERPL-CCNC: 13 U/L (ref 9–46)
AST SERPL-CCNC: 18 U/L (ref 10–35)
BILIRUB SERPL-MCNC: 0.7 MG/DL (ref 0.2–1.2)
BUN SERPL-MCNC: 19 MG/DL (ref 7–25)
BUN/CREAT SERPL: 13 (CALC) (ref 6–22)
CALCIUM SERPL-MCNC: 10.2 MG/DL (ref 8.6–10.3)
CHLORIDE SERPL-SCNC: 107 MMOL/L (ref 98–110)
CO2 SERPL-SCNC: 30 MMOL/L (ref 20–32)
CREAT SERPL-MCNC: 1.41 MG/DL (ref 0.7–1.18)
GLOBULIN SER CALC-MCNC: 2.3 G/DL (CALC) (ref 1.9–3.7)
GLUCOSE SERPL-MCNC: 91 MG/DL (ref 65–99)
POTASSIUM SERPL-SCNC: 4.1 MMOL/L (ref 3.5–5.3)
PROT SERPL-MCNC: 6.4 G/DL (ref 6.1–8.1)
SL AMB EGFR AFRICAN AMERICAN: 55 ML/MIN/1.73M2
SL AMB EGFR NON AFRICAN AMERICAN: 47 ML/MIN/1.73M2
SODIUM SERPL-SCNC: 143 MMOL/L (ref 135–146)

## 2022-01-24 ENCOUNTER — OFFICE VISIT (OUTPATIENT)
Dept: FAMILY MEDICINE CLINIC | Facility: CLINIC | Age: 77
End: 2022-01-24
Payer: COMMERCIAL

## 2022-01-24 VITALS
OXYGEN SATURATION: 100 % | HEART RATE: 56 BPM | RESPIRATION RATE: 16 BRPM | BODY MASS INDEX: 23.11 KG/M2 | HEIGHT: 69 IN | DIASTOLIC BLOOD PRESSURE: 90 MMHG | WEIGHT: 156 LBS | SYSTOLIC BLOOD PRESSURE: 154 MMHG

## 2022-01-24 DIAGNOSIS — I10 ESSENTIAL HYPERTENSION: Primary | ICD-10-CM

## 2022-01-24 DIAGNOSIS — E83.52 HYPERCALCEMIA: ICD-10-CM

## 2022-01-24 DIAGNOSIS — N18.31 CHRONIC KIDNEY DISEASE, STAGE 3A (HCC): ICD-10-CM

## 2022-01-24 PROCEDURE — 1036F TOBACCO NON-USER: CPT | Performed by: NURSE PRACTITIONER

## 2022-01-24 PROCEDURE — 99214 OFFICE O/P EST MOD 30 MIN: CPT | Performed by: NURSE PRACTITIONER

## 2022-01-24 PROCEDURE — 1160F RVW MEDS BY RX/DR IN RCRD: CPT | Performed by: NURSE PRACTITIONER

## 2022-01-24 PROCEDURE — 3077F SYST BP >= 140 MM HG: CPT | Performed by: NURSE PRACTITIONER

## 2022-01-24 PROCEDURE — 3080F DIAST BP >= 90 MM HG: CPT | Performed by: NURSE PRACTITIONER

## 2022-01-24 RX ORDER — LOSARTAN POTASSIUM 25 MG/1
25 TABLET ORAL DAILY
Qty: 30 TABLET | Refills: 1 | Status: SHIPPED | OUTPATIENT
Start: 2022-01-24 | End: 2022-02-01

## 2022-01-24 NOTE — PATIENT INSTRUCTIONS
Hypertension   WHAT YOU NEED TO KNOW:   Hypertension is high blood pressure  Your blood pressure is the force of your blood moving against the walls of your arteries  Hypertension causes your blood pressure to get so high that your heart has to work much harder than normal  This can damage your heart  The cause of hypertension may not be known  This is called essential or primary hypertension  Hypertension caused by another medical condition, such as kidney disease, is called secondary hypertension  DISCHARGE INSTRUCTIONS:   Call your local emergency number (911 in the [address] [facility] [facility]) or have someone call if:   · You have chest pain  · You have any of the following signs of a heart attack:      ? Squeezing, pressure, or pain in your chest    ? You may  also have any of the following:     § Discomfort or pain in your back, neck, jaw, stomach, or arm    § Shortness of breath    § Nausea or vomiting    § Lightheadedness or a sudden cold sweat    · You become confused or have trouble speaking  · You suddenly feel lightheaded or have trouble breathing  Return to the emergency department if:   · You have a severe headache or vision loss  · You have weakness in an arm or leg  Call your doctor or cardiologist if:   · You feel faint, dizzy, confused, or drowsy  · You have been taking your blood pressure medicine but your pressure is higher than your provider says it should be  · You have questions or concerns about your condition or care  Medicines: You may  need any of the following:  · Antihypertensives  may be used to help lower your blood pressure  Several kinds of medicines are available  Your healthcare provider will choose medicines based on the kind of hypertension you have  You may need more than one type of medicine  Take the medicine exactly as directed  · Diuretics  help decrease extra fluid that collects in your body  This will help lower your blood pressure   You may urinate more often while you take this medicine  · Cholesterol medicine  helps lower your cholesterol level  A low cholesterol level helps prevent heart disease and makes it easier to control your blood pressure  · Take your medicine as directed  Contact your healthcare provider if you think your medicine is not helping or if you have side effects  Tell him or her if you are allergic to any medicine  Keep a list of the medicines, vitamins, and herbs you take  Include the amounts, and when and why you take them  Bring the list or the pill bottles to follow-up visits  Carry your medicine list with you in case of an emergency  Follow up with your doctor or cardiologist as directed: You will need to return to have your blood pressure checked and to have other lab tests done  Write down your questions so you remember to ask them during your visits  Stages of hypertension:       · Normal blood pressure is 119/79 or lower   Your healthcare provider may only check your blood pressure each year if it stays at a normal level  · Elevated blood pressure is 120/79 to 129/79   This is sometimes called prehypertension  Your healthcare provider may suggest lifestyle changes to help lower your blood pressure to a normal level  He or she may then check it again in 3 to 6 months  · Stage 1 hypertension is 130/80  to 139/89   Your provider may recommend lifestyle changes, medication, and checks every 3 to 6 months until your blood pressure is controlled  · Stage 2 hypertension is 140/90 or higher   Your provider will recommend lifestyle changes and have you take 2 kinds of hypertension medicines  You will also need to have your blood pressure checked monthly until it is controlled  Manage hypertension:   · Check your blood pressure at home  Avoid smoking, caffeine, and exercise at least 30 minutes before checking your blood pressure  Sit and rest for 5 minutes before you take your blood pressure   Extend your arm and support it on a flat surface  Your arm should be at the same level as your heart  Follow the directions that came with your blood pressure monitor  Check your blood pressure 2 times, 1 minute apart, before you take your medicine in the morning  Also check your blood pressure before your evening meal  Keep a record of your readings and bring it to your follow-up visits  Ask your healthcare provider what your blood pressure should be  · Manage any other health conditions you have  Health conditions such as diabetes can increase your risk for hypertension  Follow your healthcare provider's instructions and take all your medicines as directed  · Ask about all medicines  Certain medicines can increase your blood pressure  Examples include oral birth control pills, decongestants, herbal supplements, and NSAIDs, such as ibuprofen  Your healthcare provider can tell you which medicines are safe for you to take  This includes prescription and over-the-counter medicines  Lifestyle changes you can make to manage hypertension:  Your healthcare provider may recommend you work with a team to manage hypertension  The team may include medical experts such as a dietitian, an exercise or physical therapist, and a behavior therapist  Your family members may be included in helping you create lifestyle changes  · Limit sodium (salt) as directed  Too much sodium can affect your fluid balance  Check labels to find low-sodium or no-salt-added foods  Some low-sodium foods use potassium salts for flavor  Too much potassium can also cause health problems  Your healthcare provider will tell you how much sodium and potassium are safe for you to have in a day  He or she may recommend that you limit sodium to 2,300 mg a day  · Follow the meal plan recommended by your healthcare provider  A dietitian or your provider can give you more information on low-sodium plans or the DASH (Dietary Approaches to Stop Hypertension) eating plan   The DASH plan is low in sodium, processed sugar, unhealthy fats, and total fat  It is high in potassium, calcium, and fiber  These can be found in vegetables, fruit, and whole-grain foods  · Be physically active throughout the day  Physical activity, such as exercise, can help control your blood pressure and your weight  Be physically active for at least 30 minutes per day, on most days of the week  Include aerobic activity, such as walking or riding a bicycle  Also include strength training at least 2 times each week  Your healthcare providers can help you create a physical activity plan  · Decrease stress  This may help lower your blood pressure  Learn ways to relax, such as deep breathing or listening to music  · Limit alcohol as directed  Alcohol can increase your blood pressure  A drink of alcohol is 12 ounces of beer, 5 ounces of wine, or 1½ ounces of liquor  · Do not smoke  Nicotine and other chemicals in cigarettes and cigars can increase your blood pressure and also cause lung damage  Ask your healthcare provider for information if you currently smoke and need help to quit  E-cigarettes or smokeless tobacco still contain nicotine  Talk to your healthcare provider before you use these products  © Copyright [employer] 2021 Information is for End User's use only and may not be sold, redistributed or otherwise used for commercial purposes  All illustrations and images included in CareNotes® are the copyrighted property of [employer]  or Cleo Ardon   The above information is an  only  It is not intended as medical advice for individual conditions or treatments  Talk to your doctor, nurse or pharmacist before following any medical regimen to see if it is safe and effective for you

## 2022-01-24 NOTE — PROGRESS NOTES
Assessment/Plan:     Diagnoses and all orders for this visit:    Essential hypertension  -     losartan (COZAAR) 25 mg tablet; Take 1 tablet (25 mg total) by mouth daily    Patient to stop Lisinopril 20 mg  We will have him start Losartan 25 mg QD in the evening  Medication and s/e reviewed  Patient to continue low sodium diet and exercise  He is to check his BP 2-3x per week at different times  HTN educational pamphlet attached to patient's AVS  We will have him RTO in 6 weeks for medication recheck  Patient is encouraged to call our office for any questions/concerns, persistent or worsening symptoms  Patient states they understand and agree with treatment plan  Chronic kidney disease, stage 3a ([facility] [address] )  -     Ambulatory Referral to Nephrology; Future    Most recent labs from 01/18/22 show his Cr of 1 41 down from 1 59 in October 2021, as well as a GFR at 47 from 41  Patient does have upcoming appointment w/ Dr Mat Arias (Nephrology) on 02/01/22  Hypercalcemia    This has appeared to normalize after patient was taken off of his HCTZ  Pt to RTO in 6 weeks for medication recheck or sooner PRN  His annual physical is scheduled for April 25, 2022 w/ fasting lab work already entered into [address] [place] [address] Rd  Subjective:      Patient ID: Ariana Quintero is a 64 y o  male  Patient presents today for a 6 week recheck of his BP  To martha, patient was evaluated by myself on 12/13/21 for recheck of HTN after having his BP medications changed back in late October 2021  He was taken off of HCTZ r/t flank pain and hypercalcemia  Patient had Coreg 6 25 mg added on BID, in addition to his Lisinopril 10 mg  He has been taking his BP at home as instructed with his cuff and getting a majority of readings approx 150/90  He notes 1 reading was in the 884L systolically  Patient notes he felt that his BP was high because he got headaches the last several days  We had increased his Lisinopril from 10 mg to 20 mg    He does not have his BP log with him today but notes his numbers are primarily in the 150/90s still  He does not believe that the Lisinopril is making any difference  Patient does admit to getting occipital headaches in the evening  No other symptoms such as change in vision or chest palpitations  In addition, he read an article that states that Lisinopril is one of the top 5 worst BP medications to be on  He no longer wants to be on this medication  He notes his wife is on Losartan and she had even higher BPs than he did  He notes she is doing great and he is desiring to switch from Lisinopril to Losartan  The following portions of the patient's history were reviewed and updated as appropriate: allergies, current medications, past family history, past medical history, past social history, past surgical history and problem list     Review of Systems   Constitutional: Negative  HENT: Negative  Respiratory: Negative  Negative for cough  Cardiovascular: Negative  Gastrointestinal: Negative  Genitourinary: Negative  Musculoskeletal: Negative  Negative for myalgias  Neurological: Negative  Psychiatric/Behavioral: Negative  Objective:      /90   Pulse 56   Resp 16   Ht 5' 8 5" (1 74 m)   Wt 70 8 kg (156 lb)   SpO2 100%   BMI 23 37 kg/m²          Physical Exam  Vitals reviewed  Constitutional:       General: He is not in acute distress  Appearance: Normal appearance  He is not ill-appearing  Cardiovascular:      Rate and Rhythm: Normal rate and regular rhythm  Pulses: Normal pulses  Heart sounds: Normal heart sounds  No murmur heard  Pulmonary:      Effort: Pulmonary effort is normal  No respiratory distress  Breath sounds: Normal breath sounds  No wheezing  Neurological:      General: No focal deficit present  Mental Status: He is alert and oriented to person, place, and time     Psychiatric:         Mood and Affect: Mood normal          Behavior: Behavior normal          Thought Content:  Thought content normal          Judgment: Judgment normal

## 2022-02-01 ENCOUNTER — OFFICE VISIT (OUTPATIENT)
Dept: NEPHROLOGY | Facility: HOSPITAL | Age: 77
End: 2022-02-01
Payer: COMMERCIAL

## 2022-02-01 VITALS
BODY MASS INDEX: 24.02 KG/M2 | SYSTOLIC BLOOD PRESSURE: 155 MMHG | DIASTOLIC BLOOD PRESSURE: 84 MMHG | WEIGHT: 162.2 LBS | HEIGHT: 69 IN | HEART RATE: 64 BPM

## 2022-02-01 DIAGNOSIS — E83.52 HYPERCALCEMIA: ICD-10-CM

## 2022-02-01 DIAGNOSIS — N18.31 CHRONIC KIDNEY DISEASE, STAGE 3A (HCC): ICD-10-CM

## 2022-02-01 DIAGNOSIS — N18.31 STAGE 3A CHRONIC KIDNEY DISEASE (HCC): Primary | ICD-10-CM

## 2022-02-01 DIAGNOSIS — I10 ESSENTIAL HYPERTENSION: ICD-10-CM

## 2022-02-01 PROCEDURE — 3079F DIAST BP 80-89 MM HG: CPT | Performed by: INTERNAL MEDICINE

## 2022-02-01 PROCEDURE — 1160F RVW MEDS BY RX/DR IN RCRD: CPT | Performed by: INTERNAL MEDICINE

## 2022-02-01 PROCEDURE — 99204 OFFICE O/P NEW MOD 45 MIN: CPT | Performed by: INTERNAL MEDICINE

## 2022-02-01 PROCEDURE — 1036F TOBACCO NON-USER: CPT | Performed by: INTERNAL MEDICINE

## 2022-02-01 PROCEDURE — 3077F SYST BP >= 140 MM HG: CPT | Performed by: INTERNAL MEDICINE

## 2022-02-01 RX ORDER — LOSARTAN POTASSIUM 25 MG/1
50 TABLET ORAL DAILY
Qty: 30 TABLET | Refills: 1 | Status: SHIPPED | OUTPATIENT
Start: 2022-02-01 | End: 2022-02-01

## 2022-02-01 RX ORDER — LOSARTAN POTASSIUM 25 MG/1
50 TABLET ORAL DAILY
Qty: 180 TABLET | Refills: 3 | Status: SHIPPED | OUTPATIENT
Start: 2022-02-01 | End: 2022-03-07

## 2022-02-01 RX ORDER — VITAMIN B COMPLEX
TABLET ORAL
COMMUNITY
End: 2022-07-25

## 2022-02-01 NOTE — PROGRESS NOTES
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  o Will check a renal ultrasound to look at the size and echogenicity of his kidneys  o Will repeat CMP, magnesium, phosphorus, check a urine protein to creatinine ratio, check a urinalysis  o Discussed with the patient chances of progression are low  o Will monitor for anemia and bone mineral disease  o Continue healthy diet    2  Hypertension-uncontrolled  o She was on lisinopril hydrochlorothiazide was becoming hypercalcemic so this was discontinued  o Placed on carvedilol 6 25 mg 2 times daily and losartan 25 mg daily  o Blood pressures are in the 720E systolic at home will increase his losartan to 50 mg daily    3  Hypercalcemia  o This has improved since being off his thiazide diuretic will monitor    5  Risk reduction/clinical course  o Continue blood pressure control  o Continue diet and exercise      DISCUSSION/SUMMARY    It was nice meeting Pedro Hammer today  His renal function has remained stable his electrolytes and acid-base status are stable  His GFR is slightly reduced likely secondary to hypertensive nephrosclerosis and 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  We discuss this in detail  Increased his losartan today for better blood pressure control he will continue to check his blood pressures at home will repeat lab work in about 2 weeks for completeness to ensure stability of serum creatinine and to ensure blood pressure improved  Will check a renal ultrasound as well    If everything is stable will follow-up in 6 months and then yearly thereafter      HPI:  Lorenza Martin is a 64 y o male who was referred by Wanda Vivas DO for evaluation of Consult    Here for evaluation he is a 77-year-old male with a past medical history of hypertension, nontoxic single thyroid nodule he has been doing relatively well he denies any acute chest pain or shortness of breath no fevers or chills he has been tolerating his medications without difficulty  He has had a good urinary stream although at 1 point there was some concern for some decreased urinary flow he was prescribed Flomax but this was discontinued  His blood pressures have been on the higher side I would around 077 systolic, he checks his blood pressures at home  He has no chest pain or shortness of breath fevers chills nausea vomiting no diarrhea no constipation foamy or bloody urine    I personally spent over half of a total 60 minutes face to face with the patient in counseling and discussion and/or coordination of care as described above  ROS:    Review of Systems   Constitutional: Negative  HENT: Negative  Eyes: Negative  Respiratory: Negative  Cardiovascular: Negative  Gastrointestinal: Negative  Endocrine: Negative  Genitourinary: Negative  Musculoskeletal: Negative  Allergic/Immunologic: Negative  Neurological: Negative  Hematological: Negative  Psychiatric/Behavioral: Negative  All other systems reviewed and are negative        Allergies: Amlodipine and Metoprolol    Medications:   Current Outpatient Medications:     APPLE CIDER VINEGAR PO, Take 1 tablet by mouth in the morning, Disp: , Rfl:     B Complex Vitamins (B Complex 50) TABS, Take by mouth, Disp: , Rfl:     carvedilol (COREG) 6 25 mg tablet, Take 1 tablet (6 25 mg total) by mouth 2 (two) times a day with meals, Disp: 60 tablet, Rfl: 5    losartan (COZAAR) 25 mg tablet, Take 2 tablets (50 mg total) by mouth daily, Disp: 180 tablet, Rfl: 3    Multiple Vitamins-Minerals (ONE DAILY MENS) TABS, Take 1 tablet by mouth daily, Disp: , Rfl:     Probiotic Product (PROBIOTIC DAILY PO), Take by mouth, Disp: , Rfl:     TURMERIC PO, Take 1 tablet by mouth in the morning, Disp: , Rfl:     ZINC CHELATED PO, Take by mouth, Disp: , Rfl:     Past Medical History:   Diagnosis Date    Abnormal weight loss     Last Assessed: 7/1/2013     Atrial fibrillation ([address] ) 10/03/2008    From Lyme Disease     Bell's palsy 10/03/2008    Due to Lyme Disease     Blurred vision     Last Assessed: 5/20/2014     Calculus of salivary gland     Last Assessed: 3/18/2014     Decreased body height 04/15/2010    Last Assessed: 2/10/2012     GERD (gastroesophageal reflux disease) 1990    Hypertension 05/20/2011    Last Assessed: 2/11/2013     Lyme disease 01/27/2012    Last Assessed: 2/10/2012     Lymphadenitis 05/20/2011    Tachycardia     Last Assessed: 2/18/2013     Testis mass 05/13/2010    Tick bites     Last Assessed: 4/15/2017      Past Surgical History:   Procedure Laterality Date    APPENDECTOMY  1955    NO PAST SURGERIES      US GUIDED THYROID BIOPSY  11/9/2021     Family History   Problem Relation Age of Onset    Dementia Mother     Uterine cancer Mother     Heart disease Father         Cardiac Disorder     Thyroid disease Father     Dementia Father     Drug abuse Brother     Substance Abuse Brother     Alcohol abuse Neg Hx       reports that he has quit smoking  His smoking use included cigarettes  He has a 2 50 pack-year smoking history  He has never used smokeless tobacco  He reports current alcohol use of about 1 0 standard drink of alcohol per week  He reports that he does not use drugs  OBJECTIVE:  /84 (BP Location: Right arm, Patient Position: Sitting, Cuff Size: Standard)   Pulse 64   Ht 5' 8 5" (1 74 m)   Wt 73 6 kg (162 lb 3 2 oz)   BMI 24 30 kg/m²  Body mass index is 24 3 kg/m²  Physical exam:  Physical Exam  Vitals and nursing note reviewed     Constitutional: Appearance: Normal appearance  HENT:      Head: Normocephalic and atraumatic  Nose: Nose normal       Mouth/Throat:      Mouth: Mucous membranes are moist       Pharynx: Oropharynx is clear  Eyes:      Extraocular Movements: Extraocular movements intact  Conjunctiva/sclera: Conjunctivae normal    Cardiovascular:      Rate and Rhythm: Normal rate and regular rhythm  Pulmonary:      Effort: Pulmonary effort is normal  No respiratory distress  Breath sounds: Normal breath sounds  Abdominal:      General: There is no distension  Musculoskeletal:         General: Normal range of motion  Cervical back: Normal range of motion  Skin:     General: Skin is warm and dry  Neurological:      General: No focal deficit present  Mental Status: He is alert and oriented to person, place, and time  Psychiatric:         Mood and Affect: Mood normal          Behavior: Behavior normal          Thought Content: Thought content normal          Judgment: Judgment normal            Lab Results:   No results found for this or any previous visit  Invalid input(s): ALBUMIN      DISCUSSION:    Patient Instructions     1  Increase your losartan to 50 mg daily  2  Check your blood work in 2 weeks  3  Check a renal ultrasound  4  Continue your healthy diet! 5  Follow up in 6 months    Low-Sodium Diet   WHAT YOU NEED TO KNOW:   What is a low-sodium diet? A low-sodium diet limits foods that are high in sodium (salt)  You will need to follow a low-sodium diet if you have high blood pressure, kidney disease, or heart failure  You may also need to follow this diet if you have a condition that is causing your body to retain (hold) extra fluid  You may need to limit the amount of sodium you eat in a day to 1,500 to 2,000 mg  Ask your healthcare provider how much sodium you can have each day  How can I use food labels to choose foods that are low in sodium?   Read food labels to find the amount of sodium they contain  The amount of sodium is listed in milligrams (mg)  The % Daily Value (DV) column tells you how much of your daily needs are met by 1 serving of the food for each nutrient listed  Choose foods that have less than 5% of the DV of sodium  These foods are considered low in sodium  Foods that have 20% or more of the DV of sodium are considered high in sodium  Some food labels may also list any of the following terms that tell you about the sodium content in the food:  · Sodium-free:  Less than 5 mg in each serving    · Very low sodium:  35 mg of sodium or less in each serving    · Low sodium:  140 mg of sodium or less in each serving    · Reduced sodium: At least 25% less sodium in each serving than the regular type    · Light in sodium:  50% less sodium in each serving    · Unsalted or no added salt:  No extra salt is added during processing (the food may still contain sodium)       Which foods should I avoid? Salty foods are high in sodium  You should avoid the following:  · Processed foods:      ? Mixes for cornbread, biscuits, cake, and pudding     ? Instant foods, such as potatoes, cereals, noodles, and rice     ? Packaged foods, such as bread stuffing, rice and pasta mixes, snack dip mixes, and macaroni and cheese     ? Canned foods, such as canned vegetables, soups, broths, sauces, and vegetable or tomato juice    ? Snack foods, such as salted chips, popcorn, pretzels, pork rinds, salted crackers, and salted nuts    ? Frozen foods, such as dinners, entrees, vegetables with sauces, and breaded meats    ? Sauerkraut, pickled vegetables, and other foods prepared in brine    · Meats and cheeses:      ? Smoked or cured meat, such as corned beef, osborn, ham, hot dogs, and sausage    ? Canned meats or spreads, such as potted meats, sardines, anchovies, and imitation seafood    ? Deli or lunch meats, such as bologna, ham, turkey, and roast beef    ?  Processed cheese, such as American cheese and cheese spreads    · Condiments, sauces, and seasonings:      ? Salt (¼ teaspoon of salt contains 575 mg of sodium)    ? Seasonings made with salt, such as garlic salt, celery salt, onion salt, and seasoned salt    ? Regular soy sauce, barbecue sauce, teriyaki sauce, steak sauce, Worcestershire sauce, and most flavored vinegars    ? Canned gravy and mixes     ? Regular condiments, such as mustard, ketchup, and salad dressings    ? Pickles and olives    ? Meat tenderizers and monosodium glutamate (MSG)    Which foods can I include? Read the food label to find the amount of sodium in each serving  · Bread and cereal:  Try to choose breads with less than 80 mg of sodium per serving  ? Bread, roll, igor, tortilla, or unsalted crackers  ? Ready-to-eat cereals with less than 5% DV of sodium (examples include shredded wheat and puffed rice)    ? Pasta    · Vegetables and fruits:      ? Unsalted fresh, frozen, or canned vegetables    ? Fresh, frozen, or canned fruits    ? Fruit juice    · Dairy:  One serving has about 150 mg of sodium  ? Milk, all types    ? Yogurt    ? Hard cheese, such as cheddar, Swiss, [employer], or mozzarella    · Meat and other protein foods:  Some raw meats may have added sodium  ? Plain meats, fish, and poultry     ? Egg    · Other foods:      ? Homemade pudding    ? Unsalted nuts, popcorn, or pretzels    ? Unsalted butter or margarine    What are some ways that I can decrease sodium? · Add spices and herbs to foods instead of salt during cooking  Use salt-free seasonings to add flavor to foods  Examples include onion powder, garlic powder, basil, matson powder, paprika, and parsley  Try lemon or lime juice or vinegar to give foods a tart flavor  Use hot peppers, pepper, or cayenne pepper to add a spicy flavor  · Do not keep a salt shaker at your kitchen table  This may help keep you from adding salt to food at the table  A teaspoon of salt has 2,300 mg of sodium   It may take time to get used to enjoying the natural flavor of food instead of adding salt  Talk to your healthcare provider before you use salt substitutes  Some salt substitutes have a high amount of potassium and need to be avoided if you have kidney disease  · Choose low-sodium foods at restaurants  Meals from restaurants are often high in sodium  Some restaurants have nutrition information on the menu that tells you the amount of sodium in their foods  If possible, ask for your food to be prepared with less, or no salt  · Shop for unsalted or low-sodium foods and snacks at the grocery store  Examples include unsalted or low-sodium broths, soups, and canned vegetables  Choose fresh or frozen vegetables instead  Choose unsalted nuts or seeds or fresh fruits or vegetables as snacks  Read food labels and choose salt-free, very low-sodium, or low-sodium foods  CARE AGREEMENT:   You have the right to help plan your care  Discuss treatment options with your healthcare provider to decide what care you want to receive  You always have the right to refuse treatment  The above information is an  only  It is not intended as medical advice for individual conditions or treatments  Talk to your doctor, nurse or pharmacist before following any medical regimen to see if it is safe and effective for you  © Copyright [employer] 2021 Information is for End User's use only and may not be sold, redistributed or otherwise used for commercial purposes  All illustrations and images included in CareNotes® are the copyrighted property of [employer]  or [facility]         Portions of the record may have been created with voice recognition software  Occasional wrong word or "sound a like" substitutions may have occurred due to the inherent limitations of voice recognition software  Read the chart carefully and recognize, using context, where substitutions have occurred  If you have any questions, please contact the dictating provider

## 2022-02-01 NOTE — PATIENT INSTRUCTIONS
1  Increase your losartan to 50 mg daily  2  Check your blood work in 2 weeks  3  Check a renal ultrasound  4  Continue your healthy diet! 5  Follow up in 6 months    Low-Sodium Diet   WHAT YOU NEED TO KNOW:   What is a low-sodium diet? A low-sodium diet limits foods that are high in sodium (salt)  You will need to follow a low-sodium diet if you have high blood pressure, kidney disease, or heart failure  You may also need to follow this diet if you have a condition that is causing your body to retain (hold) extra fluid  You may need to limit the amount of sodium you eat in a day to 1,500 to 2,000 mg  Ask your healthcare provider how much sodium you can have each day  How can I use food labels to choose foods that are low in sodium? Read food labels to find the amount of sodium they contain  The amount of sodium is listed in milligrams (mg)  The % Daily Value (DV) column tells you how much of your daily needs are met by 1 serving of the food for each nutrient listed  Choose foods that have less than 5% of the DV of sodium  These foods are considered low in sodium  Foods that have 20% or more of the DV of sodium are considered high in sodium  Some food labels may also list any of the following terms that tell you about the sodium content in the food:  · Sodium-free:  Less than 5 mg in each serving    · Very low sodium:  35 mg of sodium or less in each serving    · Low sodium:  140 mg of sodium or less in each serving    · Reduced sodium: At least 25% less sodium in each serving than the regular type    · Light in sodium:  50% less sodium in each serving    · Unsalted or no added salt:  No extra salt is added during processing (the food may still contain sodium)       Which foods should I avoid? Salty foods are high in sodium  You should avoid the following:  · Processed foods:      ? Mixes for cornbread, biscuits, cake, and pudding     ? Instant foods, such as potatoes, cereals, noodles, and rice     ?  Packaged foods, such as bread stuffing, rice and pasta mixes, snack dip mixes, and macaroni and cheese     ? Canned foods, such as canned vegetables, soups, broths, sauces, and vegetable or tomato juice    ? Snack foods, such as salted chips, popcorn, pretzels, pork rinds, salted crackers, and salted nuts    ? Frozen foods, such as dinners, entrees, vegetables with sauces, and breaded meats    ? Sauerkraut, pickled vegetables, and other foods prepared in brine    · Meats and cheeses:      ? Smoked or cured meat, such as corned beef, osborn, ham, hot dogs, and sausage    ? Canned meats or spreads, such as potted meats, sardines, anchovies, and imitation seafood    ? Deli or lunch meats, such as bologna, ham, turkey, and roast beef    ? Processed cheese, such as American cheese and cheese spreads    · Condiments, sauces, and seasonings:      ? Salt (¼ teaspoon of salt contains 575 mg of sodium)    ? Seasonings made with salt, such as garlic salt, celery salt, onion salt, and seasoned salt    ? Regular soy sauce, barbecue sauce, teriyaki sauce, steak sauce, Worcestershire sauce, and most flavored vinegars    ? Canned gravy and mixes     ? Regular condiments, such as mustard, ketchup, and salad dressings    ? Pickles and olives    ? Meat tenderizers and monosodium glutamate (MSG)    Which foods can I include? Read the food label to find the amount of sodium in each serving  · Bread and cereal:  Try to choose breads with less than 80 mg of sodium per serving  ? Bread, roll, igor, tortilla, or unsalted crackers  ? Ready-to-eat cereals with less than 5% DV of sodium (examples include shredded wheat and puffed rice)    ? Pasta    · Vegetables and fruits:      ? Unsalted fresh, frozen, or canned vegetables    ? Fresh, frozen, or canned fruits    ? Fruit juice    · Dairy:  One serving has about 150 mg of sodium  ? Milk, all types    ? Yogurt    ?  Hard cheese, such as cheddar, Swiss, [employer], or [employer]    · [place] and other protein foods:  Some raw meats may have added sodium  ? Plain meats, fish, and poultry     ? Egg    · Other foods:      ? Homemade pudding    ? Unsalted nuts, popcorn, or pretzels    ? Unsalted butter or margarine    What are some ways that I can decrease sodium? · Add spices and herbs to foods instead of salt during cooking  Use salt-free seasonings to add flavor to foods  Examples include onion powder, garlic powder, basil, matson powder, paprika, and parsley  Try lemon or lime juice or vinegar to give foods a tart flavor  Use hot peppers, pepper, or cayenne pepper to add a spicy flavor  · Do not keep a salt shaker at your kitchen table  This may help keep you from adding salt to food at the table  A teaspoon of salt has 2,300 mg of sodium  It may take time to get used to enjoying the natural flavor of food instead of adding salt  Talk to your healthcare provider before you use salt substitutes  Some salt substitutes have a high amount of potassium and need to be avoided if you have kidney disease  · Choose low-sodium foods at restaurants  Meals from restaurants are often high in sodium  Some restaurants have nutrition information on the menu that tells you the amount of sodium in their foods  If possible, ask for your food to be prepared with less, or no salt  · Shop for unsalted or low-sodium foods and snacks at the grocery store  Examples include unsalted or low-sodium broths, soups, and canned vegetables  Choose fresh or frozen vegetables instead  Choose unsalted nuts or seeds or fresh fruits or vegetables as snacks  Read food labels and choose salt-free, very low-sodium, or low-sodium foods  CARE AGREEMENT:   You have the right to help plan your care  Discuss treatment options with your healthcare provider to decide what care you want to receive  You always have the right to refuse treatment  The above information is an  only   It is not intended as medical advice for individual conditions or treatments  Talk to your doctor, nurse or pharmacist before following any medical regimen to see if it is safe and effective for you  © Copyright [employer] 2021 Information is for End User's use only and may not be sold, redistributed or otherwise used for commercial purposes   All illustrations and images included in CareNotes® are the copyrighted property of A [employer] A M , Inc  or [address] [place] [address]

## 2022-03-07 ENCOUNTER — OFFICE VISIT (OUTPATIENT)
Dept: FAMILY MEDICINE CLINIC | Facility: CLINIC | Age: 77
End: 2022-03-07
Payer: COMMERCIAL

## 2022-03-07 VITALS
SYSTOLIC BLOOD PRESSURE: 140 MMHG | HEIGHT: 69 IN | WEIGHT: 161 LBS | DIASTOLIC BLOOD PRESSURE: 90 MMHG | HEART RATE: 72 BPM | BODY MASS INDEX: 23.85 KG/M2 | RESPIRATION RATE: 16 BRPM

## 2022-03-07 DIAGNOSIS — I10 ESSENTIAL HYPERTENSION: Primary | ICD-10-CM

## 2022-03-07 PROCEDURE — 3077F SYST BP >= 140 MM HG: CPT | Performed by: NURSE PRACTITIONER

## 2022-03-07 PROCEDURE — 3080F DIAST BP >= 90 MM HG: CPT | Performed by: NURSE PRACTITIONER

## 2022-03-07 PROCEDURE — 99213 OFFICE O/P EST LOW 20 MIN: CPT | Performed by: NURSE PRACTITIONER

## 2022-03-07 PROCEDURE — 1036F TOBACCO NON-USER: CPT | Performed by: NURSE PRACTITIONER

## 2022-03-07 PROCEDURE — 1160F RVW MEDS BY RX/DR IN RCRD: CPT | Performed by: NURSE PRACTITIONER

## 2022-03-07 RX ORDER — LOSARTAN POTASSIUM 25 MG/1
25 TABLET ORAL DAILY
Qty: 180 TABLET | Refills: 3
Start: 2022-03-07 | End: 2022-06-13

## 2022-03-07 NOTE — PROGRESS NOTES
Assessment/Plan:     Diagnoses and all orders for this visit:    Essential hypertension  -     losartan (COZAAR) 25 mg tablet; Take 1 tablet (25 mg total) by mouth daily      Manual BP reading at 140/90 today  I would like to see patient BP readings <140/90  Patient may continue taking the Losartan 25 mg QD  Since he just decreased his Losartan dosing and more recently stopped the Carvedilol, we will have him RTO in 6 weeks to recheck BP  He is strongly encouraged to keep BP log, as this will help us identify if he is in his goal BP range the majority of readings at home  As always, Patient is encouraged to call our office for any questions/concerns, persistent or worsening symptoms  Patient states they understand and agree with treatment plan  Pt to RTO in 6 weeks for BP recheck or sooner PRN  Subjective:      Patient ID: Caron Barrios is a 64 y o  male  Patient presents for a BP recheck after changing his Lisinopril to Losartan back in January 2022  He was taken off of HCTZ r/t flank pain and hypercalcemia back in October 2021  Patient had Coreg 6 25 mg added on BID, in addition to his Lisinopril 10 mg  In December, his BP was still on the higher side so we increased Lisinopril from 10 mg to 20 mg  He did not have his BP log that visit but notes his numbers were primarily 150s/90s  Patient admitted to getting occipital headaches in the evening  He also noted in January that he read an article that states that Lisinopril is one of the top 5 worst BP medications to be on  He no longer wanted to be on Lisinopril  He notes his wife is on Losartan and she had even higher BPs than he did  He notes she is doing great and desired to switch from Lisinopril to Losartan  Patient was then started on Losartan 25 mg in January  In early February, he saw his new nephrologist Dr Melquiades Ward who has increased the Losartan to 50 mg for better BP control after his OV BP reading was 155/84    He notes that he started to feel really tired with taking both Losartan and Carvedilol and noted his BP was around 120/65  He discontinued the Carvedilol 2 weeks ago on his own accord as well as lowering his Losartan from 50 mg to 25 mg 3 days ago  He notes his BP is 135/70 at home  Patient adds in, that he recalls over the winter, storing his Lisinopril out in the garage and notes the pills froze and that is probably why they were not working in the first place  Today's initial reading was 130/70  I rechecked this manually at 140/90  The following portions of the patient's history were reviewed and updated as appropriate: allergies, current medications, past family history, past medical history, past social history, past surgical history and problem list     Review of Systems   Constitutional: Negative  HENT: Negative  Respiratory: Negative  Negative for cough  Cardiovascular: Negative  Gastrointestinal: Negative  Genitourinary: Negative  Musculoskeletal: Negative  Negative for myalgias  Neurological: Negative  Psychiatric/Behavioral: Negative  Objective:      /90   Pulse 72   Resp 16   Ht 5' 8 75" (1 746 m)   Wt 73 kg (161 lb)   BMI 23 95 kg/m²          Physical Exam  Vitals reviewed  Constitutional:       General: He is not in acute distress  Appearance: Normal appearance  He is not ill-appearing  Pulmonary:      Effort: Pulmonary effort is normal    Neurological:      Mental Status: He is alert and oriented to person, place, and time  Mental status is at baseline  Psychiatric:         Mood and Affect: Mood normal          Behavior: Behavior normal          Thought Content:  Thought content normal          Judgment: Judgment normal

## 2022-06-07 ENCOUNTER — TELEPHONE (OUTPATIENT)
Dept: NEPHROLOGY | Facility: CLINIC | Age: 77
End: 2022-06-07

## 2022-06-07 LAB
ALBUMIN SERPL-MCNC: 3.9 G/DL (ref 3.6–5.1)
ALBUMIN/GLOB SERPL: 1.7 (CALC) (ref 1–2.5)
ALP SERPL-CCNC: 70 U/L (ref 35–144)
ALT SERPL-CCNC: 15 U/L (ref 9–46)
APPEARANCE UR: CLEAR
AST SERPL-CCNC: 17 U/L (ref 10–35)
BACTERIA UR QL AUTO: NORMAL /HPF
BILIRUB SERPL-MCNC: 0.7 MG/DL (ref 0.2–1.2)
BILIRUB UR QL STRIP: NEGATIVE
BUN SERPL-MCNC: 19 MG/DL (ref 7–25)
BUN/CREAT SERPL: 15 (CALC) (ref 6–22)
CALCIUM SERPL-MCNC: 10 MG/DL (ref 8.6–10.3)
CHLORIDE SERPL-SCNC: 108 MMOL/L (ref 98–110)
CO2 SERPL-SCNC: 28 MMOL/L (ref 20–32)
COLOR UR: YELLOW
CREAT SERPL-MCNC: 1.31 MG/DL (ref 0.7–1.18)
CREAT UR-MCNC: 148 MG/DL (ref 20–320)
ERYTHROCYTE [DISTWIDTH] IN BLOOD BY AUTOMATED COUNT: 12.7 % (ref 11–15)
GLOBULIN SER CALC-MCNC: 2.3 G/DL (CALC) (ref 1.9–3.7)
GLUCOSE SERPL-MCNC: 85 MG/DL (ref 65–99)
GLUCOSE UR QL STRIP: NEGATIVE
HCT VFR BLD AUTO: 42.1 % (ref 38.5–50)
HGB BLD-MCNC: 14.4 G/DL (ref 13.2–17.1)
HGB UR QL STRIP: NEGATIVE
HYALINE CASTS #/AREA URNS LPF: NORMAL /LPF
KETONES UR QL STRIP: NEGATIVE
LEUKOCYTE ESTERASE UR QL STRIP: NEGATIVE
MAGNESIUM SERPL-MCNC: 2.3 MG/DL (ref 1.5–2.5)
MCH RBC QN AUTO: 30.8 PG (ref 27–33)
MCHC RBC AUTO-ENTMCNC: 34.2 G/DL (ref 32–36)
MCV RBC AUTO: 90.1 FL (ref 80–100)
NITRITE UR QL STRIP: NEGATIVE
PH UR STRIP: 6 [PH] (ref 5–8)
PHOSPHATE SERPL-MCNC: 2.6 MG/DL (ref 2.1–4.3)
PLATELET # BLD AUTO: 235 THOUSAND/UL (ref 140–400)
PMV BLD REES-ECKER: 10.4 FL (ref 7.5–12.5)
POTASSIUM SERPL-SCNC: 4.4 MMOL/L (ref 3.5–5.3)
PROT SERPL-MCNC: 6.2 G/DL (ref 6.1–8.1)
PROT UR QL STRIP: NEGATIVE
PROT UR-MCNC: 16 MG/DL (ref 5–25)
PROT/CREAT UR: 0.11 MG/MG CREAT (ref 0.02–0.13)
PROT/CREAT UR: 108 MG/G CREAT (ref 22–128)
PTH-INTACT SERPL-MCNC: 113 PG/ML (ref 16–77)
RBC # BLD AUTO: 4.67 MILLION/UL (ref 4.2–5.8)
RBC #/AREA URNS HPF: NORMAL /HPF
SL AMB EGFR AFRICAN AMERICAN: 60 ML/MIN/1.73M2
SL AMB EGFR NON AFRICAN AMERICAN: 52 ML/MIN/1.73M2
SODIUM SERPL-SCNC: 141 MMOL/L (ref 135–146)
SP GR UR STRIP: 1.02 (ref 1–1.03)
SQUAMOUS #/AREA URNS HPF: NORMAL /HPF
URATE SERPL-MCNC: 5.5 MG/DL (ref 4–8)
WBC # BLD AUTO: 6.5 THOUSAND/UL (ref 3.8–10.8)
WBC #/AREA URNS HPF: NORMAL /HPF

## 2022-06-07 NOTE — TELEPHONE ENCOUNTER
----- Message from Librado Alexandre DO sent at 6/7/2022  2:17 PM EDT -----  Overall stable labs    PTH was mildly elevated he can start vitamin D3 2000 units daily which can be discussed with him in further detail at his follow-up appointment

## 2022-06-07 NOTE — TELEPHONE ENCOUNTER
Left a voice message informing pt of stable lab results and recommendations Dr Dian Rg has   Requested a c/b to schedule Uziel's follow up appointment for August

## 2022-06-13 ENCOUNTER — OFFICE VISIT (OUTPATIENT)
Dept: FAMILY MEDICINE CLINIC | Facility: CLINIC | Age: 77
End: 2022-06-13
Payer: COMMERCIAL

## 2022-06-13 VITALS
WEIGHT: 157.2 LBS | DIASTOLIC BLOOD PRESSURE: 88 MMHG | OXYGEN SATURATION: 97 % | HEIGHT: 69 IN | SYSTOLIC BLOOD PRESSURE: 134 MMHG | HEART RATE: 60 BPM | RESPIRATION RATE: 17 BRPM | BODY MASS INDEX: 23.28 KG/M2

## 2022-06-13 DIAGNOSIS — I10 ESSENTIAL HYPERTENSION: ICD-10-CM

## 2022-06-13 DIAGNOSIS — R45.0 NERVOUSNESS: ICD-10-CM

## 2022-06-13 DIAGNOSIS — Z00.00 MEDICARE ANNUAL WELLNESS VISIT, SUBSEQUENT: Primary | ICD-10-CM

## 2022-06-13 DIAGNOSIS — Z12.11 SCREENING FOR COLON CANCER: ICD-10-CM

## 2022-06-13 DIAGNOSIS — E83.52 HYPERCALCEMIA: ICD-10-CM

## 2022-06-13 DIAGNOSIS — R79.89 CREATININE ELEVATION: ICD-10-CM

## 2022-06-13 DIAGNOSIS — N50.89 TESTICLE SWELLING: ICD-10-CM

## 2022-06-13 DIAGNOSIS — E55.9 VITAMIN D DEFICIENCY: ICD-10-CM

## 2022-06-13 DIAGNOSIS — N25.81 HYPERPARATHYROIDISM DUE TO RENAL INSUFFICIENCY (HCC): ICD-10-CM

## 2022-06-13 DIAGNOSIS — E04.1 NONTOXIC SINGLE THYROID NODULE: ICD-10-CM

## 2022-06-13 DIAGNOSIS — N18.31 CHRONIC KIDNEY DISEASE, STAGE 3A (HCC): ICD-10-CM

## 2022-06-13 PROCEDURE — 1101F PT FALLS ASSESS-DOCD LE1/YR: CPT | Performed by: FAMILY MEDICINE

## 2022-06-13 PROCEDURE — G0439 PPPS, SUBSEQ VISIT: HCPCS | Performed by: FAMILY MEDICINE

## 2022-06-13 PROCEDURE — 1125F AMNT PAIN NOTED PAIN PRSNT: CPT | Performed by: FAMILY MEDICINE

## 2022-06-13 PROCEDURE — 1036F TOBACCO NON-USER: CPT | Performed by: FAMILY MEDICINE

## 2022-06-13 PROCEDURE — 1170F FXNL STATUS ASSESSED: CPT | Performed by: FAMILY MEDICINE

## 2022-06-13 PROCEDURE — 3725F SCREEN DEPRESSION PERFORMED: CPT | Performed by: FAMILY MEDICINE

## 2022-06-13 PROCEDURE — 3075F SYST BP GE 130 - 139MM HG: CPT | Performed by: FAMILY MEDICINE

## 2022-06-13 PROCEDURE — 1160F RVW MEDS BY RX/DR IN RCRD: CPT | Performed by: FAMILY MEDICINE

## 2022-06-13 PROCEDURE — 3079F DIAST BP 80-89 MM HG: CPT | Performed by: FAMILY MEDICINE

## 2022-06-13 PROCEDURE — 3288F FALL RISK ASSESSMENT DOCD: CPT | Performed by: FAMILY MEDICINE

## 2022-06-13 PROCEDURE — 99215 OFFICE O/P EST HI 40 MIN: CPT | Performed by: FAMILY MEDICINE

## 2022-06-13 RX ORDER — LOSARTAN POTASSIUM 50 MG/1
50 TABLET ORAL 2 TIMES DAILY
Qty: 180 TABLET | Refills: 1 | Status: SHIPPED | OUTPATIENT
Start: 2022-06-13

## 2022-06-13 RX ORDER — ACETAMINOPHEN 160 MG
2000 TABLET,DISINTEGRATING ORAL DAILY
Refills: 0
Start: 2022-06-13

## 2022-06-13 NOTE — PATIENT INSTRUCTIONS
1  STOP SCREWING WITH YOUR MEDICINES! 2  Increase losartan to 50 mg and take twice a day  3  Keep Coreg 6 125 mg and take twice a day    4   buy vitamin D3 2000 units and take it once a day     5  Take your blood pressures once or twice a week either in the morning, mid day, or evening but take it at all of these times eventually     6  Get ultrasound of your testicles before next visit       In 5 weeks get nonfasting blood work  In 6 weeks all see you                      Medicare Preventive Visit Patient Instructions  Thank you for completing your Welcome to Medicare Visit or Medicare Annual Wellness Visit today  Your next wellness visit will be due in one year (6/14/2023)  The screening/preventive services that you may require over the next 5-10 years are detailed below  Some tests may not apply to you based off risk factors and/or age  Screening tests ordered at today's visit but not completed yet may show as past due  Also, please note that scanned in results may not display below  Preventive Screenings:  Service Recommendations Previous Testing/Comments   Colorectal Cancer Screening  Colonoscopy    Fecal Occult Blood Test (FOBT)/Fecal Immunochemical Test (FIT)  Fecal DNA/Cologuard Test  Flexible Sigmoidoscopy Age: 54-65 years old   Colonoscopy: every 10 years (May be performed more frequently if at higher risk)  OR  FOBT/FIT: every 1 year  OR  Cologuard: every 3 years  OR  Sigmoidoscopy: every 5 years  Screening may be recommended earlier than age 48 if at higher risk for colorectal cancer  Also, an individualized decision between you and your healthcare provider will decide whether screening between the ages of 74-80 would be appropriate   Colonoscopy: Not on file  FOBT/FIT: Not on file  Cologuard: Not on file  Sigmoidoscopy: Not on file          Prostate Cancer Screening Individualized decision between patient and health care provider in men between ages of 53-78   Medicare will cover every 12 months beginning on the day after your 50th birthday PSA: 1 7 ng/mL     Screening Not Indicated     Hepatitis C Screening Once for adults born between 1945 and 1965  More frequently in patients at high risk for Hepatitis C Hep C Antibody: Not on file    Screening Current   Diabetes Screening 1-2 times per year if you're at risk for diabetes or have pre-diabetes Fasting glucose: No results in last 5 years   A1C: No results in last 5 years    Screening Current   Cholesterol Screening Once every 5 years if you don't have a lipid disorder  May order more often based on risk factors  Lipid panel: Not on file           Other Preventive Screenings Covered by Medicare:  Abdominal Aortic Aneurysm (AAA) Screening: covered once if your at risk  You're considered to be at risk if you have a family history of AAA or a male between the age of 73-68 who smoking at least 100 cigarettes in your lifetime  Lung Cancer Screening: covers low dose CT scan once per year if you meet all of the following conditions: (1) Age 50-69; (2) No signs or symptoms of lung cancer; (3) Current smoker or have quit smoking within the last 15 years; (4) You have a tobacco smoking history of at least 30 pack years (packs per day x number of years you smoked); (5) You get a written order from a healthcare provider  Glaucoma Screening: covered annually if you're considered high risk: (1) You have diabetes OR (2) Family history of glaucoma OR (3)  aged 48 and older OR (3)  American aged 72 and older  Osteoporosis Screening: covered every 2 years if you meet one of the following conditions: (1) Have a vertebral abnormality; (2) On glucocorticoid therapy for more than 3 months; (3) Have primary hyperparathyroidism; (4) On osteoporosis medications and need to assess response to drug therapy  HIV Screening: covered annually if you're between the age of 12-76   Also covered annually if you are younger than 13 and older than 72 with risk factors for HIV infection  For pregnant patients, it is covered up to 3 times per pregnancy  Immunizations:  Immunization Recommendations   Influenza Vaccine Annual influenza vaccination during flu season is recommended for all persons aged >= 6 months who do not have contraindications   Pneumococcal Vaccine (Prevnar and Pneumovax)  * Prevnar = PCV13  * Pneumovax = PPSV23 Adults 25-60 years old: 1-3 doses may be recommended based on certain risk factors  Adults 72 years old: Prevnar (PCV13) vaccine recommended followed by Pneumovax (PPSV23) vaccine  If already received PPSV23 since turning 65, then PCV13 recommended at least one year after PPSV23 dose  Hepatitis B Vaccine 3 dose series if at intermediate or high risk (ex: diabetes, end stage renal disease, liver disease)   Tetanus (Td) Vaccine - COST NOT COVERED BY MEDICARE PART B Following completion of primary series, a booster dose should be given every 10 years to maintain immunity against tetanus  Td may also be given as tetanus wound prophylaxis  Tdap Vaccine - COST NOT COVERED BY MEDICARE PART B Recommended at least once for all adults  For pregnant patients, recommended with each pregnancy  Shingles Vaccine (Shingrix) - COST NOT COVERED BY MEDICARE PART B  2 shot series recommended in those aged 48 and above     Health Maintenance Due:      Topic Date Due    Hepatitis C Screening  11/29/2023 (Originally 1945)     Immunizations Due:      Topic Date Due    Pneumococcal Vaccine: 65+ Years (2 - PCV) 04/15/2011    COVID-19 Vaccine (3 - Booster for Moderna series) 07/20/2021    Influenza Vaccine (Season Ended) 09/01/2022     Advance Directives   What are advance directives? Advance directives are legal documents that state your wishes and plans for medical care  These plans are made ahead of time in case you lose your ability to make decisions for yourself   Advance directives can apply to any medical decision, such as the treatments you want, and if you want to donate organs  What are the types of advance directives? There are many types of advance directives, and each state has rules about how to use them  You may choose a combination of any of the following:  Living will: This is a written record of the treatment you want  You can also choose which treatments you do not want, which to limit, and which to stop at a certain time  This includes surgery, medicine, IV fluid, and tube feedings  Durable power of  for healthcare [facility]): This is a written record that states who you want to make healthcare choices for you when you are unable to make them for yourself  This person, called a proxy, is usually a family member or a friend  You may choose more than 1 proxy  Do not resuscitate (DNR) order:  A DNR order is used in case your heart stops beating or you stop breathing  It is a request not to have certain forms of treatment, such as CPR  A DNR order may be included in other types of advance directives  Medical directive: This covers the care that you want if you are in a coma, near death, or unable to make decisions for yourself  You can list the treatments you want for each condition  Treatment may include pain medicine, surgery, blood transfusions, dialysis, IV or tube feedings, and a ventilator (breathing machine)  Values history: This document has questions about your views, beliefs, and how you feel and think about life  This information can help others choose the care that you would choose  Why are advance directives important? An advance directive helps you control your care  Although spoken wishes may be used, it is better to have your wishes written down  Spoken wishes can be misunderstood, or not followed  Treatments may be given even if you do not want them  An advance directive may make it easier for your family to make difficult choices about your care        © Copyright [employer] 2018 Information is for End User's use only and may not be sold, redistributed or otherwise used for commercial purposes   All illustrations and images included in CareNotes® are the copyrighted property of A D A M , Inc  or Cleo Crespo

## 2022-06-13 NOTE — PROGRESS NOTES
Assessment and Plan:   Up to date  Problem List Items Addressed This Visit    None         Depression Screening and Follow-up Plan: Patient was screened for depression during today's encounter  They screened negative with a PHQ-2 score of 0  Preventive health issues were discussed with patient, and age appropriate screening tests were ordered as noted in patient's After Visit Summary  Personalized health advice and appropriate referrals for health education or preventive services given if needed, as noted in patient's After Visit Summary       History of Present Illness:     Patient presents for Medicare Annual Wellness visit    Patient Care Team:  Juarez Jacobson DO as PCP - General  Juarez Jacobson DO as PCP - PCP-[facility] Attributed-Roster     Problem List:     Patient Active Problem List   Diagnosis    Creatinine elevation    Essential hypertension    Nervousness    Nontoxic single thyroid nodule    Chronic kidney disease, stage 3a ([facility] [address] )    Vitamin D deficiency    Urinary stream slowing    Hypercalcemia      Past Medical and Surgical History:     Past Medical History:   Diagnosis Date    Abnormal weight loss     Last Assessed: 7/1/2013     Atrial fibrillation ([facility] [address] ) 10/03/2008    From Lyme Disease     Bell's palsy 10/03/2008    Due to Lyme Disease     Blurred vision     Last Assessed: 5/20/2014     Calculus of salivary gland     Last Assessed: 3/18/2014     Decreased body height 04/15/2010    Last Assessed: 2/10/2012     GERD (gastroesophageal reflux disease) 1990    Hypertension 05/20/2011    Last Assessed: 2/11/2013     Lyme disease 01/27/2012    Last Assessed: 2/10/2012     Lymphadenitis 05/20/2011    Tachycardia     Last Assessed: 2/18/2013     Testis mass 05/13/2010    Tick bites     Last Assessed: 4/15/2017      Past Surgical History:   Procedure Laterality Date    APPENDECTOMY  1955    NO PAST SURGERIES      US GUIDED THYROID BIOPSY  11/9/2021      Family History: Family History   Problem Relation Age of Onset    Dementia Mother     Uterine cancer Mother     Heart disease Father         Cardiac Disorder     Thyroid disease Father     Dementia Father     Drug abuse Brother     Substance Abuse Brother     Alcohol abuse Neg Hx       Social History:     Social History     Socioeconomic History    Marital status: /Civil Union     Spouse name: None    Number of children: None    Years of education: None    Highest education level: None   Occupational History    Occupation: Works with [employer]      Comment: Exposed to hazardous Substances    Tobacco Use    Smoking status: Former Smoker     Packs/day: 0 25     Years: 10 00     Pack years: 2 50     Types: Cigarettes    Smokeless tobacco: Never Used    Tobacco comment: smoked when he was a teenager    Vaping Use    Vaping Use: Never used   Substance and Sexual Activity    Alcohol use:  Yes     Alcohol/week: 1 0 standard drink     Types: 1 Glasses of wine per week     Comment: Drinks 2 beers a week     Drug use: No    Sexual activity: Yes     Birth control/protection: None   Other Topics Concern    None   Social History Narrative    Drinks Coffee- 1 cup a day     Has smoke detectors     Denied: History of Uses Safety Equipment- Seatbelts      Social Determinants of Health     Financial Resource Strain: Not on file   Food Insecurity: Not on file   Transportation Needs: Not on file   Physical Activity: Not on file   Stress: Not on file   Social Connections: Not on file   Intimate Partner Violence: Not on file   Housing Stability: Not on file      Medications and Allergies:     Current Outpatient Medications   Medication Sig Dispense Refill    APPLE CIDER VINEGAR PO Take 1 tablet by mouth in the morning      B Complex Vitamins (B Complex 50) TABS Take by mouth      losartan (COZAAR) 25 mg tablet Take 1 tablet (25 mg total) by mouth daily 180 tablet 3    Multiple Vitamins-Minerals (ONE DAILY MENS) TABS Take 1 tablet by mouth daily      Probiotic Product (PROBIOTIC DAILY PO) Take by mouth      TURMERIC PO Take 1 tablet by mouth in the morning      ZINC CHELATED PO Take by mouth       No current facility-administered medications for this visit  No Known Allergies   Immunizations:     Immunization History   Administered Date(s) Administered    COVID-19 MODERNA VACC 0 5 ML IM 01/25/2021, 02/20/2021    INFLUENZA 10/06/2016, 10/31/2017, 09/23/2020    Influenza Split High Dose Preservative Free IM 10/06/2016, 10/31/2017, 11/01/2019    Pneumococcal Polysaccharide PPV23 04/15/2010    Td (adult), adsorbed 04/15/2010      Health Maintenance:         Topic Date Due    Hepatitis C Screening  11/29/2023 (Originally 1945)         Topic Date Due    Pneumococcal Vaccine: 65+ Years (2 - PCV) 04/15/2011    COVID-19 Vaccine (3 - Booster for Moderna series) 07/20/2021    Influenza Vaccine (Season Ended) 09/01/2022      Medicare Health Risk Assessment:     /88   Pulse 60   Resp 17   Ht 5' 8 75" (1 746 m)   Wt 71 3 kg (157 lb 3 2 oz)   SpO2 97%   BMI 23 38 kg/m²      Chandni Reyes is here for his Subsequent Wellness visit  Health Risk Assessment:   Patient rates overall health as good  Patient feels that their physical health rating is same  Patient is satisfied with their life  Eyesight was rated as same  Hearing was rated as slightly worse  Patient feels that their emotional and mental health rating is same  Patients states they are never, rarely angry  Patient states they are always unusually tired/fatigued  Pain experienced in the last 7 days has been none  Patient states that he has experienced no weight loss or gain in last 6 months  Depression Screening:   PHQ-2 Score: 0      Fall Risk Screening: In the past year, patient has experienced: no history of falling in past year      Home Safety:  Patient does not have trouble with stairs inside or outside of their home   Patient has working smoke alarms and has working carbon monoxide detector  Home safety hazards include: none  Nutrition:   Current diet is Regular and Limited junk food  Medications:   Patient is currently taking over-the-counter supplements  OTC medications include: see medication list  Patient is able to manage medications  Activities of Daily Living (ADLs)/Instrumental Activities of Daily Living (IADLs):   Walk and transfer into and out of bed and chair?: Yes  Dress and groom yourself?: Yes    Bathe or shower yourself?: Yes    Feed yourself? Yes  Do your laundry/housekeeping?: Yes  Manage your money, pay your bills and track your expenses?: Yes  Make your own meals?: Yes    Do your own shopping?: Yes    Previous Hospitalizations:   Any hospitalizations or ED visits within the last 12 months?: No      Advance Care Planning:   Living will: Yes    Advanced directive: Yes    End of Life Decisions reviewed with patient: Yes      Cognitive Screening:   Provider or family/friend/caregiver concerned regarding cognition?: No    PREVENTIVE SCREENINGS      Cardiovascular Screening:    General: Screening Current      Diabetes Screening:     General: Screening Current      Colorectal Cancer Screening:       Due for: Cologuard      Prostate Cancer Screening:    General: Screening Not Indicated      Osteoporosis Screening:    General: Screening Not Indicated      Abdominal Aortic Aneurysm (AAA) Screening:    Risk factors include: tobacco use        General: Screening Not Indicated      Lung Cancer Screening:     General: Screening Not Indicated      Hepatitis C Screening:    General: Screening Current    Screening, Brief Intervention, and Referral to Treatment (SBIRT)    Screening  Typical number of drinks in a day: 1  Typical number of drinks in a week: 7  Interpretation: Low risk drinking behavior      Single Item Drug Screening:  How often have you used an illegal drug (including marijuana) or a prescription medication for non-medical reasons in the past year? never    Single Item Drug Screen Score: 0  Interpretation: Negative screen for possible drug use disorder    Brief Intervention  Alcohol & drug use screenings were reviewed  No concerns regarding substance use disorder identified  Other Counseling Topics:   Regular weightbearing exercise         Ling Ortega, DO

## 2022-06-13 NOTE — PROGRESS NOTES
ASSESSMENT/PLAN:    Hypertension  Increase losartan to 50 mg and take twice a day  Keep Coreg 6 125 twice daily  Recheck blood pressures in 6 weeks  Nonfasting blood work 1 week prior     BPH  Continue Flomax    Chronic kidney disease stage IIIA  Creatinine now 1 31 with a GFR 52 which is the best it has been awhile  We will increase losartan hoping that this makes a little a little better  We will keep the Coreg, both as above    Elevated calcium  Resolved with stopping HCTZ  Now PTH is elevated secondary to chronic kidney disease  It is going to start vitamin D3 2000 units daily    Thyroid nodules  2 thyroids biopsy sets  Both scan cells  Cells they do have are benign    Recheck in 6 weeks for blood pressure check  Nonfasting blood work 1 week prior                 Depression Screening and Follow-up Plan: Patient was screened for depression during today's encounter  They screened negative with a PHQ-2 score of 0             Health Maintenance   Topic Date Due    SLP PLAN OF CARE  Never done    Pneumococcal Vaccine: 65+ Years (2 - PCV) 04/15/2011    COVID-19 Vaccine (3 - Booster for Moderna series) 07/20/2021    Medicare Annual Wellness Visit (AWV)  04/22/2022    Influenza Vaccine (Season Ended) 09/01/2022    DTaP,Tdap,and Td Vaccines (1 - Tdap) 01/24/2023 (Originally 4/16/2010)    Hepatitis C Screening  11/29/2023 (Originally 1945)    Fall Risk  06/13/2023    Depression Screening  06/13/2023    BMI: Adult  06/13/2023    HIB Vaccine  Aged Out    Hepatitis B Vaccine  Aged Out    IPV Vaccine  Aged Out    Hepatitis A Vaccine  Aged Out    Meningococcal ACWY Vaccine  Aged Out    HPV Vaccine  Aged Out         Problem List as of 6/13/2022 Reviewed: 3/7/2022  8:25 AM by ERLIN Iglesias    Chronic kidney disease, stage 3a ([facility] [address] )    Creatinine elevation    Essential hypertension    Hypercalcemia    Nervousness    Nontoxic single thyroid nodule    Urinary stream slowing    Vitamin D deficiency Subjective:   Chief Complaint   Patient presents with   [facility] Wellness Visit     Patient is here today with his wife for blood pressure and regular recheck  Since last visit he was in to see Trang Gallegos with elevated blood pressure  He also saw Dr Adonay Bynum these notes were reviewed    He decided to decrease his losartan to 25 twice a day instead of 50 once a day  He is taking now the Coreg twice daily    Your again reviewed no blood pressures at home me a      patient ID: Eligio Yanez is a 64 y o  male  Patient's past medical history, surgical history, family history, social history, and Tobacco history reviewed with patient  MED LIST WAS REVIEWED AND UPDATED    ROS  As per HPI  Rest of 12 point review of systems negative     Objective:      VITALS:  Wt Readings from Last 3 Encounters:   06/13/22 71 3 kg (157 lb 3 2 oz)   03/07/22 73 kg (161 lb)   02/01/22 73 6 kg (162 lb 3 2 oz)     BP Readings from Last 3 Encounters:   06/13/22 134/88   03/07/22 140/90   02/01/22 155/84     Pulse Readings from Last 3 Encounters:   06/13/22 60   03/07/22 72   02/01/22 64     Body mass index is 23 38 kg/m²  Laboratory Results:    All pertinent labs and studies were reviewed with patient during this office visit with highlights of the results contained in this note in the ASSESSMENT AND PLAN section       Physical Exam    Constitutional  Appears healthy, Looks well, Appearance consistent with age    Mental Status  Alert, Oriented, Cooperative, Memory function normal , clean, and reasonable    Neck  No neck mass, No thyromegaly, Good carotid upstrokes bilaterally, trachea midline positive click    Respiratory  Breath sounds normal, No rales, No rhonchi, No wheezing, normal palpation    Cardiac   Regular rhythm without ectopy or murmur no S3-S4, no heave lift or thrill to palpation    Vascular  No leg edema, No pedal edema    Muscular skeletal  No clubbing cyanosis , muscle tone normal    Skin  No appreciable rashes or abnormal appearing lesions

## 2022-06-20 ENCOUNTER — TELEPHONE (OUTPATIENT)
Dept: NEPHROLOGY | Facility: CLINIC | Age: 77
End: 2022-06-20

## 2022-07-11 ENCOUNTER — TELEPHONE (OUTPATIENT)
Dept: FAMILY MEDICINE CLINIC | Facility: CLINIC | Age: 77
End: 2022-07-11

## 2022-07-11 DIAGNOSIS — I10 ESSENTIAL HYPERTENSION: ICD-10-CM

## 2022-07-11 RX ORDER — CARVEDILOL 6.25 MG/1
6.25 TABLET ORAL 2 TIMES DAILY WITH MEALS
Qty: 180 TABLET | Refills: 1 | Status: SHIPPED | OUTPATIENT
Start: 2022-07-11 | End: 2023-02-16

## 2022-07-11 NOTE — TELEPHONE ENCOUNTER
He should be on the carvedilol cording to my last note   And is twice daily   Prescription sent to [facility]

## 2022-07-11 NOTE — TELEPHONE ENCOUNTER
Pt called for a Carvedilol refill, I left a message with him that it was d/c'd in March of 22/  Should he be on it?      [phone]

## 2022-07-12 ENCOUNTER — RA CDI HCC (OUTPATIENT)
Dept: OTHER | Facility: HOSPITAL | Age: 77
End: 2022-07-12

## 2022-07-12 NOTE — PROGRESS NOTES
[facility]  coding opportunities       Chart reviewed, no opportunity found: CHART REVIEWED, NO OPPORTUNITY FOUND        Patients Insurance     Medicare Insurance: [employer] Advantage

## 2022-07-21 ENCOUNTER — APPOINTMENT (OUTPATIENT)
Dept: LAB | Facility: CLINIC | Age: 77
End: 2022-07-21
Payer: COMMERCIAL

## 2022-07-21 DIAGNOSIS — N18.31 CHRONIC KIDNEY DISEASE (CKD) STAGE G3A/A1, MODERATELY DECREASED GLOMERULAR FILTRATION RATE (GFR) BETWEEN 45-59 ML/MIN/1.73 SQUARE METER AND ALBUMINURIA CREATININE RATIO LESS THAN 30 MG/G (HCC): ICD-10-CM

## 2022-07-21 DIAGNOSIS — I10 ESSENTIAL HYPERTENSION, MALIGNANT: Primary | ICD-10-CM

## 2022-07-21 LAB
ANION GAP SERPL CALCULATED.3IONS-SCNC: 5 MMOL/L (ref 4–13)
BUN SERPL-MCNC: 26 MG/DL (ref 5–25)
CALCIUM SERPL-MCNC: 9.8 MG/DL (ref 8.3–10.1)
CHLORIDE SERPL-SCNC: 110 MMOL/L (ref 96–108)
CO2 SERPL-SCNC: 27 MMOL/L (ref 21–32)
CREAT SERPL-MCNC: 1.48 MG/DL (ref 0.6–1.3)
GFR SERPL CREATININE-BSD FRML MDRD: 44 ML/MIN/1.73SQ M
GLUCOSE P FAST SERPL-MCNC: 87 MG/DL (ref 65–99)
POTASSIUM SERPL-SCNC: 4.3 MMOL/L (ref 3.5–5.3)
SODIUM SERPL-SCNC: 142 MMOL/L (ref 135–147)

## 2022-07-21 PROCEDURE — 80048 BASIC METABOLIC PNL TOTAL CA: CPT

## 2022-07-21 PROCEDURE — 36415 COLL VENOUS BLD VENIPUNCTURE: CPT

## 2022-07-25 ENCOUNTER — OFFICE VISIT (OUTPATIENT)
Dept: FAMILY MEDICINE CLINIC | Facility: CLINIC | Age: 77
End: 2022-07-25
Payer: COMMERCIAL

## 2022-07-25 VITALS
BODY MASS INDEX: 23.43 KG/M2 | HEIGHT: 69 IN | RESPIRATION RATE: 15 BRPM | HEART RATE: 62 BPM | SYSTOLIC BLOOD PRESSURE: 124 MMHG | OXYGEN SATURATION: 98 % | WEIGHT: 158.2 LBS | DIASTOLIC BLOOD PRESSURE: 80 MMHG

## 2022-07-25 DIAGNOSIS — E04.1 NONTOXIC SINGLE THYROID NODULE: ICD-10-CM

## 2022-07-25 DIAGNOSIS — E55.9 VITAMIN D DEFICIENCY: ICD-10-CM

## 2022-07-25 DIAGNOSIS — E83.52 HYPERCALCEMIA: ICD-10-CM

## 2022-07-25 DIAGNOSIS — I10 ESSENTIAL HYPERTENSION: Primary | ICD-10-CM

## 2022-07-25 DIAGNOSIS — N18.31 CHRONIC KIDNEY DISEASE, STAGE 3A (HCC): ICD-10-CM

## 2022-07-25 DIAGNOSIS — R45.0 NERVOUSNESS: ICD-10-CM

## 2022-07-25 DIAGNOSIS — R79.89 CREATININE ELEVATION: ICD-10-CM

## 2022-07-25 PROCEDURE — 3079F DIAST BP 80-89 MM HG: CPT | Performed by: FAMILY MEDICINE

## 2022-07-25 PROCEDURE — 99214 OFFICE O/P EST MOD 30 MIN: CPT | Performed by: FAMILY MEDICINE

## 2022-07-25 PROCEDURE — 1160F RVW MEDS BY RX/DR IN RCRD: CPT | Performed by: FAMILY MEDICINE

## 2022-07-25 PROCEDURE — 93000 ELECTROCARDIOGRAM COMPLETE: CPT | Performed by: FAMILY MEDICINE

## 2022-07-25 PROCEDURE — 3074F SYST BP LT 130 MM HG: CPT | Performed by: FAMILY MEDICINE

## 2022-07-25 NOTE — PATIENT INSTRUCTIONS
Recheck in 6 months  Primarily to check the blood pressure and Nephrology to be doing the blood work    Patient to get new COVID vaccine if they come out with the Omnicron variant   Recheck sooner if needed

## 2022-07-25 NOTE — PROGRESS NOTES
ASSESSMENT/PLAN:    Hypertension  Continue losartan 50 mg twice a day  Keep Coreg twice a day  Blood pressure is excellent  EKG today    Chronic kidney disease stage IIIA  Follows with Nephrology as well  Creatinine is 1 31 with a GFR 52  Increased losartan had to go up a little at 1 point but then it came right back down to its base around 50    BPH  Continue Flomax    Elevated PTH  Patient has started vitamin-D  This should take care of this problem     Thyroid nodules  2 thyroids biopsies  Both scant cells  Cells they do have are benign    Recheck in 6 months  Primarily to check the blood pressure and Nephrology to be doing the blood work  Patient to get new COVID vaccine if they come out with the home a chronic variant your  Recheck sooner if needed                 Health Maintenance   Topic Date Due    SLP PLAN OF CARE  Never done    Pneumococcal Vaccine: 65+ Years (2 - PCV) 04/15/2011    COVID-19 Vaccine (3 - Booster for Moderna series) 07/20/2021    Influenza Vaccine (1) 09/01/2022    Hepatitis C Screening  11/29/2023 (Originally 1945)    Fall Risk  06/13/2023    Depression Screening  06/13/2023    Medicare Annual Wellness Visit (AWV)  06/13/2023    BMI: Adult  06/13/2023    HIB Vaccine  Aged Out    Hepatitis B Vaccine  Aged Out    IPV Vaccine  Aged Out    Hepatitis A Vaccine  Aged Out    Meningococcal ACWY Vaccine  Aged Out    HPV Vaccine  Aged Out         Problem List as of 7/25/2022 Reviewed: 6/13/2022 10:05 AM by Renita Garcia DO    Chronic kidney disease, stage 3a ([facility] [address] )    Creatinine elevation    Essential hypertension    Hypercalcemia    Nervousness    Nontoxic single thyroid nodule    Urinary stream slowing    Vitamin D deficiency            Subjective:   Chief Complaint   Patient presents with    Hypertension    Vitamin D Deficiency     Patient is here to recheck on his blood pressure after raising his Arb in order to get good control    He also saw his kidney doctor who did blood work and found that everything appears to be stable  He does have an ultrasound of his kidney in his testicles already scheduled for the near future    Patient is eating out of his garden him busy keeping things in check there  He is feeling very well as well       patient ID: Kaden Suazo is a 64 y o  male  Patient's past medical history, surgical history, family history, social history, and Tobacco history reviewed with patient  MED LIST WAS REVIEWED AND UPDATED    ROS  As per HPI  Rest of 12 point review of systems negative     Objective:      VITALS:  Wt Readings from Last 3 Encounters:   07/25/22 71 8 kg (158 lb 3 2 oz)   06/13/22 71 3 kg (157 lb 3 2 oz)   03/07/22 73 kg (161 lb)     BP Readings from Last 3 Encounters:   07/25/22 124/80   06/13/22 134/88   03/07/22 140/90     Pulse Readings from Last 3 Encounters:   07/25/22 62   06/13/22 60   03/07/22 72     Body mass index is 23 7 kg/m²  Laboratory Results:    All pertinent labs and studies were reviewed with patient during this office visit with highlights of the results contained in this note in the ASSESSMENT AND PLAN section       Physical Exam    Constitutional  Appears healthy, Looks well, Appearance consistent with age    Mental Status  Alert, Oriented, Cooperative, Memory function normal , clean, and reasonable    Neck  No neck mass, No thyromegaly, Good carotid upstrokes bilaterally, trachea midline positive click    Respiratory  Breath sounds normal, No rales, No rhonchi, No wheezing, normal palpation    Cardiac   Regular rhythm without ectopy or murmur no S3-S4, no heave lift or thrill to palpation    Vascular  No leg edema, No pedal edema    Muscular skeletal  No clubbing cyanosis , muscle tone normal    Skin  No appreciable rashes or abnormal appearing lesions

## 2022-07-27 ENCOUNTER — HOSPITAL ENCOUNTER (OUTPATIENT)
Dept: ULTRASOUND IMAGING | Facility: HOSPITAL | Age: 77
Discharge: HOME | End: 2022-07-27
Attending: INTERNAL MEDICINE
Payer: COMMERCIAL

## 2022-07-27 ENCOUNTER — HOSPITAL ENCOUNTER (OUTPATIENT)
Dept: ULTRASOUND IMAGING | Facility: HOSPITAL | Age: 77
Discharge: HOME | End: 2022-07-27
Payer: COMMERCIAL

## 2022-07-27 DIAGNOSIS — I10 ESSENTIAL HYPERTENSION: ICD-10-CM

## 2022-07-27 DIAGNOSIS — N18.31 STAGE 3A CHRONIC KIDNEY DISEASE (HCC): ICD-10-CM

## 2022-07-27 DIAGNOSIS — N50.89 TESTICLE SWELLING: ICD-10-CM

## 2022-07-27 PROCEDURE — 76770 US EXAM ABDO BACK WALL COMP: CPT

## 2022-07-27 PROCEDURE — 76870 US EXAM SCROTUM: CPT

## 2022-08-02 NOTE — RESULT ENCOUNTER NOTE
Call patient regarding his scrotal ultrasound   Everything is normal and he has multiple cyst in his epididymis on both sides that can make his testicle seemed bigger than it is  There is nothing to do regarding these cyst     They are benign

## 2022-08-03 ENCOUNTER — TELEPHONE (OUTPATIENT)
Dept: FAMILY MEDICINE CLINIC | Facility: CLINIC | Age: 77
End: 2022-08-03

## 2022-08-03 NOTE — TELEPHONE ENCOUNTER
----- Message from Eliz Billings DO sent at 8/2/2022  5:10 PM EDT -----  Call patient regarding his scrotal ultrasound   Everything is normal and he has multiple cyst in his epididymis on both sides that can make his testicle seemed bigger than it is  There is nothing to do regarding these cyst     They are benign  Left message to call back

## 2022-09-12 ENCOUNTER — TELEPHONE (OUTPATIENT)
Dept: NEPHROLOGY | Facility: CLINIC | Age: 77
End: 2022-09-12

## 2022-09-12 ENCOUNTER — OFFICE VISIT (OUTPATIENT)
Dept: NEPHROLOGY | Facility: HOSPITAL | Age: 77
End: 2022-09-12
Payer: COMMERCIAL

## 2022-09-12 VITALS
BODY MASS INDEX: 24.44 KG/M2 | HEIGHT: 69 IN | DIASTOLIC BLOOD PRESSURE: 100 MMHG | SYSTOLIC BLOOD PRESSURE: 160 MMHG | WEIGHT: 165 LBS

## 2022-09-12 DIAGNOSIS — I10 ESSENTIAL HYPERTENSION: ICD-10-CM

## 2022-09-12 DIAGNOSIS — L72.9 SCROTAL CYST: Primary | ICD-10-CM

## 2022-09-12 DIAGNOSIS — N18.31 CHRONIC KIDNEY DISEASE, STAGE 3A (HCC): ICD-10-CM

## 2022-09-12 PROCEDURE — 3077F SYST BP >= 140 MM HG: CPT | Performed by: INTERNAL MEDICINE

## 2022-09-12 PROCEDURE — 1160F RVW MEDS BY RX/DR IN RCRD: CPT | Performed by: INTERNAL MEDICINE

## 2022-09-12 PROCEDURE — 3080F DIAST BP >= 90 MM HG: CPT | Performed by: INTERNAL MEDICINE

## 2022-09-12 PROCEDURE — 99214 OFFICE O/P EST MOD 30 MIN: CPT | Performed by: INTERNAL MEDICINE

## 2022-09-12 RX ORDER — ASCORBATE CALCIUM 500 MG
500 TABLET ORAL DAILY
COMMUNITY

## 2022-09-12 RX ORDER — LOSARTAN POTASSIUM 50 MG/1
50 TABLET ORAL 2 TIMES DAILY
Qty: 180 TABLET | Refills: 1 | Status: SHIPPED | OUTPATIENT
Start: 2022-09-12

## 2022-09-12 NOTE — PATIENT INSTRUCTIONS
Low-Sodium Diet   WHAT YOU NEED TO KNOW:   What is a low-sodium diet? A low-sodium diet limits foods that are high in sodium (salt)  You will need to follow a low-sodium diet if you have high blood pressure, kidney disease, or heart failure  You may also need to follow this diet if you have a condition that is causing your body to retain (hold) extra fluid  You may need to limit the amount of sodium you eat in a day to 1,500 to 2,000 mg  Ask your healthcare provider how much sodium you can have each day  How can I use food labels to choose foods that are low in sodium? Read food labels to find the amount of sodium they contain  The amount of sodium is listed in milligrams (mg)  The % Daily Value (DV) column tells you how much of your daily needs are met by 1 serving of the food for each nutrient listed  Choose foods that have less than 5% of the DV of sodium  These foods are considered low in sodium  Foods that have 20% or more of the DV of sodium are considered high in sodium  Some food labels may also list any of the following terms that tell you about the sodium content in the food:  Sodium-free:  Less than 5 mg in each serving    Very low sodium:  35 mg of sodium or less in each serving    Low sodium:  140 mg of sodium or less in each serving    Reduced sodium: At least 25% less sodium in each serving than the regular type    Light in sodium:  50% less sodium in each serving    Unsalted or no added salt:  No extra salt is added during processing (the food may still contain sodium)       Which foods should I avoid? Salty foods are high in sodium   You should avoid the following:  Processed foods:      Mixes for cornbread, biscuits, cake, and pudding     Instant foods, such as potatoes, cereals, noodles, and rice     Packaged foods, such as bread stuffing, rice and pasta mixes, snack dip mixes, and macaroni and cheese     Canned foods, such as canned vegetables, soups, broths, sauces, and vegetable or tomato juice    Snack foods, such as salted chips, popcorn, pretzels, pork rinds, salted crackers, and salted nuts    Frozen foods, such as dinners, entrees, vegetables with sauces, and breaded meats    Sauerkraut, pickled vegetables, and other foods prepared in brine    Meats and cheeses:      Smoked or cured meat, such as corned beef, osborn, ham, hot dogs, and sausage    Canned meats or spreads, such as potted meats, sardines, anchovies, and imitation seafood    Deli or lunch meats, such as bologna, ham, turkey, and roast beef    Processed cheese, such as American cheese and cheese spreads    Condiments, sauces, and seasonings:      Salt (¼ teaspoon of salt contains 575 mg of sodium)    Seasonings made with salt, such as garlic salt, celery salt, onion salt, and seasoned salt    Regular soy sauce, barbecue sauce, teriyaki sauce, steak sauce, Worcestershire sauce, and most flavored vinegars    Canned gravy and mixes     Regular condiments, such as mustard, ketchup, and salad dressings    Pickles and olives    Meat tenderizers and monosodium glutamate (MSG)    Which foods can I include? Read the food label to find the amount of sodium in each serving  Bread and cereal:  Try to choose breads with less than 80 mg of sodium per serving  Bread, roll, igor, tortilla, or unsalted crackers  Ready-to-eat cereals with less than 5% DV of sodium (examples include shredded wheat and puffed rice)    Pasta    Vegetables and fruits:      Unsalted fresh, frozen, or canned vegetables    Fresh, frozen, or canned fruits    Fruit juice    Dairy:  One serving has about 150 mg of sodium  Milk, all types    Yogurt    Hard cheese, such as cheddar, Swiss, [employer], or [employer] and other protein foods:  Some raw meats may have added sodium       Plain meats, fish, and poultry     Egg    Other foods:      Homemade pudding    Unsalted nuts, popcorn, or pretzels    Unsalted butter or margarine    What are some ways that I can decrease sodium? Add spices and herbs to foods instead of salt during cooking  Use salt-free seasonings to add flavor to foods  Examples include onion powder, garlic powder, basil, matson powder, paprika, and parsley  Try lemon or lime juice or vinegar to give foods a tart flavor  Use hot peppers, pepper, or cayenne pepper to add a spicy flavor  Do not keep a salt shaker at your kitchen table  This may help keep you from adding salt to food at the table  A teaspoon of salt has 2,300 mg of sodium  It may take time to get used to enjoying the natural flavor of food instead of adding salt  Talk to your healthcare provider before you use salt substitutes  Some salt substitutes have a high amount of potassium and need to be avoided if you have kidney disease  Choose low-sodium foods at restaurants  Meals from restaurants are often high in sodium  Some restaurants have nutrition information on the menu that tells you the amount of sodium in their foods  If possible, ask for your food to be prepared with less, or no salt  Shop for unsalted or low-sodium foods and snacks at the grocery store  Examples include unsalted or low-sodium broths, soups, and canned vegetables  Choose fresh or frozen vegetables instead  Choose unsalted nuts or seeds or fresh fruits or vegetables as snacks  Read food labels and choose salt-free, very low-sodium, or low-sodium foods  CARE AGREEMENT:   You have the right to help plan your care  Discuss treatment options with your healthcare provider to decide what care you want to receive  You always have the right to refuse treatment  The above information is an  only  It is not intended as medical advice for individual conditions or treatments  Talk to your doctor, nurse or pharmacist before following any medical regimen to see if it is safe and effective for you    © Copyright [employer] 2022 Information is for End User's use only and may not be sold, redistributed or otherwise used for commercial purposes   All illustrations and images included in CareNotes® are the copyrighted property of [employer] M [employer] Inc  or [facility]resp[facility]

## 2022-09-12 NOTE — PROGRESS NOTES
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 with 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  o Has some mild echogenicity and a few cysts  o Will repeat CMP, magnesium, phosphorus, check a urine protein to creatinine ratio, check a urinalysis  o Discussed with the patient chances of progression are low  o Will monitor for anemia and bone mineral disease  o Continue healthy diet    2  Hypertension-uncontrolled  o he was on lisinopril hydrochlorothiazide was becoming hypercalcemic so this was discontinued  o Placed on carvedilol 6 25 mg 2 times daily and losartan 50 mg daily his blood pressures are elevated today in the office and have been he states they are better controlled when he was taking it twice daily so I have asked him to up titrate his losartan to 50 mg twice daily    3  Hypercalcemia  o This has improved since being off his thiazide diuretic will monitor    5  Risk reduction/clinical course  o Continue blood pressure control  o Continue diet and exercise    6  Scrotal spermatoceles, but did him Will head cysts with a dominant cystic lesion of the left  He states these are uncomfortable for him when he walks and crosses his legs will have him evaluated by Urology to see if there are any additional treatment options versus observation      Satya Ramon is doing well  His medications are appropriately dosed his blood pressures are high so will up titrate his losartan 50 mg twice daily  He was in agreement with this he had no further questions his creatinine is stable his labs reviewed with him    He had no further questions will follow-up in 1 year      HPI:  Khalida Zamudio is a 64 y o male who was referred by Gopal Stearns DO for evaluation of Follow-up and Chronic Kidney Disease    Here for evaluation he is a 68-year-old male with a past medical history of hypertension, nontoxic single thyroid nodule he has been doing relatively well he denies any acute chest pain or shortness of breath no fevers or chills he has been tolerating his medications without difficulty  He has had a good urinary stream although at 1 point there was some concern for some decreased urinary flow he was prescribed Flomax but this was discontinued  Early denies any acute complaints no chest pain or shortness of breath fevers or chills nausea vomiting diarrhea constipation foamy or bloody urine    ROS:    Review of Systems   Constitutional: Negative  HENT: Negative  Eyes: Negative  Respiratory: Negative  Cardiovascular: Negative  Gastrointestinal: Negative  Endocrine: Negative  Genitourinary: Negative  Musculoskeletal: Negative  Allergic/Immunologic: Negative  Neurological: Negative  Hematological: Negative  Psychiatric/Behavioral: Negative  All other systems reviewed and are negative  Allergies: Patient has no known allergies      Medications:   Current Outpatient Medications:     Calcium Ascorbate (VITAMIN C) 500 mg tablet, Take 500 mg by mouth daily, Disp: , Rfl:     carvedilol (COREG) 6 25 mg tablet, Take 1 tablet (6 25 mg total) by mouth 2 (two) times a day with meals, Disp: 180 tablet, Rfl: 1    Cholecalciferol (Vitamin D3) 50 MCG (2000 UT) capsule, Take 1 capsule (2,000 Units total) by mouth daily, Disp: , Rfl: 0    Coenzyme Q10 (Co Q 10) 100 MG CAPS, Take 200 mg by mouth in the morning, Disp: , Rfl:     losartan (COZAAR) 50 mg tablet, Take 1 tablet (50 mg total) by mouth 2 (two) times a day, Disp: 180 tablet, Rfl: 1    Multiple Vitamins-Minerals (ONE DAILY MENS) TABS, Take 1 tablet by mouth daily, Disp: , Rfl:     Probiotic Product (PROBIOTIC DAILY PO), Take by mouth, Disp: , Rfl:     APPLE CIDER VINEGAR PO, Take 1 tablet by mouth in the morning (Patient not taking: Reported on 9/12/2022), Disp: , Rfl:     TURMERIC PO, Take 1 tablet by mouth in the morning (Patient not taking: Reported on 9/12/2022), Disp: , Rfl:     ZINC CHELATED PO, Take by mouth (Patient not taking: Reported on 9/12/2022), Disp: , Rfl:     Past Medical History:   Diagnosis Date    Abnormal weight loss     Last Assessed: 7/1/2013     Atrial fibrillation ([address] ) 10/03/2008    From Lyme Disease     Bell's palsy 10/03/2008    Due to Lyme Disease     Blurred vision     Last Assessed: 5/20/2014     Calculus of salivary gland     Last Assessed: 3/18/2014     Decreased body height 04/15/2010    Last Assessed: 2/10/2012     GERD (gastroesophageal reflux disease) 1990    Hypertension 05/20/2011    Last Assessed: 2/11/2013     Lyme disease 01/27/2012    Last Assessed: 2/10/2012     Lymphadenitis 05/20/2011    Tachycardia     Last Assessed: 2/18/2013     Testis mass 05/13/2010    Tick bites     Last Assessed: 4/15/2017      Past Surgical History:   Procedure Laterality Date    APPENDECTOMY  1957    NO PAST SURGERIES      US GUIDED THYROID BIOPSY  11/9/2021     Family History   Problem Relation Age of Onset    Dementia Mother     Uterine cancer Mother     Heart disease Father         Cardiac Disorder     Thyroid disease Father     Dementia Father     Drug abuse Brother     Substance Abuse Brother     Alcohol abuse Neg Hx       reports that he has quit smoking  His smoking use included cigarettes  He has a 2 50 pack-year smoking history  He has never used smokeless tobacco  He reports current alcohol use of about 1 0 standard drink of alcohol per week  He reports that he does not use drugs        OBJECTIVE:  /100 (BP Location: Left arm, Patient Position: Sitting, Cuff Size: Adult)   Ht 5' 8 5" (1 74 m)   Wt 74 8 kg (165 lb)   BMI 24 72 kg/m²  Body mass index is 24 72 kg/m²  Physical exam:  Physical Exam  Vitals and nursing note reviewed  Constitutional:       Appearance: Normal appearance  HENT:      Head: Normocephalic and atraumatic  Nose: Nose normal       Mouth/Throat:      Mouth: Mucous membranes are moist       Pharynx: Oropharynx is clear  Eyes:      Extraocular Movements: Extraocular movements intact  Conjunctiva/sclera: Conjunctivae normal    Cardiovascular:      Rate and Rhythm: Normal rate and regular rhythm  Pulmonary:      Effort: Pulmonary effort is normal  No respiratory distress  Breath sounds: Normal breath sounds  Abdominal:      General: There is no distension  Musculoskeletal:         General: Normal range of motion  Cervical back: Normal range of motion  Skin:     General: Skin is warm and dry  Neurological:      General: No focal deficit present  Mental Status: He is alert and oriented to person, place, and time  Psychiatric:         Mood and Affect: Mood normal          Behavior: Behavior normal          Thought Content: Thought content normal          Judgment: Judgment normal            Lab Results:   No results found for this or any previous visit  Invalid input(s): ALBUMIN      DISCUSSION:    Patient Instructions     Low-Sodium Diet   WHAT YOU NEED TO KNOW:   What is a low-sodium diet? A low-sodium diet limits foods that are high in sodium (salt)  You will need to follow a low-sodium diet if you have high blood pressure, kidney disease, or heart failure  You may also need to follow this diet if you have a condition that is causing your body to retain (hold) extra fluid  You may need to limit the amount of sodium you eat in a day to 1,500 to 2,000 mg  Ask your healthcare provider how much sodium you can have each day  How can I use food labels to choose foods that are low in sodium? Read food labels to find the amount of sodium they contain   The amount of sodium is listed in milligrams (mg)  The % Daily Value (DV) column tells you how much of your daily needs are met by 1 serving of the food for each nutrient listed  Choose foods that have less than 5% of the DV of sodium  These foods are considered low in sodium  Foods that have 20% or more of the DV of sodium are considered high in sodium  Some food labels may also list any of the following terms that tell you about the sodium content in the food:  · Sodium-free:  Less than 5 mg in each serving    · Very low sodium:  35 mg of sodium or less in each serving    · Low sodium:  140 mg of sodium or less in each serving    · Reduced sodium: At least 25% less sodium in each serving than the regular type    · Light in sodium:  50% less sodium in each serving    · Unsalted or no added salt:  No extra salt is added during processing (the food may still contain sodium)       Which foods should I avoid? Salty foods are high in sodium  You should avoid the followin  Processed foods:      ? Mixes for cornbread, biscuits, cake, and pudding     ? Instant foods, such as potatoes, cereals, noodles, and rice     ? Packaged foods, such as bread stuffing, rice and pasta mixes, snack dip mixes, and macaroni and cheese     ? Canned foods, such as canned vegetables, soups, broths, sauces, and vegetable or tomato juice    ? Snack foods, such as salted chips, popcorn, pretzels, pork rinds, salted crackers, and salted nuts    ? Frozen foods, such as dinners, entrees, vegetables with sauces, and breaded meats    ? Sauerkraut, pickled vegetables, and other foods prepared in brine    2  Meats and cheeses:      ? Smoked or cured meat, such as corned beef, osborn, ham, hot dogs, and sausage    ? Canned meats or spreads, such as potted meats, sardines, anchovies, and imitation seafood    ? Deli or lunch meats, such as bologna, ham, turkey, and roast beef    ?  Processed cheese, such as American cheese and cheese spreads    3  Condiments, sauces, and seasonings:      ? Salt (¼ teaspoon of salt contains 575 mg of sodium)    ? Seasonings made with salt, such as garlic salt, celery salt, onion salt, and seasoned salt    ? Regular soy sauce, barbecue sauce, teriyaki sauce, steak sauce, Worcestershire sauce, and most flavored vinegars    ? Canned gravy and mixes     ? Regular condiments, such as mustard, ketchup, and salad dressings    ? Pickles and olives    ? Meat tenderizers and monosodium glutamate (MSG)    Which foods can I include? Read the food label to find the amount of sodium in each serving  1  Bread and cereal:  Try to choose breads with less than 80 mg of sodium per serving  ? Bread, roll, igor, tortilla, or unsalted crackers  ? Ready-to-eat cereals with less than 5% DV of sodium (examples include shredded wheat and puffed rice)    ? Pasta    2  Vegetables and fruits:      ? Unsalted fresh, frozen, or canned vegetables    ? Fresh, frozen, or canned fruits    ? Fruit juice    3  Dairy:  One serving has about 150 mg of sodium  ? Milk, all types    ? Yogurt    ? Hard cheese, such as cheddar, Swiss, [employer], or mozzarella    4  Meat and other protein foods:  Some raw meats may have added sodium  ? Plain meats, fish, and poultry     ? Egg    5  Other foods:      ? Homemade pudding    ? Unsalted nuts, popcorn, or pretzels    ? Unsalted butter or margarine    What are some ways that I can decrease sodium? · Add spices and herbs to foods instead of salt during cooking  Use salt-free seasonings to add flavor to foods  Examples include onion powder, garlic powder, basil, matson powder, paprika, and parsley  Try lemon or lime juice or vinegar to give foods a tart flavor  Use hot peppers, pepper, or cayenne pepper to add a spicy flavor  · Do not keep a salt shaker at your kitchen table  This may help keep you from adding salt to food at the table  A teaspoon of salt has 2,300 mg of sodium   It may take time to get used to enjoying the natural flavor of food instead of adding salt  Talk to your healthcare provider before you use salt substitutes  Some salt substitutes have a high amount of potassium and need to be avoided if you have kidney disease  · Choose low-sodium foods at restaurants  Meals from restaurants are often high in sodium  Some restaurants have nutrition information on the menu that tells you the amount of sodium in their foods  If possible, ask for your food to be prepared with less, or no salt  · Shop for unsalted or low-sodium foods and snacks at the grocery store  Examples include unsalted or low-sodium broths, soups, and canned vegetables  Choose fresh or frozen vegetables instead  Choose unsalted nuts or seeds or fresh fruits or vegetables as snacks  Read food labels and choose salt-free, very low-sodium, or low-sodium foods  CARE AGREEMENT:   You have the right to help plan your care  Discuss treatment options with your healthcare provider to decide what care you want to receive  You always have the right to refuse treatment  The above information is an  only  It is not intended as medical advice for individual conditions or treatments  Talk to your doctor, nurse or pharmacist before following any medical regimen to see if it is safe and effective for you  © Copyright [employer] 2022 Information is for End User's use only and may not be sold, redistributed or otherwise used for commercial purposes  All illustrations and images included in CareNotes® are the copyrighted property of [employer]  or Cleo [employer]         Portions of the record may have been created with voice recognition software  Occasional wrong word or "sound a like" substitutions may have occurred due to the inherent limitations of voice recognition software  Read the chart carefully and recognize, using context, where substitutions have occurred  If you have any questions, please contact the dictating provider

## 2022-09-28 ENCOUNTER — VBI (OUTPATIENT)
Dept: ADMINISTRATIVE | Facility: OTHER | Age: 77
End: 2022-09-28

## 2022-11-10 ENCOUNTER — VBI (OUTPATIENT)
Dept: ADMINISTRATIVE | Facility: OTHER | Age: 77
End: 2022-11-10

## 2022-12-14 ENCOUNTER — TELEPHONE (OUTPATIENT)
Dept: FAMILY MEDICINE CLINIC | Facility: CLINIC | Age: 77
End: 2022-12-14

## 2022-12-14 ENCOUNTER — HOSPITAL ENCOUNTER (EMERGENCY)
Facility: HOSPITAL | Age: 77
Discharge: HOME | End: 2022-12-14
Attending: EMERGENCY MEDICINE

## 2022-12-14 VITALS
WEIGHT: 160 LBS | DIASTOLIC BLOOD PRESSURE: 87 MMHG | HEART RATE: 59 BPM | BODY MASS INDEX: 23.7 KG/M2 | OXYGEN SATURATION: 97 % | TEMPERATURE: 98.2 F | RESPIRATION RATE: 18 BRPM | SYSTOLIC BLOOD PRESSURE: 142 MMHG | HEIGHT: 69 IN

## 2022-12-14 DIAGNOSIS — F41.9 ANXIETY: ICD-10-CM

## 2022-12-14 DIAGNOSIS — I10 ASYMPTOMATIC HYPERTENSION: Primary | ICD-10-CM

## 2022-12-14 LAB
ALBUMIN SERPL BCP-MCNC: 3.8 G/DL (ref 3.5–5)
ALP SERPL-CCNC: 84 U/L (ref 46–116)
ALT SERPL W P-5'-P-CCNC: 24 U/L (ref 12–78)
ANION GAP SERPL CALCULATED.3IONS-SCNC: 9 MMOL/L (ref 4–13)
AST SERPL W P-5'-P-CCNC: 20 U/L (ref 5–45)
BASOPHILS # BLD AUTO: 0.06 THOUSANDS/ÂΜL (ref 0–0.1)
BASOPHILS NFR BLD AUTO: 1 % (ref 0–1)
BILIRUB SERPL-MCNC: 0.5 MG/DL (ref 0.2–1)
BILIRUB UR QL STRIP: NEGATIVE
BUN SERPL-MCNC: 23 MG/DL (ref 5–25)
CALCIUM SERPL-MCNC: 9.9 MG/DL (ref 8.3–10.1)
CHLORIDE SERPL-SCNC: 107 MMOL/L (ref 96–108)
CLARITY UR: CLEAR
CO2 SERPL-SCNC: 25 MMOL/L (ref 21–32)
COLOR UR: ABNORMAL
CREAT SERPL-MCNC: 1.27 MG/DL (ref 0.6–1.3)
EOSINOPHIL # BLD AUTO: 0.18 THOUSAND/ÂΜL (ref 0–0.61)
EOSINOPHIL NFR BLD AUTO: 2 % (ref 0–6)
ERYTHROCYTE [DISTWIDTH] IN BLOOD BY AUTOMATED COUNT: 13 % (ref 11.6–15.1)
GFR SERPL CREATININE-BSD FRML MDRD: 53 ML/MIN/1.73SQ M
GLUCOSE SERPL-MCNC: 93 MG/DL (ref 65–140)
GLUCOSE UR STRIP-MCNC: NEGATIVE MG/DL
HCT VFR BLD AUTO: 45.5 % (ref 36.5–49.3)
HGB BLD-MCNC: 15.1 G/DL (ref 12–17)
HGB UR QL STRIP.AUTO: NEGATIVE
IMM GRANULOCYTES # BLD AUTO: 0.03 THOUSAND/UL (ref 0–0.2)
IMM GRANULOCYTES NFR BLD AUTO: 0 % (ref 0–2)
KETONES UR STRIP-MCNC: ABNORMAL MG/DL
LEUKOCYTE ESTERASE UR QL STRIP: NEGATIVE
LYMPHOCYTES # BLD AUTO: 2.43 THOUSANDS/ÂΜL (ref 0.6–4.47)
LYMPHOCYTES NFR BLD AUTO: 26 % (ref 14–44)
MCH RBC QN AUTO: 30.2 PG (ref 26.8–34.3)
MCHC RBC AUTO-ENTMCNC: 33.2 G/DL (ref 31.4–37.4)
MCV RBC AUTO: 91 FL (ref 82–98)
MONOCYTES # BLD AUTO: 0.78 THOUSAND/ÂΜL (ref 0.17–1.22)
MONOCYTES NFR BLD AUTO: 8 % (ref 4–12)
NEUTROPHILS # BLD AUTO: 5.79 THOUSANDS/ÂΜL (ref 1.85–7.62)
NEUTS SEG NFR BLD AUTO: 63 % (ref 43–75)
NITRITE UR QL STRIP: NEGATIVE
NRBC BLD AUTO-RTO: 0 /100 WBCS
PH UR STRIP.AUTO: 6 [PH]
PLATELET # BLD AUTO: 242 THOUSANDS/UL (ref 149–390)
PMV BLD AUTO: 9.9 FL (ref 8.9–12.7)
POTASSIUM SERPL-SCNC: 3.8 MMOL/L (ref 3.5–5.3)
PROT SERPL-MCNC: 7.4 G/DL (ref 6.4–8.4)
PROT UR STRIP-MCNC: NEGATIVE MG/DL
RBC # BLD AUTO: 5 MILLION/UL (ref 3.88–5.62)
SODIUM SERPL-SCNC: 141 MMOL/L (ref 135–147)
SP GR UR STRIP.AUTO: 1.01 (ref 1–1.03)
UROBILINOGEN UR STRIP-ACNC: <2 MG/DL
WBC # BLD AUTO: 9.27 THOUSAND/UL (ref 4.31–10.16)

## 2022-12-14 RX ORDER — ALPRAZOLAM 0.5 MG/1
0.5 TABLET ORAL ONCE AS NEEDED
Qty: 1 TABLET | Refills: 0 | Status: SHIPPED | OUTPATIENT
Start: 2022-12-14 | End: 2022-12-15 | Stop reason: SDUPTHER

## 2022-12-14 RX ORDER — ALPRAZOLAM 0.25 MG/1
0.5 TABLET ORAL ONCE
Status: COMPLETED | OUTPATIENT
Start: 2022-12-14 | End: 2022-12-14

## 2022-12-14 RX ADMIN — ALPRAZOLAM 0.5 MG: 0.25 TABLET ORAL at 16:10

## 2022-12-14 NOTE — ED PROVIDER NOTES
History  Chief Complaint   Patient presents with   • Hypertension     Pt presents to the ER from dental office with high blood pressure  76year old male presents for evaluation of asymptomatic hypertension noted while at his dentist's office today  Patient has 2 teeth that need to be extracted; however, he was told that because his blood pressure is elevated, he is unable to have the procedure  Patient has chronic hypertension and CKD for which he follows with his PCP and nephrology  He currently takes carvedilol and losartan  Losartan was increased to BID by his nephrologist 3 months ago due to poor control of his blood pressure  Patient has history of anxiety and states he was feeling very anxious today at the time of his visit  History provided by:  Patient and medical records  Hypertension  Severity:  Severe  Timing:  Constant  Chronicity:  Recurrent  Notable PTA blood pressures:  180/112  Relieved by:  Nothing  Exacerbated by: anxiety  Ineffective treatments: losartan, carvedilol  Associated symptoms: anxiety    Associated symptoms: no abdominal pain, no chest pain, no dizziness, no fever, no headaches, no nausea, no shortness of breath and not vomiting        Prior to Admission Medications   Prescriptions Last Dose Informant Patient Reported? Taking?    APPLE CIDER VINEGAR PO   Yes No   Sig: Take 1 tablet by mouth in the morning   Patient not taking: Reported on 9/12/2022   Calcium Ascorbate (VITAMIN C) 500 mg tablet   Yes No   Sig: Take 500 mg by mouth daily   Cholecalciferol (Vitamin D3) 50 MCG (2000 UT) capsule   No No   Sig: Take 1 capsule (2,000 Units total) by mouth daily   Coenzyme Q10 (Co Q 10) 100 MG CAPS   Yes No   Sig: Take 200 mg by mouth in the morning   Multiple Vitamins-Minerals (ONE DAILY MENS) TABS   Yes No   Sig: Take 1 tablet by mouth daily   Probiotic Product (PROBIOTIC DAILY PO)   Yes No   Sig: Take by mouth   TURMERIC PO   Yes No   Sig: Take 1 tablet by mouth in the morning Patient not taking: Reported on 9/12/2022   ZINC CHELATED PO   Yes No   Sig: Take by mouth   Patient not taking: Reported on 9/12/2022   carvedilol (COREG) 6 25 mg tablet   No No   Sig: Take 1 tablet (6 25 mg total) by mouth 2 (two) times a day with meals   losartan (COZAAR) 50 mg tablet   No No   Sig: Take 1 tablet (50 mg total) by mouth 2 (two) times a day      Facility-Administered Medications: None       Past Medical History:   Diagnosis Date   • Abnormal weight loss     Last Assessed: 7/1/2013    • Atrial fibrillation ([address] ) 10/03/2008    From Lyme Disease    • Bell's palsy 10/03/2008    Due to Lyme Disease    • Blurred vision     Last Assessed: 5/20/2014    • Calculus of salivary gland     Last Assessed: 3/18/2014    • Decreased body height 04/15/2010    Last Assessed: 2/10/2012    • GERD (gastroesophageal reflux disease) 1990   • Hypertension 05/20/2011    Last Assessed: 2/11/2013    • Lyme disease 01/27/2012    Last Assessed: 2/10/2012    • Lymphadenitis 05/20/2011   • Tachycardia     Last Assessed: 2/18/2013    • Testis mass 05/13/2010   • Tick bites     Last Assessed: 4/15/2017        Past Surgical History:   Procedure Laterality Date   • APPENDECTOMY  1955   • NO PAST SURGERIES     • US GUIDED THYROID BIOPSY  11/9/2021       Family History   Problem Relation Age of Onset   • Dementia Mother    • Uterine cancer Mother    • Heart disease Father         Cardiac Disorder    • Thyroid disease Father    • Dementia Father    • Drug abuse Brother    • Substance Abuse Brother    • Alcohol abuse Neg Hx      I have reviewed and agree with the history as documented      E-Cigarette/Vaping   • E-Cigarette Use Never User      E-Cigarette/Vaping Substances     Social History     Tobacco Use   • Smoking status: Former     Packs/day: 0 25     Years: 10 00     Pack years: 2 50     Types: Cigarettes   • Smokeless tobacco: Never   • Tobacco comments:     smoked when he was a teenager    Vaping Use   • Vaping Use: Never used Substance Use Topics   • Alcohol use: Yes     Alcohol/week: 1 0 standard drink     Types: 1 Glasses of wine per week     Comment: Drinks 2 beers a week    • Drug use: No       Review of Systems   Constitutional: Negative for chills and fever  HENT: Positive for dental problem  Negative for congestion and sore throat  Respiratory: Negative for cough and shortness of breath  Cardiovascular: Negative for chest pain  Gastrointestinal: Negative for abdominal pain, diarrhea, nausea and vomiting  Neurological: Negative for dizziness, syncope and headaches  Psychiatric/Behavioral: The patient is nervous/anxious  All other systems reviewed and are negative  Physical Exam  Physical Exam  Vitals and nursing note reviewed  Constitutional:       General: He is not in acute distress  Appearance: He is well-developed  He is not toxic-appearing or diaphoretic  HENT:      Head: Normocephalic and atraumatic  Right Ear: External ear normal       Left Ear: External ear normal       Nose: Nose normal    Eyes:      General: No scleral icterus  Pulmonary:      Effort: Pulmonary effort is normal  No respiratory distress  Abdominal:      General: There is no distension  Musculoskeletal:         General: No deformity  Normal range of motion  Skin:     Findings: No rash  Neurological:      General: No focal deficit present  Mental Status: He is alert  Gait: Gait normal    Psychiatric:         Mood and Affect: Mood is anxious           Vital Signs  ED Triage Vitals [12/14/22 1159]   Temperature Pulse Respirations Blood Pressure SpO2   98 2 °F (36 8 °C) 65 18 (!) 206/121 98 %      Temp Source Heart Rate Source Patient Position - Orthostatic VS BP Location FiO2 (%)   Temporal Monitor Sitting Left arm --      Pain Score       No Pain           Vitals:    12/14/22 1456 12/14/22 1530 12/14/22 1600 12/14/22 1617   BP: (!) 195/101 142/84 143/89 142/87   Pulse: 70 66 62 59   Patient Position - Orthostatic VS: Sitting            Visual Acuity      ED Medications  Medications   ALPRAZolam (XANAX) tablet 0 5 mg (0 5 mg Oral Given 12/14/22 1610)       Diagnostic Studies  Results Reviewed     Procedure Component Value Units Date/Time    Comprehensive metabolic panel [[id number]] Collected: 12/14/22 1217    Lab Status: Final result Specimen: Blood from Arm, Left Updated: 12/14/22 1245     Sodium 141 mmol/L      Potassium 3 8 mmol/L      Chloride 107 mmol/L      CO2 25 mmol/L      ANION GAP 9 mmol/L      BUN 23 mg/dL      Creatinine 1 27 mg/dL      Glucose 93 mg/dL      Calcium 9 9 mg/dL      AST 20 U/L      ALT 24 U/L      Alkaline Phosphatase 84 U/L      Total Protein 7 4 g/dL      Albumin 3 8 g/dL      Total Bilirubin 0 50 mg/dL      eGFR 53 ml/min/1 73sq m     Narrative:      [place] guidelines for Chronic Kidney Disease (CKD):   •  Stage 1 with normal or high GFR (GFR > 90 mL/min/1 73 square meters)  •  Stage 2 Mild CKD (GFR = 60-89 mL/min/1 73 square meters)  •  Stage 3A Moderate CKD (GFR = 45-59 mL/min/1 73 square meters)  •  Stage 3B Moderate CKD (GFR = 30-44 mL/min/1 73 square meters)  •  Stage 4 Severe CKD (GFR = 15-29 mL/min/1 73 square meters)  •  Stage 5 End Stage CKD (GFR <15 mL/min/1 73 square meters)  Note: GFR calculation is accurate only with a steady state creatinine    UA w Reflex to Microscopic w Reflex to Culture [[id number]]  (Abnormal) Collected: 12/14/22 1232    Lab Status: Final result Specimen: Urine, Clean Catch Updated: 12/14/22 1240     Color, UA Light Yellow     Clarity, UA Clear     Specific Gravity, UA 1 015     pH, UA 6 0     Leukocytes, UA Negative     Nitrite, UA Negative     Protein, UA Negative mg/dl      Glucose, UA Negative mg/dl      Ketones, UA 10 (1+) mg/dl      Urobilinogen, UA <2 0 mg/dl      Bilirubin, UA Negative     Occult Blood, UA Negative    CBC and differential [[id number]] Collected: 12/14/22 1217    Lab Status: Final result Specimen: Blood from Arm, Left Updated: 12/14/22 1224     WBC 9 27 Thousand/uL      RBC 5 00 Million/uL      Hemoglobin 15 1 g/dL      Hematocrit 45 5 %      MCV 91 fL      MCH 30 2 pg      MCHC 33 2 g/dL      RDW 13 0 %      MPV 9 9 fL      Platelets 153 Thousands/uL      nRBC 0 /100 WBCs      Neutrophils Relative 63 %      Immat GRANS % 0 %      Lymphocytes Relative 26 %      Monocytes Relative 8 %      Eosinophils Relative 2 %      Basophils Relative 1 %      Neutrophils Absolute 5 79 Thousands/µL      Immature Grans Absolute 0 03 Thousand/uL      Lymphocytes Absolute 2 43 Thousands/µL      Monocytes Absolute 0 78 Thousand/µL      Eosinophils Absolute 0 18 Thousand/µL      Basophils Absolute 0 06 Thousands/µL                  No orders to display              Procedures  ECG 12 Lead Documentation Only    Date/Time: 12/14/2022 12:05 PM  Performed by: Larry Lainez MD  Authorized by: Larry Lainez MD     Indications / Diagnosis:  Hypertension  ECG reviewed by me, the ED Provider: yes    Patient location:  ED  Previous ECG:     Previous ECG:  Unavailable  Interpretation:     Interpretation: abnormal    Rate:     ECG rate:  68    ECG rate assessment: normal    Rhythm:     Rhythm: sinus rhythm    Ectopy:     Ectopy: none    QRS:     QRS axis:  Right    QRS intervals: Wide  Conduction:     Conduction: abnormal      Abnormal conduction: incomplete RBBB    ST segments:     ST segments:  Normal  T waves:     T waves: inverted      Inverted:  III             ED Course                               SBIRT 20yo+    Flowsheet Row Most Recent Value   SBIRT (22 yo +)    In order to provide better care to our patients, we are screening all of our patients for alcohol and drug use  Would it be okay to ask you these screening questions? No Filed at: 12/14/2022 1210   Initial Alcohol Screen: US AUDIT-C     1  How often do you have a drink containing alcohol? 0 Filed at: 12/14/2022 1210   2   How many drinks containing alcohol do you have on a typical day you are drinking? 0 Filed at: 12/14/2022 1210   3a  Male UNDER 65: How often do you have five or more drinks on one occasion? 0 Filed at: 12/14/2022 1210   Audit-C Score 0 Filed at: 12/14/2022 1210   ADRIEN: How many times in the past year have you    Used an illegal drug or used a prescription medication for non-medical reasons? Never Filed at: 12/14/2022 1210                    MDM  Number of Diagnoses or Management Options  Anxiety: new and requires workup  Asymptomatic hypertension: new and requires workup  Diagnosis management comments: 76year old male presents for evaluation of asymptomatic hypertension  Patient hypertensive on arrival  EKG and labs unremarkable  Suspect anxiety vs white coat hypertension after reviewing records  0 5 mg Xanax given in ED with significant improvement in blood pressure  Single dose prescribed to be taken prior to dental visit  Patient advised to keep a log of his blood pressures at home and then follow up with his PCP for further management of his hypertension and anxiety  Amount and/or Complexity of Data Reviewed  Clinical lab tests: ordered and reviewed    Patient Progress  Patient progress: stable      Disposition  Final diagnoses:   Asymptomatic hypertension   Anxiety     Time reflects when diagnosis was documented in both MDM as applicable and the Disposition within this note     Time User Action Codes Description Comment    12/14/2022  4:19 PM Zion SOLOMON Add [I10] Asymptomatic hypertension     12/14/2022  4:19 PM Lana Fothergill Add [F41 9] Anxiety       ED Disposition     ED Disposition   Discharge    Condition   Stable    Date/Time   Wed Dec 14, 2022  4:20 PM    Comment   [facility] discharge to home/self care                 Follow-up Information     Follow up With Specialties Details Why Contact Info Additional Information    Obie Belcher,  Family Medicine Schedule an appointment as soon as possible for a visit in 1 week for re-evaluation of your blood pressure and anxiety [address] Emergency Department Emergency Medicine Go to  If symptoms worsen [address] [place], [id number]  [address] [facility] Emergency Department, [address] 10          Patient's Medications   Discharge Prescriptions    ALPRAZOLAM (XANAX) 0 5 MG TABLET    Take 1 tablet (0 5 mg total) by mouth once as needed for anxiety (prior to dental visit) for up to 1 dose       Start Date: 12/14/2022End Date: --       Order Dose: 0 5 mg       Quantity: 1 tablet    Refills: 0       No discharge procedures on file      PDMP Review     None          ED Provider  Electronically Signed by           Larry Lainez MD  12/14/22 1471       Larry Lainez MD  12/14/22 7738

## 2022-12-14 NOTE — TELEPHONE ENCOUNTER
Arleth Cruz called back and said that the dentist advised him to go to Urgent Care  He went and they advised him that they didn't have the facilities to accomodate him, he needs to go to the ER  Arleth Cruz wanted to know if I thought he really needed to do this  I told him that if a dentist with medical training is advising him to do so, he needs to go  He said he would  No/Hives only...

## 2022-12-14 NOTE — TELEPHONE ENCOUNTER
Chiki Schulte is at the dentist and needs to have 2 teeth pulled  His BP is 180/112 and they are questioning whether they should go ahead with the procedure  I advised him that I would give you the information but we would not be able to give him an answer immediately

## 2022-12-15 DIAGNOSIS — F41.9 ANXIETY: ICD-10-CM

## 2022-12-15 LAB
ATRIAL RATE: 68 BPM
P AXIS: -20 DEGREES
PR INTERVAL: 176 MS
QRS AXIS: 104 DEGREES
QRSD INTERVAL: 104 MS
QT INTERVAL: 396 MS
QTC INTERVAL: 421 MS
T WAVE AXIS: 10 DEGREES
VENTRICULAR RATE: 68 BPM

## 2022-12-15 RX ORDER — ALPRAZOLAM 0.25 MG/1
0.25 TABLET ORAL ONCE AS NEEDED
Qty: 4 TABLET | Refills: 0 | Status: SHIPPED | OUTPATIENT
Start: 2022-12-15 | End: 2023-09-14 | Stop reason: SDUPTHER

## 2022-12-15 NOTE — TELEPHONE ENCOUNTER
Patient does want to have a script for xanax      Please send to [facility] марина

## 2022-12-16 ENCOUNTER — VBI (OUTPATIENT)
Dept: ADMINISTRATIVE | Facility: OTHER | Age: 77
End: 2022-12-16

## 2023-01-09 ENCOUNTER — RA CDI HCC (OUTPATIENT)
Dept: OTHER | Facility: HOSPITAL | Age: 78
End: 2023-01-09

## 2023-01-09 NOTE — PROGRESS NOTES
[facility]  coding opportunities       Chart reviewed, no opportunity found: CHART REVIEWED, NO OPPORTUNITY FOUND        Patients Insurance     Medicare Insurance: [employer] [facility] Advantage

## 2023-01-10 ENCOUNTER — VBI (OUTPATIENT)
Dept: ADMINISTRATIVE | Facility: OTHER | Age: 78
End: 2023-01-10

## 2023-01-10 ENCOUNTER — TELEPHONE (OUTPATIENT)
Dept: ADMINISTRATIVE | Facility: OTHER | Age: 78
End: 2023-01-10

## 2023-01-10 NOTE — TELEPHONE ENCOUNTER
01/10/23 11:27 AM    The patient was called and a message could not be left on the phone number on file/ phone number on file is incorrect  Thank you    Christina Tian MA  [facility]

## 2023-01-16 ENCOUNTER — OFFICE VISIT (OUTPATIENT)
Dept: FAMILY MEDICINE CLINIC | Facility: CLINIC | Age: 78
End: 2023-01-16

## 2023-01-16 ENCOUNTER — APPOINTMENT (OUTPATIENT)
Dept: LAB | Facility: CLINIC | Age: 78
End: 2023-01-16

## 2023-01-16 VITALS
SYSTOLIC BLOOD PRESSURE: 144 MMHG | RESPIRATION RATE: 16 BRPM | DIASTOLIC BLOOD PRESSURE: 90 MMHG | HEART RATE: 50 BPM | OXYGEN SATURATION: 99 % | WEIGHT: 165 LBS | BODY MASS INDEX: 23.62 KG/M2 | HEIGHT: 70 IN

## 2023-01-16 DIAGNOSIS — E83.52 HYPERCALCEMIA: ICD-10-CM

## 2023-01-16 DIAGNOSIS — Z23 NEED FOR VACCINATION: ICD-10-CM

## 2023-01-16 DIAGNOSIS — N18.31 CHRONIC KIDNEY DISEASE, STAGE 3A (HCC): ICD-10-CM

## 2023-01-16 DIAGNOSIS — R45.0 NERVOUSNESS: ICD-10-CM

## 2023-01-16 DIAGNOSIS — I10 ESSENTIAL HYPERTENSION: Primary | ICD-10-CM

## 2023-01-16 DIAGNOSIS — E55.9 VITAMIN D DEFICIENCY: ICD-10-CM

## 2023-01-16 LAB
25(OH)D3 SERPL-MCNC: 45.8 NG/ML (ref 30–100)
ALBUMIN SERPL BCP-MCNC: 3.7 G/DL (ref 3.5–5)
ALP SERPL-CCNC: 74 U/L (ref 46–116)
ALT SERPL W P-5'-P-CCNC: 24 U/L (ref 12–78)
ANION GAP SERPL CALCULATED.3IONS-SCNC: 3 MMOL/L (ref 4–13)
AST SERPL W P-5'-P-CCNC: 20 U/L (ref 5–45)
BASOPHILS # BLD AUTO: 0.05 THOUSANDS/ÂΜL (ref 0–0.1)
BASOPHILS NFR BLD AUTO: 1 % (ref 0–1)
BILIRUB SERPL-MCNC: 0.44 MG/DL (ref 0.2–1)
BUN SERPL-MCNC: 26 MG/DL (ref 5–25)
CALCIUM SERPL-MCNC: 10 MG/DL (ref 8.3–10.1)
CHLORIDE SERPL-SCNC: 110 MMOL/L (ref 96–108)
CHOLEST SERPL-MCNC: 158 MG/DL
CO2 SERPL-SCNC: 26 MMOL/L (ref 21–32)
CREAT SERPL-MCNC: 1.34 MG/DL (ref 0.6–1.3)
EOSINOPHIL # BLD AUTO: 0.18 THOUSAND/ÂΜL (ref 0–0.61)
EOSINOPHIL NFR BLD AUTO: 2 % (ref 0–6)
ERYTHROCYTE [DISTWIDTH] IN BLOOD BY AUTOMATED COUNT: 13.2 % (ref 11.6–15.1)
GFR SERPL CREATININE-BSD FRML MDRD: 50 ML/MIN/1.73SQ M
GLUCOSE P FAST SERPL-MCNC: 86 MG/DL (ref 65–99)
HCT VFR BLD AUTO: 45.4 % (ref 36.5–49.3)
HDLC SERPL-MCNC: 43 MG/DL
HGB BLD-MCNC: 15 G/DL (ref 12–17)
IMM GRANULOCYTES # BLD AUTO: 0.03 THOUSAND/UL (ref 0–0.2)
IMM GRANULOCYTES NFR BLD AUTO: 0 % (ref 0–2)
LDLC SERPL CALC-MCNC: 97 MG/DL (ref 0–100)
LYMPHOCYTES # BLD AUTO: 2.06 THOUSANDS/ÂΜL (ref 0.6–4.47)
LYMPHOCYTES NFR BLD AUTO: 26 % (ref 14–44)
MAGNESIUM SERPL-MCNC: 2.6 MG/DL (ref 1.6–2.6)
MCH RBC QN AUTO: 30.7 PG (ref 26.8–34.3)
MCHC RBC AUTO-ENTMCNC: 33 G/DL (ref 31.4–37.4)
MCV RBC AUTO: 93 FL (ref 82–98)
MONOCYTES # BLD AUTO: 0.78 THOUSAND/ÂΜL (ref 0.17–1.22)
MONOCYTES NFR BLD AUTO: 10 % (ref 4–12)
NEUTROPHILS # BLD AUTO: 4.84 THOUSANDS/ÂΜL (ref 1.85–7.62)
NEUTS SEG NFR BLD AUTO: 61 % (ref 43–75)
NONHDLC SERPL-MCNC: 115 MG/DL
NRBC BLD AUTO-RTO: 0 /100 WBCS
PHOSPHATE SERPL-MCNC: 2.6 MG/DL (ref 2.3–4.1)
PLATELET # BLD AUTO: 250 THOUSANDS/UL (ref 149–390)
PMV BLD AUTO: 10.7 FL (ref 8.9–12.7)
POTASSIUM SERPL-SCNC: 4.8 MMOL/L (ref 3.5–5.3)
PROT SERPL-MCNC: 6.7 G/DL (ref 6.4–8.4)
PSA SERPL-MCNC: 1.3 NG/ML (ref 0–4)
PTH-INTACT SERPL-MCNC: 112.1 PG/ML (ref 18.4–80.1)
RBC # BLD AUTO: 4.89 MILLION/UL (ref 3.88–5.62)
SODIUM SERPL-SCNC: 139 MMOL/L (ref 135–147)
TRIGL SERPL-MCNC: 89 MG/DL
TSH SERPL DL<=0.05 MIU/L-ACNC: 3.77 UIU/ML (ref 0.45–4.5)
URATE SERPL-MCNC: 6.2 MG/DL (ref 3.5–8.5)
WBC # BLD AUTO: 7.94 THOUSAND/UL (ref 4.31–10.16)

## 2023-01-16 RX ORDER — ESCITALOPRAM OXALATE 10 MG/1
10 TABLET ORAL DAILY
Qty: 30 TABLET | Refills: 3 | Status: SHIPPED | OUTPATIENT
Start: 2023-01-16

## 2023-01-16 NOTE — PATIENT INSTRUCTIONS
Go to [facility] lab today and have the blood work done that was ordered 2022-07-21    Please start Lexapro/escitalopram 10 mg once daily    See you back in 6 weeks to see how you are doing  For what ever reason you decide to stop the pill call before stopping it

## 2023-01-16 NOTE — PROGRESS NOTES
ASSESSMENT/PLAN:    General anxiety disorder  Patient has been this way for decades  Finally ready to be treated so hopefully this will bring his blood pressure down more than anything else  We will begin Lexapro 10 mg daily and recheck in 6 weeks    Hypertension  Losartan twice daily and Coreg twice daily  We will see what his blood pressure is at his next visit in 6 weeks    Chronic kidney disease stage III AAA   GFR improved 53 from 44  Continue to watch this at least every 6 months     Thyroid nodules  2 thyroids biopsies  Both scant cells  Cells they do have are benign      Patient to get blood work today that was ordered from 2022-07-21 patient to come back in 6 weeks to see how he is doing on the Lexapro         Depression Screening and Follow-up Plan: Patient was screened for depression during today's encounter  They screened negative with a PHQ-2 score of 0             Health Maintenance   Topic Date Due   • SLP PLAN OF CARE  Never done   • Pneumococcal Vaccine: 65+ Years (2 - PCV) 04/15/2011   • COVID-19 Vaccine (3 - Booster for Moderna series) 04/17/2021   • Influenza Vaccine (1) 09/01/2022   • Hepatitis C Screening  11/29/2023 (Originally 1945)   • Fall Risk  06/13/2023   • Medicare Annual Wellness Visit (AWV)  06/13/2023   • BMI: Adult  12/14/2023   • Depression Screening  01/16/2024   • HIB Vaccine  Aged Out   • IPV Vaccine  Aged Out   • Hepatitis A Vaccine  Aged Out   • Meningococcal ACWY Vaccine  Aged Out   • HPV Vaccine  Aged Out         Problem List as of 1/16/2023 Reviewed: 7/25/2022 10:28 AM by Milagros Pinto DO    Chronic kidney disease, stage 3a ([facility] [address] )    Creatinine elevation    Essential hypertension    Hypercalcemia    Nervousness    Nontoxic single thyroid nodule    Urinary stream slowing    Vitamin D deficiency         Subjective:   Chief Complaint   Patient presents with   • Hypertension   • Chronic Kidney Disease     Patient is here to recheck his blood pressure and also to admit that he realizes he really is nervous in its time to finally treat him    Since last visit he also saw nephrology and his kidney function has improved a bit    Forgot about the blood work I needed for this visit but will get it before he comes back in 6 weeks for his anxiety medicine check      patient ID: Jovani Coon is a 76 y o  male  Patient's past medical history, surgical history, family history, social history, and Tobacco history reviewed with patient  MED LIST WAS REVIEWED AND UPDATED    ROS  As per HPI  Rest of 12 point review of systems negative     Objective:      VITALS:  Wt Readings from Last 3 Encounters:   01/16/23 74 8 kg (165 lb)   12/14/22 72 6 kg (160 lb)   09/12/22 74 8 kg (165 lb)     BP Readings from Last 3 Encounters:   01/16/23 144/90   12/14/22 142/87   09/12/22 160/100     Pulse Readings from Last 3 Encounters:   01/16/23 (!) 50   12/14/22 59   07/25/22 62     Body mass index is 24 02 kg/m²  Laboratory Results:    All pertinent labs and studies were reviewed with patient during this office visit with highlights of the results contained in this note in the ASSESSMENT AND PLAN section       Physical Exam  Constitutional  Appears healthy, Looks well, Appearance consistent with age    Mental Status  Alert, Oriented, Cooperative, Memory function normal , clean, and reasonable    Neck  No neck mass, No thyromegaly, Good carotid upstrokes bilaterally, trachea midline positive click    Respiratory  Breath sounds normal, No rales, No rhonchi, No wheezing, normal palpation    Cardiac  Regular rhythm without ectopy or murmur no S3-S4, no heave lift or thrill to palpation    Vascular  No leg edema, No pedal edema    Muscular skeletal  No clubbing cyanosis , muscle tone normal    Skin  No appreciable rashes or abnormal appearing lesions

## 2023-01-18 ENCOUNTER — TELEPHONE (OUTPATIENT)
Dept: NEPHROLOGY | Facility: HOSPITAL | Age: 78
End: 2023-01-18

## 2023-01-18 NOTE — TELEPHONE ENCOUNTER
----- Message from Aaron Garcia DO sent at 1/17/2023  1:07 PM EST -----  Creatinine stable PTH mildly elevated on VItamin D 3 no chnamarc currently

## 2023-02-09 DIAGNOSIS — R45.0 NERVOUSNESS: ICD-10-CM

## 2023-02-09 RX ORDER — ESCITALOPRAM OXALATE 10 MG/1
TABLET ORAL
Qty: 90 TABLET | Refills: 2 | Status: SHIPPED | OUTPATIENT
Start: 2023-02-09

## 2023-02-16 DIAGNOSIS — I10 ESSENTIAL HYPERTENSION: ICD-10-CM

## 2023-02-16 RX ORDER — CARVEDILOL 6.25 MG/1
TABLET ORAL
Qty: 180 TABLET | Refills: 1 | Status: SHIPPED | OUTPATIENT
Start: 2023-02-16

## 2023-02-16 RX ORDER — LOSARTAN POTASSIUM 50 MG/1
TABLET ORAL
Qty: 180 TABLET | Refills: 1 | Status: SHIPPED | OUTPATIENT
Start: 2023-02-16

## 2023-02-20 ENCOUNTER — OFFICE VISIT (OUTPATIENT)
Dept: FAMILY MEDICINE CLINIC | Facility: CLINIC | Age: 78
End: 2023-02-20

## 2023-02-20 ENCOUNTER — APPOINTMENT (OUTPATIENT)
Dept: RADIOLOGY | Facility: CLINIC | Age: 78
End: 2023-02-20

## 2023-02-20 VITALS
WEIGHT: 167.2 LBS | HEIGHT: 70 IN | BODY MASS INDEX: 23.94 KG/M2 | OXYGEN SATURATION: 97 % | DIASTOLIC BLOOD PRESSURE: 82 MMHG | HEART RATE: 59 BPM | RESPIRATION RATE: 16 BRPM | SYSTOLIC BLOOD PRESSURE: 134 MMHG | TEMPERATURE: 98 F

## 2023-02-20 DIAGNOSIS — M85.80 OSTEOPENIA, UNSPECIFIED LOCATION: ICD-10-CM

## 2023-02-20 DIAGNOSIS — M54.50 LUMBAR BACK PAIN: Primary | ICD-10-CM

## 2023-02-20 DIAGNOSIS — M54.50 LUMBAR BACK PAIN: ICD-10-CM

## 2023-02-20 RX ORDER — METHOCARBAMOL 500 MG/1
500 TABLET, FILM COATED ORAL 3 TIMES DAILY PRN
Qty: 30 TABLET | Refills: 0 | Status: SHIPPED | OUTPATIENT
Start: 2023-02-20

## 2023-02-20 NOTE — PROGRESS NOTES
Assessment/Plan:     Diagnoses and all orders for this visit:    Lumbar back pain  -     XR spine lumbar minimum 4 views non injury; Future  -     Ambulatory Referral to Physical Therapy; Future  -     methocarbamol (ROBAXIN) 500 mg tablet; Take 1 tablet (500 mg total) by mouth 3 (three) times a day as needed for muscle spasms    Xray ordered for further evaluation  Pt may use Tylenol PRN as well as Robaxin PRN for any muscle spasms  Medication and s/e reviewed  PT referral placed as well  We will contact pt w/ results once available  Patient is encouraged to call our office for any questions/concerns, persistent or worsening symptoms  Patient states they understand and agree with treatment plan  Osteopenia, unspecified location  -     DXA bone density spine hip and pelvis; Future    Hx of osteopenia noted in 2014  Dexa never repeated  Dexa ordered  Pt to f/u PRN  F/u pending results  Subjective:      Patient ID: Warner Coe is a 76 y o  male  Pt presents for delgado lower back pain that started on Friday  He admits that 2 weeks ago, he was splitting wood  This past week, patient admits that he was sitting for 8 hours a day helping his wife wrap items  Pt notes that sitting down makes the pain worse  Lying down on his stomach does not seem to aggravate the pain  Pt notes that moving from a sitting to standing position makes the pain worse  Denies fever, chills, loss of bowel bladder control, numbness/tingling, or radiation of pain  Pt has not tried any OTC medications        The following portions of the patient's history were reviewed and updated as appropriate: allergies, current medications, past family history, past medical history, past social history, past surgical history and problem list     Review of Systems    As noted per HPI      Objective:      /82   Pulse 59   Temp 98 °F (36 7 °C)   Resp 16   Ht 5' 9 5" (1 765 m)   Wt 75 8 kg (167 lb 3 2 oz)   SpO2 97%   BMI 24 34 kg/m²          Physical Exam  Constitutional:       Appearance: Normal appearance  Pulmonary:      Effort: Pulmonary effort is normal    Musculoskeletal:      Lumbar back: No edema  Decreased range of motion (decreased flexion, normal rotation and delgado lateral flexion)  Right lower leg: No edema  Left lower leg: No edema  Comments: Area of discomfort to left and right lumbar spine, not TTP  Normal gait, heel and toe walk  5/5 plantar flexion and dorsiflexion   Neurological:      Mental Status: He is alert and oriented to person, place, and time  Mental status is at baseline  Psychiatric:         Mood and Affect: Mood normal          Behavior: Behavior normal          Thought Content:  Thought content normal          Judgment: Judgment normal

## 2023-03-02 ENCOUNTER — DOCUMENTATION (OUTPATIENT)
Dept: NEPHROLOGY | Facility: CLINIC | Age: 78
End: 2023-03-02

## 2023-03-02 NOTE — PROGRESS NOTES
Sent message via [employer] to schedule a September follow up with Dr Davis Brought in the [facility] office  This is the first attempt

## 2023-03-13 ENCOUNTER — TELEPHONE (OUTPATIENT)
Dept: NEPHROLOGY | Facility: CLINIC | Age: 78
End: 2023-03-13

## 2023-06-06 ENCOUNTER — TELEPHONE (OUTPATIENT)
Dept: FAMILY MEDICINE CLINIC | Facility: CLINIC | Age: 78
End: 2023-06-06

## 2023-06-06 NOTE — TELEPHONE ENCOUNTER
Does patient need labs? I see some in his chart for his kidney Dr, but none for us   And does he need an appt?      [phone]    If response is after 4 , send to [facility]

## 2023-07-03 ENCOUNTER — OFFICE VISIT (OUTPATIENT)
Dept: FAMILY MEDICINE CLINIC | Facility: CLINIC | Age: 78
End: 2023-07-03
Payer: COMMERCIAL

## 2023-07-03 VITALS
BODY MASS INDEX: 23.24 KG/M2 | SYSTOLIC BLOOD PRESSURE: 124 MMHG | DIASTOLIC BLOOD PRESSURE: 80 MMHG | WEIGHT: 162.3 LBS | HEIGHT: 70 IN | RESPIRATION RATE: 15 BRPM | HEART RATE: 60 BPM | OXYGEN SATURATION: 98 %

## 2023-07-03 DIAGNOSIS — L91.8 SKIN TAG: ICD-10-CM

## 2023-07-03 DIAGNOSIS — R39.198 URINARY STREAM SLOWING: ICD-10-CM

## 2023-07-03 DIAGNOSIS — E55.9 VITAMIN D DEFICIENCY: ICD-10-CM

## 2023-07-03 DIAGNOSIS — I10 ESSENTIAL HYPERTENSION: ICD-10-CM

## 2023-07-03 DIAGNOSIS — E83.52 HYPERCALCEMIA: ICD-10-CM

## 2023-07-03 DIAGNOSIS — E04.1 NONTOXIC SINGLE THYROID NODULE: ICD-10-CM

## 2023-07-03 DIAGNOSIS — Z00.00 MEDICARE ANNUAL WELLNESS VISIT, SUBSEQUENT: Primary | ICD-10-CM

## 2023-07-03 DIAGNOSIS — R45.0 NERVOUSNESS: ICD-10-CM

## 2023-07-03 DIAGNOSIS — F41.9 ANXIETY: ICD-10-CM

## 2023-07-03 DIAGNOSIS — N18.31 CHRONIC KIDNEY DISEASE, STAGE 3A (HCC): ICD-10-CM

## 2023-07-03 PROCEDURE — 1125F AMNT PAIN NOTED PAIN PRSNT: CPT | Performed by: FAMILY MEDICINE

## 2023-07-03 PROCEDURE — G0439 PPPS, SUBSEQ VISIT: HCPCS | Performed by: FAMILY MEDICINE

## 2023-07-03 PROCEDURE — 99214 OFFICE O/P EST MOD 30 MIN: CPT | Performed by: FAMILY MEDICINE

## 2023-07-03 PROCEDURE — 1159F MED LIST DOCD IN RCRD: CPT | Performed by: FAMILY MEDICINE

## 2023-07-03 PROCEDURE — 1160F RVW MEDS BY RX/DR IN RCRD: CPT | Performed by: FAMILY MEDICINE

## 2023-07-03 PROCEDURE — 1101F PT FALLS ASSESS-DOCD LE1/YR: CPT | Performed by: FAMILY MEDICINE

## 2023-07-03 PROCEDURE — 3288F FALL RISK ASSESSMENT DOCD: CPT | Performed by: FAMILY MEDICINE

## 2023-07-03 PROCEDURE — 1170F FXNL STATUS ASSESSED: CPT | Performed by: FAMILY MEDICINE

## 2023-07-03 NOTE — PROGRESS NOTES
Assessment and Plan:   UP TO DATE  Problem List Items Addressed This Visit     Chronic kidney disease, stage 3a ([address])    Essential hypertension    Hypercalcemia    Nervousness    Nontoxic single thyroid nodule    Urinary stream slowing    Vitamin D deficiency   Other Visit Diagnoses     Medicare annual wellness visit, subsequent    -  Primary           Preventive health issues were discussed with patient, and age appropriate screening tests were ordered as noted in patient's After Visit Summary. Personalized health advice and appropriate referrals for health education or preventive services given if needed, as noted in patient's After Visit Summary.      History of Present Illness:     Patient presents for a Medicare Wellness Visit    HPI   Patient Care Team:  Ter Lane DO as PCP - General  Tre Lane DO as PCP - PCP-[facility] Attributed-Roster     Review of Systems:     Review of Systems     Problem List:     Patient Active Problem List   Diagnosis   • Essential hypertension   • Nervousness   • Nontoxic single thyroid nodule   • Chronic kidney disease, stage 3a ([address])   • Vitamin D deficiency   • Urinary stream slowing   • Hypercalcemia      Past Medical and Surgical History:     Past Medical History:   Diagnosis Date   • Abnormal weight loss     Last Assessed: 7/1/2013    • Atrial fibrillation ([address]) 10/03/2008    From Lyme Disease    • Bell's palsy 10/03/2008    Due to Lyme Disease    • Blurred vision     Last Assessed: 5/20/2014    • Calculus of salivary gland     Last Assessed: 3/18/2014    • Decreased body height 04/15/2010    Last Assessed: 2/10/2012    • GERD (gastroesophageal reflux disease) 1990   • Hypertension 05/20/2011    Last Assessed: 2/11/2013    • Lyme disease 01/27/2012    Last Assessed: 2/10/2012    • Lymphadenitis 05/20/2011   • Tachycardia     Last Assessed: 2/18/2013    • Testis mass 05/13/2010   • Tick bites     Last Assessed: 4/15/2017      Past Surgical History:   Procedure Laterality Date   • APPENDECTOMY  1955   • NO PAST SURGERIES     • US GUIDED THYROID BIOPSY  11/9/2021      Family History:     Family History   Problem Relation Age of Onset   • Dementia Mother    • Uterine cancer Mother    • Heart disease Father         Cardiac Disorder    • Thyroid disease Father    • Dementia Father    • Drug abuse Brother    • Substance Abuse Brother    • Alcohol abuse Neg Hx       Social History:     Social History     Socioeconomic History   • Marital status: /Civil Union     Spouse name: None   • Number of children: None   • Years of education: None   • Highest education level: None   Occupational History   • Occupation: Works with [employer]      Comment: Exposed to hazardous Substances    Tobacco Use   • Smoking status: Former     Packs/day: 0.25     Years: 10.00     Total pack years: 2.50     Types: Cigarettes   • Smokeless tobacco: Never   • Tobacco comments:     smoked when he was a teenager    Vaping Use   • Vaping Use: Never used   Substance and Sexual Activity   • Alcohol use: Yes     Alcohol/week: 1.0 standard drink of alcohol     Types: 1 Glasses of wine per week     Comment: Drinks 2 beers a week    • Drug use: No   • Sexual activity: Yes     Birth control/protection: None   Other Topics Concern   • None   Social History Narrative    Drinks Coffee- 1 cup a day     Has smoke detectors     Denied: History of Uses Safety Equipment- Seatbelts      Social Determinants of Health     Financial Resource Strain: Low Risk  (7/3/2023)    Overall Financial Resource Strain (CARDIA)    • Difficulty of Paying Living Expenses: Not very hard   Food Insecurity: Not on file   Transportation Needs: No Transportation Needs (7/3/2023)    PRAPARE - Transportation    • Lack of Transportation (Medical): No    • Lack of Transportation (Non-Medical):  No   Physical Activity: Not on file   Stress: Not on file   Social Connections: Not on file   Intimate Partner Violence: Not on file   Housing Stability: Not on file      Medications and Allergies:     Current Outpatient Medications   Medication Sig Dispense Refill   • APPLE CIDER VINEGAR PO Take 1 tablet by mouth in the morning     • Calcium Ascorbate (VITAMIN C) 500 mg tablet Take 500 mg by mouth daily     • carvedilol (COREG) 6.25 mg tablet TAKE 1 TABLET BY MOUTH TWICE A DAY WITH MEALS 180 tablet 1   • Cholecalciferol (Vitamin D3) 50 MCG (2000 UT) capsule Take 1 capsule (2,000 Units total) by mouth daily  0   • Coenzyme Q10 (Co Q 10) 100 MG CAPS Take 200 mg by mouth in the morning     • Flaxseed, Linseed, (FLAX SEED OIL PO) Take by mouth     • losartan (COZAAR) 50 mg tablet TAKE 1 TABLET BY MOUTH TWICE A  tablet 1   • Probiotic Product (PROBIOTIC DAILY PO) Take by mouth     • TURMERIC PO Take 1 tablet by mouth in the morning     • ALPRAZolam (XANAX) 0.25 mg tablet Take 1 tablet (0.25 mg total) by mouth once as needed for anxiety (prior to dental visit) for up to 1 dose per dental visit (Patient not taking: Reported on 2/20/2023) 4 tablet 0   • methocarbamol (ROBAXIN) 500 mg tablet Take 1 tablet (500 mg total) by mouth 3 (three) times a day as needed for muscle spasms (Patient not taking: Reported on 7/3/2023) 30 tablet 0     No current facility-administered medications for this visit.      No Known Allergies   Immunizations:     Immunization History   Administered Date(s) Administered   • COVID-19 MODERNA VACC 0.5 ML IM 01/25/2021, 02/20/2021   • INFLUENZA 10/06/2016, 10/31/2017, 09/23/2020   • Influenza Split High Dose Preservative Free IM 10/06/2016, 10/31/2017, 11/01/2019   • Pneumococcal Conjugate Vaccine 20-valent (Pcv20), Polysace 01/16/2023   • Pneumococcal Polysaccharide PPV23 04/15/2010   • Td (adult), adsorbed 04/15/2010      Health Maintenance:         Topic Date Due   • Hepatitis C Screening  11/29/2023 (Originally 1945)         Topic Date Due   • COVID-19 Vaccine (3 - Moderna series) 04/17/2021   • Influenza Vaccine (1) 09/01/2023      Medicare Screening Tests and Risk Assessments:     Rosalina Sanchez is here for his Subsequent Wellness visit. Health Risk Assessment:   Patient rates overall health as good. Patient feels that their physical health rating is same. Patient is satisfied with their life. Eyesight was rated as same. Hearing was rated as same. Patient feels that their emotional and mental health rating is same. Patients states they are never, rarely angry. Patient states they are always unusually tired/fatigued. Pain experienced in the last 7 days has been none. Patient states that he has experienced no weight loss or gain in last 6 months. Fall Risk Screening: In the past year, patient has experienced: no history of falling in past year      Home Safety:  Patient does not have trouble with stairs inside or outside of their home. Patient has working smoke alarms and has working carbon monoxide detector. Home safety hazards include: none. Nutrition:   Current diet is Regular and Limited junk food. Medications:   Patient is currently taking over-the-counter supplements. OTC medications include: see medication list. Patient is able to manage medications. Activities of Daily Living (ADLs)/Instrumental Activities of Daily Living (IADLs):   Walk and transfer into and out of bed and chair?: Yes  Dress and groom yourself?: Yes    Bathe or shower yourself?: Yes    Feed yourself?  Yes  Do your laundry/housekeeping?: Yes  Manage your money, pay your bills and track your expenses?: Yes  Make your own meals?: Yes    Do your own shopping?: Yes    Previous Hospitalizations:   Any hospitalizations or ED visits within the last 12 months?: No      Advance Care Planning:   Living will: Yes    Advanced directive: Yes    End of Life Decisions reviewed with patient: Yes      Cognitive Screening:   Provider or family/friend/caregiver concerned regarding cognition?: No    PREVENTIVE SCREENINGS      Cardiovascular Screening: General: Screening Current      Diabetes Screening:     General: Screening Current      Colorectal Cancer Screening:     General: Screening Not Indicated      Prostate Cancer Screening:    General: Screening Not Indicated      Osteoporosis Screening:      Due for: DXA Axial      Abdominal Aortic Aneurysm (AAA) Screening:    Risk factors include: tobacco use        General: Screening Not Indicated      Lung Cancer Screening:     General: Screening Not Indicated      Hepatitis C Screening:    General: Screening Current    Screening, Brief Intervention, and Referral to Treatment (SBIRT)    Screening  Typical number of drinks in a day: 0  Typical number of drinks in a week: 2  Interpretation: Low risk drinking behavior. Single Item Drug Screening:  How often have you used an illegal drug (including marijuana) or a prescription medication for non-medical reasons in the past year? never    Single Item Drug Screen Score: 0  Interpretation: Negative screen for possible drug use disorder    Brief Intervention  Alcohol & drug use screenings were reviewed. No concerns regarding substance use disorder identified. Other Counseling Topics:   Regular weightbearing exercise. No results found.      Physical Exam:     /80   Pulse 60   Resp 15   Ht 5' 9.5" (1.765 m)   Wt 73.6 kg (162 lb 4.8 oz)   SpO2 98%   BMI 23.62 kg/m²     Physical Exam     Nay Bryan DO

## 2023-07-03 NOTE — PROGRESS NOTES
ASSESSMENT/PLAN:    General anxiety disorder  Patient was on Lexapro but stopped because of side effect his son-in-law had     Hypertension  Blood pressure is good 124/80  Continue losartan and Coreg     Chronic kidney disease stage III AAA   GFR improved 53 from 44,  50 now  Continue to watch this at least every 6 months     Thyroid nodules  2 thyroids biopsies  Both scant cells  Cells they do have are benign    Hypercalcemia  Calcium is controlled at this time and being watched by nephrology    Vitamin D deficiency  Patient is taking vitamin D daily 2000 units    Urinary stream slowing  Secondary to BPH  No meds      Recheck in 6 months or sooner if needed  Fasting blood work prior  See plastic surgery regarding skin nodules affecting eye vision  Get DEXA done                         Health Maintenance   Topic Date Due   • SLP PLAN OF CARE  Never done   • COVID-19 Vaccine (3 - Moderna series) 04/17/2021   • Medicare Annual Wellness Visit (AWV)  06/13/2023   • Influenza Vaccine (1) 09/01/2023   • Hepatitis C Screening  11/29/2023 (Originally 1945)   • Depression Screening  02/20/2024   • Fall Risk  07/03/2024   • BMI: Adult  07/03/2024   • Pneumococcal Vaccine: 65+ Years  Completed   • HIB Vaccine  Aged Out   • IPV Vaccine  Aged Out   • Hepatitis A Vaccine  Aged Out   • Meningococcal ACWY Vaccine  Aged Out   • HPV Vaccine  Aged Out         Problem List as of 7/3/2023 Reviewed: 1/16/2023  9:00 AM by Tre Lane DO    Chronic kidney disease, stage 3a ([address])    Essential hypertension    Hypercalcemia    Nervousness    Nontoxic single thyroid nodule    Urinary stream slowing    Vitamin D deficiency         Subjective:   Chief Complaint   Patient presents with   • Hypertension   • Vitamin D Deficiency          • Medicare Wellness Visit     Patient is here today for 6-month recheck on his blood pressure and also for his AWV    He feels really well and is quite happy with that    No blood work or specialist seen since last visit      patient ID: Bill Caodaism is a 76 y.o. male. Patient's past medical history, surgical history, family history, social history, and Tobacco history reviewed with patient. MED LIST WAS REVIEWED AND UPDATED    ROS  As per HPI  Rest of 12 point review of systems negative     Objective:      VITALS:  Wt Readings from Last 3 Encounters:   07/03/23 73.6 kg (162 lb 4.8 oz)   02/20/23 75.8 kg (167 lb 3.2 oz)   01/16/23 74.8 kg (165 lb)     BP Readings from Last 3 Encounters:   07/03/23 124/80   02/20/23 134/82   01/16/23 144/90     Pulse Readings from Last 3 Encounters:   07/03/23 60   02/20/23 59   01/16/23 (!) 50     Body mass index is 23.62 kg/m². Laboratory Results:    All pertinent labs and studies were reviewed with patient during this office visit with highlights of the results contained in this note in the ASSESSMENT AND PLAN section       Physical Exam    Constitutional  Appears healthy, Looks well, Appearance consistent with age    Mental Status  Alert, Oriented, Cooperative, Memory function normal , clean, and reasonable    Neck  No neck mass, No thyromegaly, Good carotid upstrokes bilaterally, trachea midline positive click    Respiratory  Breath sounds normal, No rales, No rhonchi, No wheezing, normal palpation    Cardiac  Regular rhythm without ectopy or murmur no S3-S4, no heave lift or thrill to palpation    Vascular  No leg edema, No pedal edema    Muscular skeletal  No clubbing cyanosis , muscle tone normal    Skin  No appreciable rashes or abnormal appearing lesions

## 2023-07-03 NOTE — PATIENT INSTRUCTIONS
Recheck in 6 months or sooner if needed  Fasting blood work prior  See plastic surgery regarding skin nodules affecting eye vision  Get DEXA done              Medicare Preventive Visit Patient Instructions  Thank you for completing your Welcome to Medicare Visit or Medicare Annual Wellness Visit today. Your next wellness visit will be due in one year (7/3/2024). The screening/preventive services that you may require over the next 5-10 years are detailed below. Some tests may not apply to you based off risk factors and/or age. Screening tests ordered at today's visit but not completed yet may show as past due. Also, please note that scanned in results may not display below. Preventive Screenings:  Service Recommendations Previous Testing/Comments   Colorectal Cancer Screening  Colonoscopy    Fecal Occult Blood Test (FOBT)/Fecal Immunochemical Test (FIT)  Fecal DNA/Cologuard Test  Flexible Sigmoidoscopy Age: 43-73 years old   Colonoscopy: every 10 years (May be performed more frequently if at higher risk)  OR  FOBT/FIT: every 1 year  OR  Cologuard: every 3 years  OR  Sigmoidoscopy: every 5 years  Screening may be recommended earlier than age 39 if at higher risk for colorectal cancer. Also, an individualized decision between you and your healthcare provider will decide whether screening between the ages of 77-80 would be appropriate.  Colonoscopy: Not on file  FOBT/FIT: Not on file  Cologuard: Not on file  Sigmoidoscopy: Not on file          Prostate Cancer Screening Individualized decision between patient and health care provider in men between ages of 53-66   Medicare will cover every 12 months beginning on the day after your 50th birthday PSA: 1.3 ng/mL     Screening Not Indicated     Hepatitis C Screening Once for adults born between [address] [place] [address]  More frequently in patients at high risk for Hepatitis C Hep C Antibody: Not on file    Screening Current   Diabetes Screening 1-2 times per year if you're at risk for diabetes or have pre-diabetes Fasting glucose: 86 mg/dL (1/16/2023)  A1C: No results in last 5 years (No results in last 5 years)  Screening Current   Cholesterol Screening Once every 5 years if you don't have a lipid disorder. May order more often based on risk factors. Lipid panel: 01/16/2023  Screening Current      Other Preventive Screenings Covered by Medicare:  Abdominal Aortic Aneurysm (AAA) Screening: covered once if your at risk. You're considered to be at risk if you have a family history of AAA or a male between the age of 70-76 who smoking at least 100 cigarettes in your lifetime. Lung Cancer Screening: covers low dose CT scan once per year if you meet all of the following conditions: (1) Age 48-67; (2) No signs or symptoms of lung cancer; (3) Current smoker or have quit smoking within the last 15 years; (4) You have a tobacco smoking history of at least 20 pack years (packs per day x number of years you smoked); (5) You get a written order from a healthcare provider. Glaucoma Screening: covered annually if you're considered high risk: (1) You have diabetes OR (2) Family history of glaucoma OR (3)  aged 48 and older OR (3)  American aged 72 and older  Osteoporosis Screening: covered every 2 years if you meet one of the following conditions: (1) Have a vertebral abnormality; (2) On glucocorticoid therapy for more than 3 months; (3) Have primary hyperparathyroidism; (4) On osteoporosis medications and need to assess response to drug therapy. HIV Screening: covered annually if you're between the age of 14-79. Also covered annually if you are younger than 13 and older than 72 with risk factors for HIV infection. For pregnant patients, it is covered up to 3 times per pregnancy.     Immunizations:  Immunization Recommendations   Influenza Vaccine Annual influenza vaccination during flu season is recommended for all persons aged >= 6 months who do not have contraindications Pneumococcal Vaccine   * Pneumococcal conjugate vaccine = PCV13 (Prevnar 13), PCV15 (Vaxneuvance), PCV20 (Prevnar 20)  * Pneumococcal polysaccharide vaccine = PPSV23 (Pneumovax) Adults 2364 years old: 1-3 doses may be recommended based on certain risk factors  Adults 72 years old: 1-2 doses may be recommended based off what pneumonia vaccine you previously received   Hepatitis B Vaccine 3 dose series if at intermediate or high risk (ex: diabetes, end stage renal disease, liver disease)   Tetanus (Td) Vaccine - COST NOT COVERED BY MEDICARE PART B Following completion of primary series, a booster dose should be given every 10 years to maintain immunity against tetanus. Td may also be given as tetanus wound prophylaxis. Tdap Vaccine - COST NOT COVERED BY MEDICARE PART B Recommended at least once for all adults. For pregnant patients, recommended with each pregnancy. Shingles Vaccine (Shingrix) - COST NOT COVERED BY MEDICARE PART B  2 shot series recommended in those aged 48 and above     Health Maintenance Due:      Topic Date Due    Hepatitis C Screening  11/29/2023 (Originally 1945)     Immunizations Due:      Topic Date Due    COVID-19 Vaccine (3 - Moderna series) 04/17/2021    Influenza Vaccine (1) 09/01/2023     Advance Directives   What are advance directives? Advance directives are legal documents that state your wishes and plans for medical care. These plans are made ahead of time in case you lose your ability to make decisions for yourself. Advance directives can apply to any medical decision, such as the treatments you want, and if you want to donate organs. What are the types of advance directives? There are many types of advance directives, and each state has rules about how to use them. You may choose a combination of any of the following:  Living will: This is a written record of the treatment you want.  You can also choose which treatments you do not want, which to limit, and which to stop at a certain time. This includes surgery, medicine, IV fluid, and tube feedings. Durable power of  for healthcare [facility]): This is a written record that states who you want to make healthcare choices for you when you are unable to make them for yourself. This person, called a proxy, is usually a family member or a friend. You may choose more than 1 proxy. Do not resuscitate (DNR) order:  A DNR order is used in case your heart stops beating or you stop breathing. It is a request not to have certain forms of treatment, such as CPR. A DNR order may be included in other types of advance directives. Medical directive: This covers the care that you want if you are in a coma, near death, or unable to make decisions for yourself. You can list the treatments you want for each condition. Treatment may include pain medicine, surgery, blood transfusions, dialysis, IV or tube feedings, and a ventilator (breathing machine). Values history: This document has questions about your views, beliefs, and how you feel and think about life. This information can help others choose the care that you would choose. Why are advance directives important? An advance directive helps you control your care. Although spoken wishes may be used, it is better to have your wishes written down. Spoken wishes can be misunderstood, or not followed. Treatments may be given even if you do not want them. An advance directive may make it easier for your family to make difficult choices about your care. © Copyright [address] 2018 Information is for End User's use only and may not be sold, redistributed or otherwise used for commercial purposes.  All illustrations and images included in CareNotes® are the copyrighted property of [employer] or [employer]

## 2023-08-30 DIAGNOSIS — I10 ESSENTIAL HYPERTENSION: ICD-10-CM

## 2023-08-30 RX ORDER — CARVEDILOL 6.25 MG/1
TABLET ORAL
Qty: 180 TABLET | Refills: 1 | Status: SHIPPED | OUTPATIENT
Start: 2023-08-30

## 2023-09-05 ENCOUNTER — TELEPHONE (OUTPATIENT)
Dept: NEPHROLOGY | Facility: CLINIC | Age: 78
End: 2023-09-05

## 2023-09-05 NOTE — TELEPHONE ENCOUNTER
Called patient reminding to please complete labwork prior to 9/14 appointment with Dr. Joseph Del Cid.

## 2023-09-06 ENCOUNTER — COSMETIC (OUTPATIENT)
Dept: PLASTIC SURGERY | Facility: CLINIC | Age: 78
End: 2023-09-06

## 2023-09-06 DIAGNOSIS — L91.8 SKIN TAG: ICD-10-CM

## 2023-09-06 PROCEDURE — 99243 OFF/OP CNSLTJ NEW/EST LOW 30: CPT | Performed by: PHYSICIAN ASSISTANT

## 2023-09-07 ENCOUNTER — APPOINTMENT (OUTPATIENT)
Dept: LAB | Facility: CLINIC | Age: 78
End: 2023-09-07
Payer: COMMERCIAL

## 2023-09-07 DIAGNOSIS — L72.9 SCROTAL CYST: ICD-10-CM

## 2023-09-07 DIAGNOSIS — N18.31 CHRONIC KIDNEY DISEASE, STAGE 3A (HCC): ICD-10-CM

## 2023-09-07 DIAGNOSIS — I10 ESSENTIAL HYPERTENSION: ICD-10-CM

## 2023-09-07 LAB
ALBUMIN SERPL BCP-MCNC: 3.7 G/DL (ref 3.5–5)
ALP SERPL-CCNC: 62 U/L (ref 34–104)
ALT SERPL W P-5'-P-CCNC: 13 U/L (ref 7–52)
ANION GAP SERPL CALCULATED.3IONS-SCNC: 7 MMOL/L
AST SERPL W P-5'-P-CCNC: 18 U/L (ref 13–39)
BACTERIA UR QL AUTO: ABNORMAL /HPF
BILIRUB SERPL-MCNC: 0.49 MG/DL (ref 0.2–1)
BILIRUB UR QL STRIP: NEGATIVE
BUN SERPL-MCNC: 21 MG/DL (ref 5–25)
CALCIUM SERPL-MCNC: 9.8 MG/DL (ref 8.4–10.2)
CHLORIDE SERPL-SCNC: 108 MMOL/L (ref 96–108)
CLARITY UR: CLEAR
CO2 SERPL-SCNC: 27 MMOL/L (ref 21–32)
COLOR UR: ABNORMAL
CREAT SERPL-MCNC: 1.27 MG/DL (ref 0.6–1.3)
CREAT UR-MCNC: 109.7 MG/DL
ERYTHROCYTE [DISTWIDTH] IN BLOOD BY AUTOMATED COUNT: 13.1 % (ref 11.6–15.1)
GFR SERPL CREATININE-BSD FRML MDRD: 53 ML/MIN/1.73SQ M
GLUCOSE P FAST SERPL-MCNC: 91 MG/DL (ref 65–99)
GLUCOSE UR STRIP-MCNC: NEGATIVE MG/DL
HCT VFR BLD AUTO: 43.7 % (ref 36.5–49.3)
HGB BLD-MCNC: 14.6 G/DL (ref 12–17)
HGB UR QL STRIP.AUTO: NEGATIVE
HYALINE CASTS #/AREA URNS LPF: ABNORMAL /LPF
KETONES UR STRIP-MCNC: NEGATIVE MG/DL
LEUKOCYTE ESTERASE UR QL STRIP: NEGATIVE
MAGNESIUM SERPL-MCNC: 2.1 MG/DL (ref 1.9–2.7)
MCH RBC QN AUTO: 31.2 PG (ref 26.8–34.3)
MCHC RBC AUTO-ENTMCNC: 33.4 G/DL (ref 31.4–37.4)
MCV RBC AUTO: 93 FL (ref 82–98)
MUCOUS THREADS UR QL AUTO: ABNORMAL
NITRITE UR QL STRIP: NEGATIVE
NON-SQ EPI CELLS URNS QL MICRO: ABNORMAL /HPF
PH UR STRIP.AUTO: 6 [PH]
PHOSPHATE SERPL-MCNC: 2.5 MG/DL (ref 2.3–4.1)
PLATELET # BLD AUTO: 233 THOUSANDS/UL (ref 149–390)
PMV BLD AUTO: 11 FL (ref 8.9–12.7)
POTASSIUM SERPL-SCNC: 4.4 MMOL/L (ref 3.5–5.3)
PROT SERPL-MCNC: 6.2 G/DL (ref 6.4–8.4)
PROT UR STRIP-MCNC: NEGATIVE MG/DL
PROT UR-MCNC: 9 MG/DL
PROT/CREAT UR: 0.08 MG/G{CREAT} (ref 0–0.1)
PTH-INTACT SERPL-MCNC: 90.5 PG/ML (ref 12–88)
RBC # BLD AUTO: 4.68 MILLION/UL (ref 3.88–5.62)
RBC #/AREA URNS AUTO: ABNORMAL /HPF
SODIUM SERPL-SCNC: 142 MMOL/L (ref 135–147)
SP GR UR STRIP.AUTO: 1.01 (ref 1–1.03)
URATE SERPL-MCNC: 5.9 MG/DL (ref 3.5–8.5)
UROBILINOGEN UR STRIP-ACNC: <2 MG/DL
WBC # BLD AUTO: 6.52 THOUSAND/UL (ref 4.31–10.16)
WBC #/AREA URNS AUTO: ABNORMAL /HPF

## 2023-09-07 PROCEDURE — 84550 ASSAY OF BLOOD/URIC ACID: CPT

## 2023-09-07 PROCEDURE — 81001 URINALYSIS AUTO W/SCOPE: CPT

## 2023-09-07 PROCEDURE — 80053 COMPREHEN METABOLIC PANEL: CPT

## 2023-09-07 PROCEDURE — 84100 ASSAY OF PHOSPHORUS: CPT

## 2023-09-07 PROCEDURE — 83970 ASSAY OF PARATHORMONE: CPT

## 2023-09-07 PROCEDURE — 85027 COMPLETE CBC AUTOMATED: CPT

## 2023-09-07 PROCEDURE — 83735 ASSAY OF MAGNESIUM: CPT

## 2023-09-07 PROCEDURE — 84156 ASSAY OF PROTEIN URINE: CPT

## 2023-09-07 PROCEDURE — 36415 COLL VENOUS BLD VENIPUNCTURE: CPT

## 2023-09-07 PROCEDURE — 82570 ASSAY OF URINE CREATININE: CPT

## 2023-09-14 ENCOUNTER — OFFICE VISIT (OUTPATIENT)
Dept: NEPHROLOGY | Facility: HOSPITAL | Age: 78
End: 2023-09-14

## 2023-09-14 ENCOUNTER — HOSPITAL ENCOUNTER (OUTPATIENT)
Dept: BONE DENSITY | Facility: IMAGING CENTER | Age: 78
Discharge: HOME | End: 2023-09-14
Payer: COMMERCIAL

## 2023-09-14 VITALS
WEIGHT: 163 LBS | HEART RATE: 60 BPM | HEIGHT: 70 IN | SYSTOLIC BLOOD PRESSURE: 143 MMHG | DIASTOLIC BLOOD PRESSURE: 80 MMHG | BODY MASS INDEX: 23.34 KG/M2

## 2023-09-14 DIAGNOSIS — N18.31 STAGE 3A CHRONIC KIDNEY DISEASE (HCC): ICD-10-CM

## 2023-09-14 DIAGNOSIS — M85.80 OSTEOPENIA, UNSPECIFIED LOCATION: ICD-10-CM

## 2023-09-14 DIAGNOSIS — R35.1 BPH ASSOCIATED WITH NOCTURIA: Primary | ICD-10-CM

## 2023-09-14 DIAGNOSIS — N40.1 BPH ASSOCIATED WITH NOCTURIA: Primary | ICD-10-CM

## 2023-09-14 DIAGNOSIS — F41.9 ANXIETY: ICD-10-CM

## 2023-09-14 PROCEDURE — 77080 DXA BONE DENSITY AXIAL: CPT

## 2023-09-14 RX ORDER — TAMSULOSIN HYDROCHLORIDE 0.4 MG/1
0.4 CAPSULE ORAL
Qty: 30 CAPSULE | Refills: 3 | Status: SHIPPED | OUTPATIENT
Start: 2023-09-14

## 2023-09-14 NOTE — PROGRESS NOTES
OFFICE follow-up - Nephrology   Selina Lemus 76 y.o. male MRN: [id number]    Encounter: [id number]        ASSESSMENT & PLAN    1. Stage 3a chronic kidney disease  o Creatinine is currently stable  o His estimated GFR is likely around 50-65 mL/minute  o Urinalysis is bland  o Good urine output  o Avoiding nephrotoxic agent  o Etiology likely related to hypertensive arterial sclerosis/nephrosclerosis with 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  o Has some mild echogenicity and a few cysts  o Will repeat CMP, magnesium, phosphorus, check a urine protein to creatinine ratio, check a urinalysis  o Discussed with the patient chances of progression are low  o Will monitor for anemia and bone mineral disease  o Continue healthy diet    2. Hypertension-uncontrolled  o he was on lisinopril hydrochlorothiazide was becoming hypercalcemic so this was discontinued  o Now on losartan 50 mg twice daily and carvedilol 6.25 mg 2 times daily blood pressures are better controlled  o Continue low-sodium diet    3. Hypercalcemia  o This has improved since being off his thiazide diuretic will monitor    5. Risk reduction/clinical course  o Continue blood pressure control  o Continue diet and exercise    6. BPH with nocturia  · We will do a trial of Flomax to see if his symptoms improve      DISCUSSION/SUMMARY    Blood pressures are relatively well controlled  Follow-up on a yearly basis    HPI:  Selina Lemus is a 76 y.o.male who was referred by Iban Collins DO for evaluation of Follow-up and Chronic Kidney Disease    Here for evaluation he is a 77-year-old male with a past medical history of hypertension, nontoxic single thyroid nodule he has been doing relatively well he denies any acute chest pain or shortness of breath no fevers or chills he has been tolerating his medications without difficulty    ROS:    Review of Systems   Constitutional: Negative. HENT: Negative. Eyes: Negative. Respiratory: Negative. Cardiovascular: Negative. Gastrointestinal: Negative. Endocrine: Negative. Genitourinary: Negative. Musculoskeletal: Negative. Allergic/Immunologic: Negative. Neurological: Negative. Hematological: Negative. Psychiatric/Behavioral: Negative. All other systems reviewed and are negative. Allergies: Patient has no known allergies.     Medications:   Current Outpatient Medications:   •  ALPRAZolam (XANAX) 0.25 mg tablet, Take 1 tablet (0.25 mg total) by mouth once as needed for anxiety (prior to dental visit) for up to 1 dose per dental visit, Disp: 4 tablet, Rfl: 0  •  APPLE CIDER VINEGAR PO, Take 1 tablet by mouth in the morning, Disp: , Rfl:   •  Calcium Ascorbate (VITAMIN C) 500 mg tablet, Take 500 mg by mouth daily, Disp: , Rfl:   •  carvedilol (COREG) 6.25 mg tablet, TAKE 1 TABLET BY MOUTH TWICE A DAY WITH MEALS, Disp: 180 tablet, Rfl: 1  •  Cholecalciferol (Vitamin D3) 50 MCG (2000 UT) capsule, Take 1 capsule (2,000 Units total) by mouth daily, Disp: , Rfl: 0  •  Coenzyme Q10 (Co Q 10) 100 MG CAPS, Take 200 mg by mouth in the morning, Disp: , Rfl:   •  Flaxseed, Linseed, (FLAX SEED OIL PO), Take by mouth, Disp: , Rfl:   •  losartan (COZAAR) 50 mg tablet, TAKE 1 TABLET BY MOUTH TWICE A DAY, Disp: 180 tablet, Rfl: 1  •  Probiotic Product (PROBIOTIC DAILY PO), Take by mouth, Disp: , Rfl:   •  tamsulosin (FLOMAX) 0.4 mg, Take 1 capsule (0.4 mg total) by mouth daily with dinner, Disp: 30 capsule, Rfl: 3  •  TURMERIC PO, Take 1 tablet by mouth in the morning, Disp: , Rfl:     Past Medical History:   Diagnosis Date   • Abnormal weight loss     Last Assessed: 7/1/2013    • Atrial fibrillation ([address]) 10/03/2008    From Lyme Disease    • Bell's palsy 10/03/2008    Due to Lyme Disease    • Blurred vision     Last Assessed: 5/20/2014    • Calculus of salivary gland     Last Assessed: 3/18/2014    • Decreased body height 04/15/2010    Last Assessed: 2/10/2012    • GERD (gastroesophageal reflux disease) 1990   • Hypertension 05/20/2011    Last Assessed: 2/11/2013    • Lyme disease 01/27/2012    Last Assessed: 2/10/2012    • Lymphadenitis 05/20/2011   • Tachycardia     Last Assessed: 2/18/2013    • Testis mass 05/13/2010   • Tick bites     Last Assessed: 4/15/2017      Past Surgical History:   Procedure Laterality Date   • APPENDECTOMY  1955   • NO PAST SURGERIES     • US GUIDED THYROID BIOPSY  11/9/2021     Family History   Problem Relation Age of Onset   • Dementia Mother    • Uterine cancer Mother    • Heart disease Father         Cardiac Disorder    • Thyroid disease Father    • Dementia Father    • Drug abuse Brother    • Substance Abuse Brother    • Alcohol abuse Neg Hx       reports that he has quit smoking. His smoking use included cigarettes. He has a 2.50 pack-year smoking history. He has never used smokeless tobacco. He reports current alcohol use of about 1.0 standard drink of alcohol per week. He reports that he does not use drugs. OBJECTIVE:  /80 (BP Location: Right arm, Patient Position: Sitting, Cuff Size: Adult)   Pulse 60   Ht 5' 9.5" (1.765 m)   Wt 73.9 kg (163 lb)   BMI 23.73 kg/m²  Body mass index is 23.73 kg/m². Physical exam:  Physical Exam  Vitals and nursing note reviewed. Constitutional:       Appearance: Normal appearance. HENT:      Head: Normocephalic and atraumatic. Nose: Nose normal.      Mouth/Throat:      Mouth: Mucous membranes are moist.      Pharynx: Oropharynx is clear. Eyes:      Extraocular Movements: Extraocular movements intact. Conjunctiva/sclera: Conjunctivae normal.   Cardiovascular:      Rate and Rhythm: Normal rate and regular rhythm. Pulmonary:      Effort: Pulmonary effort is normal. No respiratory distress. Breath sounds: Normal breath sounds. Abdominal:      General: There is no distension. Musculoskeletal:         General: Normal range of motion. Cervical back: Normal range of motion.    Skin:     General: Skin is warm and dry.   Neurological:      General: No focal deficit present. Mental Status: He is alert and oriented to person, place, and time. Psychiatric:         Mood and Affect: Mood normal.         Behavior: Behavior normal.         Thought Content:  Thought content normal.         Judgment: Judgment normal.           Lab Results:     Results for orders placed or performed in visit on 09/07/23   Protein / creatinine ratio, urine   Result Value Ref Range    Creatinine, Ur 109.7 Reference range not established. mg/dL    Protein Urine Random 9 Reference range not established. mg/dL    Prot/Creat Ratio, Ur 0.08 0.00 - 0.10   Urinalysis with microscopic   Result Value Ref Range    Color, UA Light Yellow     Clarity, UA Clear     Specific Gravity, UA 1.014 1.003 - 1.030    pH, UA 6.0 4.5, 5.0, 5.5, 6.0, 6.5, 7.0, 7.5, 8.0    Leukocytes, UA Negative Negative    Nitrite, UA Negative Negative    Protein, UA Negative Negative mg/dl    Glucose, UA Negative Negative mg/dl    Ketones, UA Negative Negative mg/dl    Urobilinogen, UA <2.0 <2.0 mg/dl mg/dl    Bilirubin, UA Negative Negative    Occult Blood, UA Negative Negative    RBC, UA None Seen None Seen, 1-2 /hpf    WBC, UA None Seen None Seen, 1-2 /hpf    Epithelial Cells Occasional None Seen, Occasional /hpf    Bacteria, UA None Seen None Seen, Occasional /hpf    MUCUS THREADS Occasional (A) None Seen    Hyaline Casts, UA 0-3 (A) None Seen /lpf   Uric acid   Result Value Ref Range    Uric Acid 5.9 3.5 - 8.5 mg/dL   CBC and Platelet   Result Value Ref Range    WBC 6.52 4.31 - 10.16 Thousand/uL    RBC 4.68 3.88 - 5.62 Million/uL    Hemoglobin 14.6 12.0 - 17.0 g/dL    Hematocrit 43.7 36.5 - 49.3 %    MCV 93 82 - 98 fL    MCH 31.2 26.8 - 34.3 pg    MCHC 33.4 31.4 - 37.4 g/dL    RDW 13.1 11.6 - 15.1 %    Platelets 138 550 - 216 Thousands/uL    MPV 11.0 8.9 - 12.7 fL   PTH, intact   Result Value Ref Range    PTH 90.5 (H) 12.0 - 88.0 pg/mL   Phosphorus   Result Value Ref Range Phosphorus 2.5 2.3 - 4.1 mg/dL   Magnesium   Result Value Ref Range    Magnesium 2.1 1.9 - 2.7 mg/dL   Comprehensive metabolic panel   Result Value Ref Range    Sodium 142 135 - 147 mmol/L    Potassium 4.4 3.5 - 5.3 mmol/L    Chloride 108 96 - 108 mmol/L    CO2 27 21 - 32 mmol/L    ANION GAP 7 mmol/L    BUN 21 5 - 25 mg/dL    Creatinine 1.27 0.60 - 1.30 mg/dL    Glucose, Fasting 91 65 - 99 mg/dL    Calcium 9.8 8.4 - 10.2 mg/dL    AST 18 13 - 39 U/L    ALT 13 7 - 52 U/L    Alkaline Phosphatase 62 34 - 104 U/L    Total Protein 6.2 (L) 6.4 - 8.4 g/dL    Albumin 3.7 3.5 - 5.0 g/dL    Total Bilirubin 0.49 0.20 - 1.00 mg/dL    eGFR 53 ml/min/1.73sq m         DISCUSSION:    There are no Patient Instructions on file for this visit. Portions of the record may have been created with voice recognition software. Occasional wrong word or "sound a like" substitutions may have occurred due to the inherent limitations of voice recognition software. Read the chart carefully and recognize, using context, where substitutions have occurred. If you have any questions, please contact the dictating provider.

## 2023-09-17 RX ORDER — ALPRAZOLAM 0.25 MG/1
0.25 TABLET ORAL ONCE AS NEEDED
Qty: 4 TABLET | Refills: 0 | Status: SHIPPED | OUTPATIENT
Start: 2023-09-17

## 2023-09-18 ENCOUNTER — TELEPHONE (OUTPATIENT)
Dept: FAMILY MEDICINE CLINIC | Facility: CLINIC | Age: 78
End: 2023-09-18

## 2023-09-18 NOTE — TELEPHONE ENCOUNTER
----- Message from Aaron Giles DO sent at 9/18/2023 12:27 PM EDT -----  Patient needs a 15-minute appointment to discuss osteopenia      Left message to call back.

## 2023-10-08 DIAGNOSIS — R35.1 BPH ASSOCIATED WITH NOCTURIA: ICD-10-CM

## 2023-10-08 DIAGNOSIS — N40.1 BPH ASSOCIATED WITH NOCTURIA: ICD-10-CM

## 2023-10-09 RX ORDER — TAMSULOSIN HYDROCHLORIDE 0.4 MG/1
0.4 CAPSULE ORAL
Qty: 90 CAPSULE | Refills: 2 | Status: SHIPPED | OUTPATIENT
Start: 2023-10-09

## 2023-10-16 ENCOUNTER — OFFICE VISIT (OUTPATIENT)
Dept: FAMILY MEDICINE CLINIC | Facility: CLINIC | Age: 78
End: 2023-10-16
Payer: COMMERCIAL

## 2023-10-16 VITALS
DIASTOLIC BLOOD PRESSURE: 80 MMHG | HEIGHT: 70 IN | OXYGEN SATURATION: 96 % | TEMPERATURE: 98.2 F | HEART RATE: 56 BPM | RESPIRATION RATE: 16 BRPM | WEIGHT: 168 LBS | BODY MASS INDEX: 24.05 KG/M2 | SYSTOLIC BLOOD PRESSURE: 130 MMHG

## 2023-10-16 DIAGNOSIS — M85.89 OSTEOPENIA OF MULTIPLE SITES: Primary | ICD-10-CM

## 2023-10-16 DIAGNOSIS — D36.7 FACIAL BENIGN NEOPLASM: ICD-10-CM

## 2023-10-16 PROCEDURE — 99214 OFFICE O/P EST MOD 30 MIN: CPT | Performed by: FAMILY MEDICINE

## 2023-10-16 RX ORDER — ALENDRONATE SODIUM 70 MG/1
70 TABLET ORAL
Qty: 12 TABLET | Refills: 3 | Status: SHIPPED | OUTPATIENT
Start: 2023-10-16

## 2023-10-16 NOTE — PROGRESS NOTES
Assessment/Plan:    Osteopenia  Patient agrees to take alendronate calcium and vitamin D  He already exercises and is already on vitamin D  We will recheck his DEXA in 2 years      Depression Screening and Follow-up Plan: Patient was screened for depression during today's encounter. They screened negative with a PHQ-2 score of 0. Subjective:   Gregg Shin is a 78 y.o.male  Chief Complaint   Patient presents with    osteopenia      Wants to discuss osteopenia      Patient is here at my request to discuss his DEXA        Past medical history, social history, and family history reviewed as appropriate for the complaint of this patient. MEDICATIONS REVIEWED AND UPDATED    10 point review of systems performed, the remainder of the ROS is negative except for what is noted in the history of chief complaint    Objective:    Vitals:    10/16/23 1425   BP: 130/80   Pulse: 56   Resp: 16   Temp: 98.2 °F (36.8 °C)   SpO2: 96%     Body mass index is 24.45 kg/m². Physical Exam    General  Patient in no acute distress, well appearing, well nourished and appears stated age    Mental status  Good judgment and insight, oriented to time person and place, recent and remote memory is intact, mood and affect are normal, cooperative, and patient is reasonable.

## 2023-10-16 NOTE — PATIENT INSTRUCTIONS
Take alendronate 70 mg once a week in the morning before you eat and wait 30 minutes  Take it with 8 ounces of water    Daily take your vitamin D every day as you are    Daily take 2 Tums with food to get the calcium and    Daily continue to walk briskly at least 20 minutes    We will check this again in 2 years

## 2023-10-18 ENCOUNTER — TELEPHONE (OUTPATIENT)
Age: 78
End: 2023-10-18

## 2023-10-18 NOTE — TELEPHONE ENCOUNTER
Patient called this morning due to a bill statement he received for $246.00 for his visit on 2023. Patient would like to know why he received a bill when no procedure was done during the visit and he said he paid out of pocket for visit. I provided our billing department phone number [phone] and advised to call and see why he was charged that amount. I also said I would send this to the supervisor to review. Appointment Instructions   Visit Type: COSMETIC CONSULT PG   Patient Instructions: COSMETIC CONSULT PG  Please arrive 15 minutes before appointment time   Dept directions for [facility] [place]:  [facility] [place] [address] [place] [address], [place], [place], [address] [place] [address]      Appointment Orders   Order # Category Procedure Appointment Association   [[email or web address]] Outpatient Referral Orderables Ambulatory referral to Plastic Surgery [REF89] Linked   Appointment Question Answers   Answers Entered: Tue Sep 5, 2023 9:46 AM By: Kavon Baltazar [[id number]]   [facility]   Have you been seen by an [address] [place] [address] Surgeon? No    What is your reason for visit? Face and Neck    What type of face or neck issue is the patient having? Other    Patient Demographics for [facility]" [[id number]]   : 1945 SSN: [id number]   Age: 78 yrs Sex: Male   Home Phone: [phone] Work Phone:     Address: [facility] E-Mail: [email or web address]   City/State/[address] [address]   Marielena Sereneasad Comments:     Payment History   Date Source Amount   User Additional Information   2023 Cash -150.00   Magali Prime     2023 Cash 150.00   Magali Prime     2023 Champion -30.00   KATHY GRIMM     2023 Cash 30.00   Magali Prime

## 2023-12-22 DIAGNOSIS — I10 ESSENTIAL HYPERTENSION: ICD-10-CM

## 2023-12-26 RX ORDER — LOSARTAN POTASSIUM 50 MG/1
TABLET ORAL
Qty: 180 TABLET | Refills: 1 | Status: SHIPPED | OUTPATIENT
Start: 2023-12-26

## 2024-01-11 ENCOUNTER — OFFICE VISIT (OUTPATIENT)
Dept: FAMILY MEDICINE CLINIC | Facility: CLINIC | Age: 79
End: 2024-01-11
Payer: COMMERCIAL

## 2024-01-11 VITALS
TEMPERATURE: 98.7 F | BODY MASS INDEX: 24.22 KG/M2 | WEIGHT: 166.4 LBS | HEART RATE: 84 BPM | DIASTOLIC BLOOD PRESSURE: 82 MMHG | SYSTOLIC BLOOD PRESSURE: 124 MMHG

## 2024-01-11 DIAGNOSIS — N18.31 CHRONIC KIDNEY DISEASE, STAGE 3A (HCC): ICD-10-CM

## 2024-01-11 DIAGNOSIS — E04.1 NONTOXIC SINGLE THYROID NODULE: ICD-10-CM

## 2024-01-11 DIAGNOSIS — E83.52 HYPERCALCEMIA: ICD-10-CM

## 2024-01-11 DIAGNOSIS — R39.198 URINARY STREAM SLOWING: ICD-10-CM

## 2024-01-11 DIAGNOSIS — E55.9 VITAMIN D DEFICIENCY: ICD-10-CM

## 2024-01-11 DIAGNOSIS — I10 ESSENTIAL HYPERTENSION: Primary | ICD-10-CM

## 2024-01-11 PROBLEM — L91.8 SKIN TAG: Status: RESOLVED | Noted: 2023-09-06 | Resolved: 2024-01-11

## 2024-01-11 PROCEDURE — 93000 ELECTROCARDIOGRAM COMPLETE: CPT | Performed by: FAMILY MEDICINE

## 2024-01-11 PROCEDURE — 99214 OFFICE O/P EST MOD 30 MIN: CPT | Performed by: FAMILY MEDICINE

## 2024-01-11 RX ORDER — CARVEDILOL 6.25 MG/1
6.25 TABLET ORAL 2 TIMES DAILY WITH MEALS
Qty: 180 TABLET | Refills: 1 | Status: SHIPPED | OUTPATIENT
Start: 2024-01-11

## 2024-01-11 RX ORDER — LOSARTAN POTASSIUM 50 MG/1
50 TABLET ORAL 2 TIMES DAILY
Qty: 180 TABLET | Refills: 1 | Status: SHIPPED | OUTPATIENT
Start: 2024-01-11

## 2024-01-11 NOTE — PROGRESS NOTES
ASSESSMENT/PLAN: Full PE done today    Hypertension  Blood pressure excellent 124/82  Continue losartan and Coreg  Patient read that losartan was causing cancers, I assured him that I heard nothing of this and if it was the case it would be pulled from the market straightaway    General anxiety disorder  No medicine at this time  Stopped Lexapro and has been fine since that time      Chronic kidney disease stage III AAA   GFR 53  Continue to watch this at least every 6 months  Also follows with nephrology     Thyroid nodules  2 thyroids biopsies  Both scant cells  Cells they do have are benign     Hypercalcemia  Calcium is controlled at this time and being watched by nephrology     Vitamin D deficiency  Patient is taking vitamin D daily 2000 units  We will check vitamin D level     Urinary stream slowing  Secondary to BPH  Patient is not on Flomax because now he has no problem with his stream    Osteopenia  Continue alendronate calcium and D  Next DEXA 2025        Recheck in 6 months or sooner if needed  Do his blood work today or within this week                  Health Maintenance   Topic Date Due    SLP PLAN OF CARE  Never done    COVID-19 Vaccine (3 - 2023-24 season) 04/11/2024 (Originally 9/1/2023)    Influenza Vaccine (1) 06/30/2024 (Originally 9/1/2023)    Zoster Vaccine (1 of 2) 01/07/2025 (Originally 10/1/1993)    Fall Risk  07/03/2024    Medicare Annual Wellness Visit (AWV)  07/03/2024    Depression Screening  10/16/2024    Pneumococcal Vaccine: 65+ Years  Completed    HIB Vaccine  Aged Out    IPV Vaccine  Aged Out    Hepatitis A Vaccine  Aged Out    Meningococcal ACWY Vaccine  Aged Out    HPV Vaccine  Aged Out         Problem List as of 1/11/2024 Reviewed: 10/16/2023  2:39 PM by Juanita Berry DO      Chronic kidney disease, stage 3a (HCC)    Essential hypertension    Hypercalcemia    Nervousness    Nontoxic single thyroid nodule    Skin tag    Urinary stream slowing    Vitamin D deficiency          Subjective:   Chief Complaint   Patient presents with    Vitamin D Deficiency    Hypertension     Patient is here for his recheck and his physical  He saw nephrology since last visit and things are holding their own and even improving a bit  Also had his EKG done and read by me today        patient ID: Ang Triana is a 78 y.o. male.    Patient's past medical history, surgical history, family history, social history, and Tobacco history reviewed with patient.     MED LIST WAS REVIEWED AND UPDATED    ROS  As per HPI  Rest of 12 point review of systems negative     Objective:      VITALS:  Wt Readings from Last 3 Encounters:   01/11/24 75.5 kg (166 lb 6.4 oz)   10/16/23 76.2 kg (168 lb)   09/14/23 73.9 kg (163 lb)     BP Readings from Last 3 Encounters:   01/11/24 124/82   10/16/23 130/80   09/14/23 143/80     Pulse Readings from Last 3 Encounters:   01/11/24 84   10/16/23 56   09/14/23 60     Body mass index is 24.22 kg/m².    Laboratory Results:   All pertinent labs and studies were reviewed with patient during this office visit with highlights of the results contained in this note in the ASSESSMENT AND PLAN section       Physical Exam      Gen.  No acute distress well-appearing well-nourished appears stated age    Mental status  Good judgment and insight oriented to time person and place, recent and remote memory intact mood and affect normal cooperative and patient is reasonable    HEENT  PERRLA 3 mm, EOMI without nystagmus, normocephalic atraumatic without facial weakness    Neck   supple no masses trachea midline positive click normal carotid upstrokes with no bruits    Cor  Regular rhythm without ectopy or murmur, no S3-S4, normal palpation that is no heave lift or thrill    Vascular  No edema, good pedal pulses    Lungs  CTA bilaterally in no respiratory distress no wheezes rhonchi or rales, normal to palpation no tactile fremitus    Abdomen  Soft, no palpable masses, no hepatosplenomegaly, normal  bowel sounds, nontender    Lymphatics  No palpable nodes in the neck, supraclavicular area, axilla, or groin    Musculoskeletal  No clubbing cyanosis or edema muscle tone normal    Skin  no rashes or abnormal appearing lesions    Neuro  Normal ambulation, cranial nerves 2-12 grossly intact, higher functioning with reasoning intact.

## 2024-01-11 NOTE — PATIENT INSTRUCTIONS
Please schedule the ultrasound of your thyroid    Please take your 2 medicines for blood pressure because that is why they are down  If I see anything about losartan causing cancer I will let you know    See you in 6 months or sooner if needed  We will only call you if there is something we need to deal with regarding your blood work

## 2024-06-19 ENCOUNTER — APPOINTMENT (OUTPATIENT)
Dept: LAB | Facility: CLINIC | Age: 79
End: 2024-06-19
Payer: COMMERCIAL

## 2024-06-19 DIAGNOSIS — E55.9 VITAMIN D DEFICIENCY: ICD-10-CM

## 2024-06-19 LAB — 25(OH)D3 SERPL-MCNC: 44.6 NG/ML (ref 30–100)

## 2024-06-19 PROCEDURE — 36415 COLL VENOUS BLD VENIPUNCTURE: CPT

## 2024-06-19 PROCEDURE — 82306 VITAMIN D 25 HYDROXY: CPT

## 2024-07-18 DIAGNOSIS — Z12.11 SCREENING FOR COLON CANCER: Primary | ICD-10-CM

## 2024-09-04 ENCOUNTER — RA CDI HCC (OUTPATIENT)
Dept: OTHER | Facility: HOSPITAL | Age: 79
End: 2024-09-04

## 2024-09-06 ENCOUNTER — APPOINTMENT (OUTPATIENT)
Dept: LAB | Facility: CLINIC | Age: 79
End: 2024-09-06
Payer: COMMERCIAL

## 2024-09-06 DIAGNOSIS — N18.31 STAGE 3A CHRONIC KIDNEY DISEASE (HCC): ICD-10-CM

## 2024-09-06 DIAGNOSIS — R35.1 BPH ASSOCIATED WITH NOCTURIA: ICD-10-CM

## 2024-09-06 DIAGNOSIS — N40.1 BPH ASSOCIATED WITH NOCTURIA: ICD-10-CM

## 2024-09-06 LAB
ANION GAP SERPL CALCULATED.3IONS-SCNC: 7 MMOL/L (ref 4–13)
BACTERIA UR QL AUTO: ABNORMAL /HPF
BILIRUB UR QL STRIP: NEGATIVE
BUN SERPL-MCNC: 20 MG/DL (ref 5–25)
CALCIUM SERPL-MCNC: 9.7 MG/DL (ref 8.4–10.2)
CHLORIDE SERPL-SCNC: 108 MMOL/L (ref 96–108)
CLARITY UR: CLEAR
CO2 SERPL-SCNC: 27 MMOL/L (ref 21–32)
COLOR UR: YELLOW
CREAT SERPL-MCNC: 1.34 MG/DL (ref 0.6–1.3)
CREAT UR-MCNC: 116.7 MG/DL
ERYTHROCYTE [DISTWIDTH] IN BLOOD BY AUTOMATED COUNT: 13.1 % (ref 11.6–15.1)
GFR SERPL CREATININE-BSD FRML MDRD: 49 ML/MIN/1.73SQ M
GLUCOSE P FAST SERPL-MCNC: 80 MG/DL (ref 65–99)
GLUCOSE UR STRIP-MCNC: NEGATIVE MG/DL
HCT VFR BLD AUTO: 44.3 % (ref 36.5–49.3)
HGB BLD-MCNC: 14.4 G/DL (ref 12–17)
HGB UR QL STRIP.AUTO: NEGATIVE
KETONES UR STRIP-MCNC: NEGATIVE MG/DL
LEUKOCYTE ESTERASE UR QL STRIP: NEGATIVE
MAGNESIUM SERPL-MCNC: 2.2 MG/DL (ref 1.9–2.7)
MCH RBC QN AUTO: 30.8 PG (ref 26.8–34.3)
MCHC RBC AUTO-ENTMCNC: 32.5 G/DL (ref 31.4–37.4)
MCV RBC AUTO: 95 FL (ref 82–98)
MICROALBUMIN UR-MCNC: 11.5 MG/L
MICROALBUMIN/CREAT 24H UR: 10 MG/G CREATININE (ref 0–30)
MUCOUS THREADS UR QL AUTO: ABNORMAL
NITRITE UR QL STRIP: NEGATIVE
NON-SQ EPI CELLS URNS QL MICRO: ABNORMAL /HPF
PH UR STRIP.AUTO: 6 [PH]
PHOSPHATE SERPL-MCNC: 2.2 MG/DL (ref 2.3–4.1)
PLATELET # BLD AUTO: 237 THOUSANDS/UL (ref 149–390)
PMV BLD AUTO: 11 FL (ref 8.9–12.7)
POTASSIUM SERPL-SCNC: 4 MMOL/L (ref 3.5–5.3)
PROT UR STRIP-MCNC: NEGATIVE MG/DL
PTH-INTACT SERPL-MCNC: 85.6 PG/ML (ref 12–88)
RBC # BLD AUTO: 4.68 MILLION/UL (ref 3.88–5.62)
RBC #/AREA URNS AUTO: ABNORMAL /HPF
SODIUM SERPL-SCNC: 142 MMOL/L (ref 135–147)
SP GR UR STRIP.AUTO: 1.02 (ref 1–1.03)
URATE SERPL-MCNC: 5.4 MG/DL (ref 3.5–8.5)
UROBILINOGEN UR STRIP-ACNC: <2 MG/DL
WBC # BLD AUTO: 7.05 THOUSAND/UL (ref 4.31–10.16)
WBC #/AREA URNS AUTO: ABNORMAL /HPF

## 2024-09-06 PROCEDURE — 83970 ASSAY OF PARATHORMONE: CPT

## 2024-09-06 PROCEDURE — 85027 COMPLETE CBC AUTOMATED: CPT

## 2024-09-06 PROCEDURE — 36415 COLL VENOUS BLD VENIPUNCTURE: CPT

## 2024-09-06 PROCEDURE — 83735 ASSAY OF MAGNESIUM: CPT

## 2024-09-06 PROCEDURE — 84550 ASSAY OF BLOOD/URIC ACID: CPT

## 2024-09-06 PROCEDURE — 80048 BASIC METABOLIC PNL TOTAL CA: CPT

## 2024-09-06 PROCEDURE — 82043 UR ALBUMIN QUANTITATIVE: CPT

## 2024-09-06 PROCEDURE — 81001 URINALYSIS AUTO W/SCOPE: CPT

## 2024-09-06 PROCEDURE — 82570 ASSAY OF URINE CREATININE: CPT

## 2024-09-06 PROCEDURE — 84100 ASSAY OF PHOSPHORUS: CPT

## 2024-09-12 NOTE — PROGRESS NOTES
NEPHROLOGY OFFICE VISIT   Ang Triana 78 y.o. male MRN: [id number]  2024    Reason for Visit: Follow up    INTERVAL HISTORY and SUBJECTIVE:    I had the pleasure of seeing Uziel today in the renal clinic. He is a 78-year-old male who follows with Dr. Momin for management of CKD stage IIIa.  He was last seen in the nephrology clinic 2023.  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  Kidneys symmetric and normal size.  Mildly echogenic renal cortex.  No hydronephrosis.  Prostate was noted to be enlarged.  Bladder diverticulum noted mildly trabeculated bladder wall noted.  Scattered bilateral renal cysts  Recommendations:   Propensity for progression of disease appears to be low.  Control modifiable risk factors  Renal function stable.  Follow-up in 1 year    Electrolytes/acid-base: Acceptable    Hypertension:  History of uncontrolled hypertension  Volume status: Euvolemic  Recent readings 120's/80  Medications: Previously on HCTZ but became hypercalcemic therefore thiazide diuretic discontinued  Current medications: Losartan 50 mg twice a day and carvedilol 6.25 mg twice a day  Diet control/low-sodium diet  No changes at this time    Mineral bone disease  Magnesium normal, 2.2  PTH normal 85.6.  Previous PTH levels were mildly elevated at 90.5 and 112.1  Vitamin D level normal 44.6  Phosphorus slightly below normal, 2.2.  Prior phosphorus levels within normal limits.  Current increase of phosphorus foods    BPH with nocturia  Enlarged prostate noted on renal  "ultrasound  Last PSA normal with no significant change from prior levels  Referred to urology  Refuses to take Flomax.  Would like to embrace herbal supplement.  Inquiring about stinging nettle/supplement    PATIENT INSTRUCTIONS:    Patient Instructions   Thank you for the kind visit>  You have stable chronic kidney disease.    Recommendations:  Follow-up in 1 year  Blood work and urine studies prior to the appointment  Avoid NSAIDs such as Motrin Aleve ibuprofen and Advil  You can take Tylenol for control of pain  If you have a concern about the safety of medication call our office or asked the pharmacist  Follow up with PCP and possibly urology regarding prostate/urinary frequency        Orders Placed This Encounter   Procedures    Albumin / creatinine urine ratio     Standing Status:   Future     Standing Expiration Date:   12/1/2025    CBC     Standing Status:   Future     Number of Occurrences:   1     Standing Expiration Date:   12/1/2025    Magnesium     Standing Status:   Future     Number of Occurrences:   1     Standing Expiration Date:   12/1/2025    Renal function panel     Standing Status:   Future     Number of Occurrences:   1     Standing Expiration Date:   12/1/2025    PTH, intact     Standing Status:   Future     Number of Occurrences:   1     Standing Expiration Date:   12/1/2025    Urinalysis with microscopic     Standing Status:   Future     Number of Occurrences:   1     Standing Expiration Date:   12/1/2025       OBJECTIVE:  Current Weight: Weight - Scale: 73.9 kg (163 lb)  Vitals:    09/13/24 0850 09/13/24 0909   BP:  138/84   BP Location:  Left arm   Patient Position:  Sitting   Cuff Size:  Standard   Pulse:  74   Weight: 73.9 kg (163 lb)    Height: 5' 9.5\" (1.765 m)     Body mass index is 23.73 kg/m².      REVIEW OF SYSTEMS:    Review of Systems   Constitutional:  Negative for activity change, appetite change, chills, fatigue, fever and unexpected weight change.   HENT:  Negative for ear " pain and sore throat.    Eyes:  Negative for visual disturbance.   Respiratory:  Negative for cough and shortness of breath.    Cardiovascular:  Negative for chest pain, palpitations and leg swelling.   Gastrointestinal:  Negative for abdominal pain, constipation, diarrhea, nausea and vomiting.   Endocrine: Negative.    Genitourinary:  Positive for difficulty urinating and frequency. Negative for dysuria and hematuria.   Musculoskeletal:  Negative for arthralgias and back pain.   Skin:  Negative for color change and rash.   Allergic/Immunologic: Negative.    Neurological:  Negative for dizziness, seizures, syncope, weakness, light-headedness, numbness and headaches.   Hematological:  Does not bruise/bleed easily.   Psychiatric/Behavioral:  The patient is not nervous/anxious.    All other systems reviewed and are negative.      PHYSICAL EXAM:      Physical Exam  Constitutional:       General: He is not in acute distress.     Appearance: Normal appearance. He is well-developed. He is not ill-appearing, toxic-appearing or diaphoretic.   HENT:      Head: Normocephalic and atraumatic.      Nose: Nose normal. No congestion.      Mouth/Throat:      Mouth: Mucous membranes are moist.   Eyes:      Extraocular Movements: Extraocular movements intact.      Conjunctiva/sclera: Conjunctivae normal.   Neck:      Thyroid: No thyromegaly.      Vascular: No carotid bruit or JVD.      Trachea: No tracheal deviation.   Cardiovascular:      Rate and Rhythm: Normal rate and regular rhythm.      Heart sounds: No murmur heard.     No friction rub. No gallop.   Pulmonary:      Effort: Pulmonary effort is normal.      Breath sounds: Normal breath sounds.   Abdominal:      General: Bowel sounds are normal. There is no distension.      Palpations: Abdomen is soft. There is no mass.      Tenderness: There is no abdominal tenderness. There is no guarding or rebound.   Musculoskeletal:         General: No tenderness or signs of injury. Normal  range of motion.      Cervical back: Normal range of motion and neck supple. No rigidity or tenderness.      Right lower leg: No edema.      Left lower leg: No edema.   Skin:     General: Skin is warm and dry.      Capillary Refill: Capillary refill takes less than 2 seconds.      Coloration: Skin is not jaundiced or pale.      Findings: No erythema or rash.   Neurological:      Mental Status: He is alert and oriented to person, place, and time.   Psychiatric:         Mood and Affect: Mood normal.         Behavior: Behavior normal.         Thought Content: Thought content normal.         Judgment: Judgment normal.         Medications:    Current Outpatient Medications:     APPLE CIDER VINEGAR PO, Take 1 tablet by mouth in the morning, Disp: , Rfl:     b complex vitamins capsule, Take 1 capsule by mouth daily, Disp: , Rfl:     Calcium Ascorbate (VITAMIN C) 500 mg tablet, Take 500 mg by mouth daily, Disp: , Rfl:     carvedilol (COREG) 6.25 mg tablet, Take 1 tablet (6.25 mg total) by mouth 2 (two) times a day with meals, Disp: 180 tablet, Rfl: 1    Cholecalciferol (Vitamin D3) 50 MCG (2000 UT) capsule, Take 1 capsule (2,000 Units total) by mouth daily, Disp: , Rfl: 0    Flaxseed, Linseed, (FLAX SEED OIL PO), Take by mouth, Disp: , Rfl:     losartan (COZAAR) 50 mg tablet, Take 1 tablet (50 mg total) by mouth 2 (two) times a day, Disp: 180 tablet, Rfl: 1    magnesium (MAGTAB) 84 MG (7MEQ) TBCR, Take 84 mg by mouth daily, Disp: , Rfl:     zinc gluconate 50 mg tablet, Take 50 mg by mouth daily, Disp: , Rfl:     alendronate (Fosamax) 70 mg tablet, Take 1 tablet (70 mg total) by mouth every 7 days (Patient not taking: Reported on 9/13/2024), Disp: 12 tablet, Rfl: 3    ALPRAZolam (XANAX) 0.25 mg tablet, Take 1 tablet (0.25 mg total) by mouth once as needed for anxiety (prior to dental visit) for up to 1 dose per dental visit (Patient not taking: Reported on 9/13/2024), Disp: 4 tablet, Rfl: 0    calcium carbonate (TUMS ULTRA)  "1000 MG chewable tablet, Chew 1 tablet (1,000 mg total) 2 (two) times a day (Patient not taking: Reported on 9/13/2024), Disp: , Rfl: 0    Laboratory Results:        Invalid input(s): \"ALBUMIN\"    Results for orders placed or performed in visit on 09/06/24   Magnesium   Result Value Ref Range    Magnesium 2.2 1.9 - 2.7 mg/dL   Uric acid   Result Value Ref Range    Uric Acid 5.4 3.5 - 8.5 mg/dL   Phosphorus   Result Value Ref Range    Phosphorus 2.2 (L) 2.3 - 4.1 mg/dL   PTH, intact   Result Value Ref Range    PTH 85.6 12.0 - 88.0 pg/mL   Urinalysis with microscopic   Result Value Ref Range    Color, UA Yellow     Clarity, UA Clear     Specific Gravity, UA 1.018 1.003 - 1.030    pH, UA 6.0 4.5, 5.0, 5.5, 6.0, 6.5, 7.0, 7.5, 8.0    Leukocytes, UA Negative Negative    Nitrite, UA Negative Negative    Protein, UA Negative Negative mg/dl    Glucose, UA Negative Negative mg/dl    Ketones, UA Negative Negative mg/dl    Urobilinogen, UA <2.0 <2.0 mg/dl mg/dl    Bilirubin, UA Negative Negative    Occult Blood, UA Negative Negative    RBC, UA None Seen None Seen, 1-2 /hpf    WBC, UA None Seen None Seen, 1-2 /hpf    Epithelial Cells None Seen None Seen, Occasional /hpf    Bacteria, UA None Seen None Seen, Occasional /hpf    MUCUS THREADS Occasional (A) None Seen   CBC and Platelet   Result Value Ref Range    WBC 7.05 4.31 - 10.16 Thousand/uL    RBC 4.68 3.88 - 5.62 Million/uL    Hemoglobin 14.4 12.0 - 17.0 g/dL    Hematocrit 44.3 36.5 - 49.3 %    MCV 95 82 - 98 fL    MCH 30.8 26.8 - 34.3 pg    MCHC 32.5 31.4 - 37.4 g/dL    RDW 13.1 11.6 - 15.1 %    Platelets 237 149 - 390 Thousands/uL    MPV 11.0 8.9 - 12.7 fL   Basic metabolic panel   Result Value Ref Range    Sodium 142 135 - 147 mmol/L    Potassium 4.0 3.5 - 5.3 mmol/L    Chloride 108 96 - 108 mmol/L    CO2 27 21 - 32 mmol/L    ANION GAP 7 4 - 13 mmol/L    BUN 20 5 - 25 mg/dL    Creatinine 1.34 (H) 0.60 - 1.30 mg/dL    Glucose, Fasting 80 65 - 99 mg/dL    Calcium 9.7 8.4 - " 10.2 mg/dL    eGFR 49 ml/min/1.73sq m   Albumin / creatinine urine ratio   Result Value Ref Range    Creatinine, Ur 116.7 Reference range not established. mg/dL    Albumin,U,Random 11.5 <20.0 mg/L    Albumin Creat Ratio 10 0 - 30 mg/g creatinine

## 2024-09-13 ENCOUNTER — OFFICE VISIT (OUTPATIENT)
Dept: NEPHROLOGY | Facility: HOSPITAL | Age: 79
End: 2024-09-13
Payer: COMMERCIAL

## 2024-09-13 VITALS
WEIGHT: 163 LBS | SYSTOLIC BLOOD PRESSURE: 138 MMHG | HEIGHT: 70 IN | DIASTOLIC BLOOD PRESSURE: 84 MMHG | BODY MASS INDEX: 23.34 KG/M2 | HEART RATE: 74 BPM

## 2024-09-13 DIAGNOSIS — E83.52 HYPERCALCEMIA: ICD-10-CM

## 2024-09-13 DIAGNOSIS — N25.81 SECONDARY HYPERPARATHYROIDISM OF RENAL ORIGIN (HCC): ICD-10-CM

## 2024-09-13 DIAGNOSIS — I10 ESSENTIAL HYPERTENSION: Primary | ICD-10-CM

## 2024-09-13 DIAGNOSIS — E55.9 VITAMIN D DEFICIENCY: ICD-10-CM

## 2024-09-13 DIAGNOSIS — N18.31 CHRONIC KIDNEY DISEASE, STAGE 3A (HCC): ICD-10-CM

## 2024-09-13 PROCEDURE — 99214 OFFICE O/P EST MOD 30 MIN: CPT | Performed by: NURSE PRACTITIONER

## 2024-09-13 RX ORDER — ZINC GLUCONATE 50 MG
50 TABLET ORAL DAILY
COMMUNITY

## 2024-09-13 RX ORDER — VITAMIN B COMPLEX
1 CAPSULE ORAL DAILY
COMMUNITY

## 2024-09-13 RX ORDER — MAGNESIUM L-LACTATE 84 MG
84 TABLET, EXTENDED RELEASE ORAL DAILY
COMMUNITY

## 2024-09-13 NOTE — PATIENT INSTRUCTIONS
Thank you for the kind visit>  You have stable chronic kidney disease.    Recommendations:  Follow-up in 1 year  Blood work and urine studies prior to the appointment  Avoid NSAIDs such as Motrin Aleve ibuprofen and Advil  You can take Tylenol for control of pain  If you have a concern about the safety of medication call our office or asked the pharmacist  Follow up with PCP and possibly urology regarding prostate/urinary frequency  Increase intake of phosphorus containing foods.  Written information given.

## 2024-09-27 LAB — COLOGUARD RESULT REPORTABLE: NEGATIVE

## 2024-09-30 ENCOUNTER — TELEPHONE (OUTPATIENT)
Dept: FAMILY MEDICINE CLINIC | Facility: CLINIC | Age: 79
End: 2024-09-30

## 2024-09-30 NOTE — TELEPHONE ENCOUNTER
----- Message from Juanita Berry DO sent at 9/29/2024  1:44 PM EDT -----  Please call patient regarding his negative Cologuard  Please chart and health maintenance.      Patient was informed.

## 2024-10-12 DIAGNOSIS — I10 ESSENTIAL HYPERTENSION: ICD-10-CM

## 2024-10-14 RX ORDER — LOSARTAN POTASSIUM 50 MG/1
50 TABLET ORAL 2 TIMES DAILY
Qty: 180 TABLET | Refills: 0 | Status: SHIPPED | OUTPATIENT
Start: 2024-10-14

## 2024-10-14 RX ORDER — CARVEDILOL 6.25 MG/1
6.25 TABLET ORAL 2 TIMES DAILY WITH MEALS
Qty: 180 TABLET | Refills: 0 | Status: SHIPPED | OUTPATIENT
Start: 2024-10-14

## 2024-12-02 ENCOUNTER — TELEPHONE (OUTPATIENT)
Dept: FAMILY MEDICINE CLINIC | Facility: CLINIC | Age: 79
End: 2024-12-02

## 2024-12-02 NOTE — TELEPHONE ENCOUNTER
LMOVM for pt to come in 15 min earlier on 12/17.  Appt was originally 11:45 - move up to 11:30 because of lunch time conflict.

## 2024-12-16 ENCOUNTER — TELEPHONE (OUTPATIENT)
Dept: NEPHROLOGY | Facility: CLINIC | Age: 79
End: 2024-12-16

## 2024-12-16 NOTE — TELEPHONE ENCOUNTER
Called patient and spoke with regarding a follow up with Dr. Momin from [facility].   I scheduled in [place], Friday 9/12/2025 at 8:00 Am.   Reminded of labs.  mailing an appointment card

## 2025-02-27 ENCOUNTER — TELEPHONE (OUTPATIENT)
Age: 80
End: 2025-02-27

## 2025-02-27 DIAGNOSIS — E55.9 VITAMIN D DEFICIENCY: ICD-10-CM

## 2025-02-27 DIAGNOSIS — I10 ESSENTIAL HYPERTENSION: Primary | ICD-10-CM

## 2025-02-27 NOTE — TELEPHONE ENCOUNTER
Spoke with patient regarding the labs. He no longer goes to [facility]. I will remove that, please enter the blood script for [facility]. Sorry for the double work.

## 2025-02-27 NOTE — TELEPHONE ENCOUNTER
Patient call about getting some lab order done and would like his doctor Juanita Berry can add a script in for the patient to get his lab work done. Thank you

## 2025-02-28 ENCOUNTER — APPOINTMENT (OUTPATIENT)
Dept: LAB | Facility: CLINIC | Age: 80
End: 2025-02-28
Payer: COMMERCIAL

## 2025-02-28 DIAGNOSIS — I10 ESSENTIAL HYPERTENSION: ICD-10-CM

## 2025-02-28 DIAGNOSIS — E55.9 VITAMIN D DEFICIENCY: ICD-10-CM

## 2025-02-28 LAB
25(OH)D3 SERPL-MCNC: 49.7 NG/ML (ref 30–100)
ALBUMIN SERPL BCG-MCNC: 4.2 G/DL (ref 3.5–5)
ALP SERPL-CCNC: 73 U/L (ref 34–104)
ALT SERPL W P-5'-P-CCNC: 15 U/L (ref 7–52)
ANION GAP SERPL CALCULATED.3IONS-SCNC: 9 MMOL/L (ref 4–13)
AST SERPL W P-5'-P-CCNC: 19 U/L (ref 13–39)
BASOPHILS # BLD AUTO: 0.05 THOUSANDS/ÂΜL (ref 0–0.1)
BASOPHILS NFR BLD AUTO: 1 % (ref 0–1)
BILIRUB SERPL-MCNC: 0.66 MG/DL (ref 0.2–1)
BILIRUB UR QL STRIP: NEGATIVE
BUN SERPL-MCNC: 20 MG/DL (ref 5–25)
CALCIUM SERPL-MCNC: 10.5 MG/DL (ref 8.4–10.2)
CHLORIDE SERPL-SCNC: 106 MMOL/L (ref 96–108)
CLARITY UR: CLEAR
CO2 SERPL-SCNC: 27 MMOL/L (ref 21–32)
COLOR UR: NORMAL
CREAT SERPL-MCNC: 1.39 MG/DL (ref 0.6–1.3)
EOSINOPHIL # BLD AUTO: 0.2 THOUSAND/ÂΜL (ref 0–0.61)
EOSINOPHIL NFR BLD AUTO: 3 % (ref 0–6)
ERYTHROCYTE [DISTWIDTH] IN BLOOD BY AUTOMATED COUNT: 13.1 % (ref 11.6–15.1)
GFR SERPL CREATININE-BSD FRML MDRD: 47 ML/MIN/1.73SQ M
GLUCOSE P FAST SERPL-MCNC: 92 MG/DL (ref 65–99)
GLUCOSE UR STRIP-MCNC: NEGATIVE MG/DL
HCT VFR BLD AUTO: 48 % (ref 36.5–49.3)
HGB BLD-MCNC: 15.6 G/DL (ref 12–17)
HGB UR QL STRIP.AUTO: NEGATIVE
IMM GRANULOCYTES # BLD AUTO: 0.02 THOUSAND/UL (ref 0–0.2)
IMM GRANULOCYTES NFR BLD AUTO: 0 % (ref 0–2)
KETONES UR STRIP-MCNC: NEGATIVE MG/DL
LEUKOCYTE ESTERASE UR QL STRIP: NEGATIVE
LYMPHOCYTES # BLD AUTO: 2 THOUSANDS/ÂΜL (ref 0.6–4.47)
LYMPHOCYTES NFR BLD AUTO: 27 % (ref 14–44)
MCH RBC QN AUTO: 30.3 PG (ref 26.8–34.3)
MCHC RBC AUTO-ENTMCNC: 32.5 G/DL (ref 31.4–37.4)
MCV RBC AUTO: 93 FL (ref 82–98)
MONOCYTES # BLD AUTO: 0.56 THOUSAND/ÂΜL (ref 0.17–1.22)
MONOCYTES NFR BLD AUTO: 8 % (ref 4–12)
NEUTROPHILS # BLD AUTO: 4.6 THOUSANDS/ÂΜL (ref 1.85–7.62)
NEUTS SEG NFR BLD AUTO: 61 % (ref 43–75)
NITRITE UR QL STRIP: NEGATIVE
NRBC BLD AUTO-RTO: 0 /100 WBCS
PH UR STRIP.AUTO: 6.5 [PH]
PLATELET # BLD AUTO: 260 THOUSANDS/UL (ref 149–390)
PMV BLD AUTO: 10.6 FL (ref 8.9–12.7)
POTASSIUM SERPL-SCNC: 4.8 MMOL/L (ref 3.5–5.3)
PROT SERPL-MCNC: 6.8 G/DL (ref 6.4–8.4)
PROT UR STRIP-MCNC: NEGATIVE MG/DL
RBC # BLD AUTO: 5.15 MILLION/UL (ref 3.88–5.62)
SODIUM SERPL-SCNC: 142 MMOL/L (ref 135–147)
SP GR UR STRIP.AUTO: 1.01 (ref 1–1.03)
T4 FREE SERPL-MCNC: 0.86 NG/DL (ref 0.61–1.12)
TSH SERPL DL<=0.05 MIU/L-ACNC: 5.96 UIU/ML (ref 0.45–4.5)
UROBILINOGEN UR STRIP-ACNC: <2 MG/DL
WBC # BLD AUTO: 7.43 THOUSAND/UL (ref 4.31–10.16)

## 2025-02-28 PROCEDURE — 85025 COMPLETE CBC W/AUTO DIFF WBC: CPT

## 2025-02-28 PROCEDURE — 80053 COMPREHEN METABOLIC PANEL: CPT

## 2025-02-28 PROCEDURE — 84443 ASSAY THYROID STIM HORMONE: CPT

## 2025-02-28 PROCEDURE — 82306 VITAMIN D 25 HYDROXY: CPT

## 2025-02-28 PROCEDURE — 81003 URINALYSIS AUTO W/O SCOPE: CPT

## 2025-02-28 PROCEDURE — 36415 COLL VENOUS BLD VENIPUNCTURE: CPT

## 2025-02-28 PROCEDURE — 84439 ASSAY OF FREE THYROXINE: CPT

## 2025-03-04 ENCOUNTER — OFFICE VISIT (OUTPATIENT)
Dept: FAMILY MEDICINE CLINIC | Facility: CLINIC | Age: 80
End: 2025-03-04
Payer: COMMERCIAL

## 2025-03-04 VITALS
WEIGHT: 161.7 LBS | HEIGHT: 70 IN | HEART RATE: 80 BPM | DIASTOLIC BLOOD PRESSURE: 78 MMHG | SYSTOLIC BLOOD PRESSURE: 122 MMHG | OXYGEN SATURATION: 98 % | BODY MASS INDEX: 23.15 KG/M2 | RESPIRATION RATE: 15 BRPM | TEMPERATURE: 97.9 F

## 2025-03-04 DIAGNOSIS — M85.89 OSTEOPENIA OF MULTIPLE SITES: ICD-10-CM

## 2025-03-04 DIAGNOSIS — R45.0 NERVOUSNESS: ICD-10-CM

## 2025-03-04 DIAGNOSIS — N18.31 CHRONIC KIDNEY DISEASE, STAGE 3A (HCC): ICD-10-CM

## 2025-03-04 DIAGNOSIS — Z00.00 MEDICARE ANNUAL WELLNESS VISIT, SUBSEQUENT: ICD-10-CM

## 2025-03-04 DIAGNOSIS — E55.9 VITAMIN D DEFICIENCY: ICD-10-CM

## 2025-03-04 DIAGNOSIS — E04.1 NONTOXIC SINGLE THYROID NODULE: ICD-10-CM

## 2025-03-04 DIAGNOSIS — Z00.00 MEDICARE ANNUAL WELLNESS VISIT, INITIAL: ICD-10-CM

## 2025-03-04 DIAGNOSIS — N25.81 SECONDARY HYPERPARATHYROIDISM OF RENAL ORIGIN (HCC): ICD-10-CM

## 2025-03-04 DIAGNOSIS — I10 ESSENTIAL HYPERTENSION: Primary | ICD-10-CM

## 2025-03-04 DIAGNOSIS — E83.52 HYPERCALCEMIA: ICD-10-CM

## 2025-03-04 PROCEDURE — 93000 ELECTROCARDIOGRAM COMPLETE: CPT | Performed by: FAMILY MEDICINE

## 2025-03-04 PROCEDURE — G2211 COMPLEX E/M VISIT ADD ON: HCPCS | Performed by: FAMILY MEDICINE

## 2025-03-04 PROCEDURE — G0439 PPPS, SUBSEQ VISIT: HCPCS | Performed by: FAMILY MEDICINE

## 2025-03-04 PROCEDURE — 99214 OFFICE O/P EST MOD 30 MIN: CPT | Performed by: FAMILY MEDICINE

## 2025-03-04 RX ORDER — LOSARTAN POTASSIUM 50 MG/1
50 TABLET ORAL 2 TIMES DAILY
Qty: 180 TABLET | Refills: 0 | Status: SHIPPED | OUTPATIENT
Start: 2025-03-04

## 2025-03-04 NOTE — PATIENT INSTRUCTIONS
Please schedule your ultrasound of your thyroid and have this done, we will call with the results    Please continue at least your vitamin D and can consider taking calcium because taking it by mouth does not make it go hide annual blood    Please get nonfasting blood work done a week or so before you come back next visit    Come back sooner if needed  Medicare Preventive Visit Patient Instructions  Thank you for completing your Welcome to Medicare Visit or Medicare Annual Wellness Visit today. Your next wellness visit will be due in one year (3/7/2026).  The screening/preventive services that you may require over the next 5-10 years are detailed below. Some tests may not apply to you based off risk factors and/or age. Screening tests ordered at today's visit but not completed yet may show as past due. Also, please note that scanned in results may not display below.  Preventive Screenings:  Service Recommendations Previous Testing/Comments   Colorectal Cancer Screening  Colonoscopy    Fecal Occult Blood Test (FOBT)/Fecal Immunochemical Test (FIT)  Fecal DNA/Cologuard Test  Flexible Sigmoidoscopy Age: 45-75 years old   Colonoscopy: every 10 years (May be performed more frequently if at higher risk)  OR  FOBT/FIT: every 1 year  OR  Cologuard: every 3 years  OR  Sigmoidoscopy: every 5 years  Screening may be recommended earlier than age 45 if at higher risk for colorectal cancer. Also, an individualized decision between you and your healthcare provider will decide whether screening between the ages of 76-85 would be appropriate. Colonoscopy: Not on file  FOBT/FIT: Not on file  Cologuard: Not on file  Sigmoidoscopy: Not on file          Prostate Cancer Screening Individualized decision between patient and health care provider in men between ages of 55-69   Medicare will cover every 12 months beginning on the day after your 50th birthday PSA: 1.3 ng/mL     Screening Not Indicated     Hepatitis C Screening Once for adults  born between 1945 and 1965  More frequently in patients at high risk for Hepatitis C Hep C Antibody: Not on file        Diabetes Screening 1-2 times per year if you're at risk for diabetes or have pre-diabetes Fasting glucose: 92 mg/dL (2/28/2025)  A1C: No results in last 5 years (No results in last 5 years)  Screening Current   Cholesterol Screening Once every 5 years if you don't have a lipid disorder. May order more often based on risk factors. Lipid panel: 01/16/2023  Screening Current      Other Preventive Screenings Covered by Medicare:  Abdominal Aortic Aneurysm (AAA) Screening: covered once if your at risk. You're considered to be at risk if you have a family history of AAA or a male between the age of 65-75 who smoking at least 100 cigarettes in your lifetime.  Lung Cancer Screening: covers low dose CT scan once per year if you meet all of the following conditions: (1) Age 55-77; (2) No signs or symptoms of lung cancer; (3) Current smoker or have quit smoking within the last 15 years; (4) You have a tobacco smoking history of at least 20 pack years (packs per day x number of years you smoked); (5) You get a written order from a healthcare provider.  Glaucoma Screening: covered annually if you're considered high risk: (1) You have diabetes OR (2) Family history of glaucoma OR (3)  aged 50 and older OR (4)  American aged 65 and older  Osteoporosis Screening: covered every 2 years if you meet one of the following conditions: (1) Have a vertebral abnormality; (2) On glucocorticoid therapy for more than 3 months; (3) Have primary hyperparathyroidism; (4) On osteoporosis medications and need to assess response to drug therapy.  HIV Screening: covered annually if you're between the age of 15-65. Also covered annually if you are younger than 15 and older than 65 with risk factors for HIV infection. For pregnant patients, it is covered up to 3 times per  pregnancy.    Immunizations:  Immunization Recommendations   Influenza Vaccine Annual influenza vaccination during flu season is recommended for all persons aged >= 6 months who do not have contraindications   Pneumococcal Vaccine   * Pneumococcal conjugate vaccine = PCV13 (Prevnar 13), PCV15 (Vaxneuvance), PCV20 (Prevnar 20)  * Pneumococcal polysaccharide vaccine = PPSV23 (Pneumovax) Adults 19-63 yo with certain risk factors or if 65+ yo  If never received any pneumonia vaccine: recommend Prevnar 20 (PCV20)  Give PCV20 if previously received 1 dose of PCV13 or PPSV23   Hepatitis B Vaccine 3 dose series if at intermediate or high risk (ex: diabetes, end stage renal disease, liver disease)   Respiratory syncytial virus (RSV) Vaccine - COVERED BY MEDICARE PART D  * RSVPreF3 (Arexvy) CDC recommends that adults 60 years of age and older may receive a single dose of RSV vaccine using shared clinical decision-making (SCDM)   Tetanus (Td) Vaccine - COST NOT COVERED BY MEDICARE PART B Following completion of primary series, a booster dose should be given every 10 years to maintain immunity against tetanus. Td may also be given as tetanus wound prophylaxis.   Tdap Vaccine - COST NOT COVERED BY MEDICARE PART B Recommended at least once for all adults. For pregnant patients, recommended with each pregnancy.   Shingles Vaccine (Shingrix) - COST NOT COVERED BY MEDICARE PART B  2 shot series recommended in those 19 years and older who have or will have weakened immune systems or those 50 years and older     Health Maintenance Due:  There are no preventive care reminders to display for this patient.  Immunizations Due:      Topic Date Due   • Influenza Vaccine (1) 09/01/2024   • COVID-19 Vaccine (5 - 2024-25 season) 09/01/2024     Advance Directives   What are advance directives?  Advance directives are legal documents that state your wishes and plans for medical care. These plans are made ahead of time in case you lose your  ability to make decisions for yourself. Advance directives can apply to any medical decision, such as the treatments you want, and if you want to donate organs.   What are the types of advance directives?  There are many types of advance directives, and each state has rules about how to use them. You may choose a combination of any of the following:  Living will:  This is a written record of the treatment you want. You can also choose which treatments you do not want, which to limit, and which to stop at a certain time. This includes surgery, medicine, IV fluid, and tube feedings.   Durable power of  for healthcare (DPAHC):  This is a written record that states who you want to make healthcare choices for you when you are unable to make them for yourself. This person, called a proxy, is usually a family member or a friend. You may choose more than 1 proxy.  Do not resuscitate (DNR) order:  A DNR order is used in case your heart stops beating or you stop breathing. It is a request not to have certain forms of treatment, such as CPR. A DNR order may be included in other types of advance directives.  Medical directive:  This covers the care that you want if you are in a coma, near death, or unable to make decisions for yourself. You can list the treatments you want for each condition. Treatment may include pain medicine, surgery, blood transfusions, dialysis, IV or tube feedings, and a ventilator (breathing machine).  Values history:  This document has questions about your views, beliefs, and how you feel and think about life. This information can help others choose the care that you would choose.  Why are advance directives important?  An advance directive helps you control your care. Although spoken wishes may be used, it is better to have your wishes written down. Spoken wishes can be misunderstood, or not followed. Treatments may be given even if you do not want them. An advance directive may make it easier  for your family to make difficult choices about your care.   Fall Prevention    Fall prevention  includes ways to make your home and other areas safer. It also includes ways you can move more carefully to prevent a fall. Health conditions that cause changes in your blood pressure, vision, or muscle strength and coordination may increase your risk for falls. Medicines may also increase your risk for falls if they make you dizzy, weak, or sleepy.   Fall prevention tips:   Stand or sit up slowly.    Use assistive devices as directed.    Wear shoes that fit well and have soles that .    Wear a personal alarm.    Stay active.    Manage your medical conditions.    Home Safety Tips:  Add items to prevent falls in the bathroom.    Keep paths clear.    Install bright lights in your home.    Keep items you use often on shelves within reach.    Paint or place reflective tape on the edges of your stairs.       © Copyright [employer] 2018 Information is for End User's use only and may not be sold, redistributed or otherwise used for commercial purposes. All illustrations and images included in CareNotes® are the copyrighted property of A.D.A.M., Inc. or [employer]

## 2025-03-04 NOTE — ASSESSMENT & PLAN NOTE
Orders:    POCT ECG    losartan (COZAAR) 50 mg tablet; Take 1 tablet (50 mg total) by mouth 2 (two) times a day    TSH, 3rd generation with Free T4 reflex; Future    Comprehensive metabolic panel; Future    
  Orders:    TSH, 3rd generation with Free T4 reflex; Future    
Lab Results   Component Value Date    EGFR 47 02/28/2025    EGFR 49 09/06/2024    EGFR 53 09/07/2023    CREATININE 1.39 (H) 02/28/2025    CREATININE 1.34 (H) 09/06/2024    CREATININE 1.27 09/07/2023            
no

## 2025-03-04 NOTE — PROGRESS NOTES
Name: Ang Triana      : 1945      MRN: [id number]  Encounter Provider: Juanita Berry DO  Encounter Date: 3/4/2025   Encounter department: [facility]      Assessment & Plan  Chronic kidney disease, stage 3a (HCC)  Lab Results   Component Value Date    EGFR 47 2025    EGFR 49 2024    EGFR 53 2023    CREATININE 1.39 (H) 2025    CREATININE 1.34 (H) 2024    CREATININE 1.27 2023            Essential hypertension    Orders:    POCT ECG    losartan (COZAAR) 50 mg tablet; Take 1 tablet (50 mg total) by mouth 2 (two) times a day    TSH, 3rd generation with Free T4 reflex; Future    Comprehensive metabolic panel; Future    Hypercalcemia         Nervousness         Nontoxic single thyroid nodule    Orders:    TSH, 3rd generation with Free T4 reflex; Future    Secondary hyperparathyroidism of renal origin (HCC)         Vitamin D deficiency         Osteopenia of multiple sites    Orders:    calcium carbonate (TUMS ULTRA) 1000 MG chewable tablet; Chew 1 tablet (1,000 mg total) 2 (two) times a day    Medicare annual wellness visit, initial         Medicare annual wellness visit, subsequent             Hypertension  Blood pressure excellent 122/78  Continue losartan and Coreg  EKG today     General anxiety disorder  No medicine at this time  Stopped Lexapro and has been fine since that time      Chronic kidney disease stage III AAA   GFR stable at 47  Continue to watch this at least every 6 months  Also follows with nephrology     Thyroid nodules  2 thyroids biopsies  Both scant cells  Cells they are benign  Patient has not gotten his thyroid ultrasound in a year, will think about it again.  Does not like having thyroid biopsies     Hypercalcemia  Calcium is controlled at this time and being watched by nephrology     Vitamin D deficiency  Patient is taking vitamin D daily 2000 units  Vitamin D is good at 49     Urinary stream slowing  Secondary  "to BPH  Patient is not on Flomax because now he has no problem with his stream     Osteopenia  Patient never started alendronate or calcium  Next DEXA 2025 September        Recheck in 6 months or sooner if needed  Check kidney test  We will order his DEXA next visit  Hopefully patient will get ultrasound of thyroid in the meantime  Recheck calcium and thyroid before next visit        Depression Screening and Follow-up Plan: Patient was screened for depression during today's encounter. They screened negative with a PHQ-2 score of 2.        History of Present Illness   Patient is here for 6-month recheck on his blood pressure and to go over his blood work  Patient never started the alendronate calcium or calcium but is taking his vitamin D  Patient feels good but like sitting around      Review of Systems  Negative x 12  Objective   /78   Pulse 80   Temp 97.9 °F (36.6 °C)   Resp 15   Ht 5' 9.5\" (1.765 m)   Wt 73.3 kg (161 lb 11.2 oz)   SpO2 98%   BMI 23.54 kg/m²      Physical Exam  Constitutional  Appears healthy, Looks well, Appearance consistent with age    Mental Status  Alert, Oriented, Cooperative, Memory function normal , clean, and reasonable    Neck  No neck mass, No thyromegaly, Good carotid upstrokes bilaterally, trachea midline positive click    Respiratory  Breath sounds normal, No rales, No rhonchi, No wheezing, normal palpation    Cardiac  Regular rhythm without ectopy or murmur no S3-S4, no heave lift or thrill to palpation    Vascular  No leg edema, No pedal edema    Muscular skeletal  No clubbing cyanosis , muscle tone normal    Skin  No appreciable rashes or abnormal appearing lesions    "

## 2025-03-04 NOTE — PROGRESS NOTES
Name: Ang Triana      : 1945      MRN: [id number]  Encounter Provider: Juanita Berry DO  Encounter Date: 3/4/2025   Encounter department: [facility]    Assessment & Plan       Preventive health issues were discussed with patient, and age appropriate screening tests were ordered as noted in patient's After Visit Summary. Personalized health advice and appropriate referrals for health education or preventive services given if needed, as noted in patient's After Visit Summary.    History of Present Illness     HPI   Patient Care Team:  Juanita Berry DO as PCP - General  Juanita Berry DO as PCP - PCP-[facility] Attributed-Roster  Karan Momin DO (Nephrology)  ERLIN Ross (Nephrology)    Review of Systems  Medical History Reviewed by provider this encounter:  Tobacco  Allergies  Meds  Problems  Med Hx  Surg Hx  Fam Hx       Annual Wellness Visit Questionnaire   Ang is here for his Subsequent Wellness visit.     Health Risk Assessment:   Patient rates overall health as good. Patient feels that their physical health rating is slightly worse. Patient is very satisfied with their life. Eyesight was rated as slightly worse. Hearing was rated as slightly worse. Patient feels that their emotional and mental health rating is same. Patients states they are never, rarely angry. Patient states they are often unusually tired/fatigued. Pain experienced in the last 7 days has been a lot. Patient's pain rating has been 9/10. Patient states that he has experienced weight loss or gain in last 6 months.     Depression Screening:   PHQ-2 Score: 2      Fall Risk Screening:   In the past year, patient has experienced: history of falling in past year      Home Safety:  Patient does not have trouble with stairs inside or outside of their home. Patient has working smoke alarms and has working carbon monoxide detector. Home safety hazards include: not having  non-slip bath and/or shower mats.     Nutrition:   Current diet is No Added Salt.     Medications:   Patient is not currently taking any over-the-counter supplements. Patient is able to manage medications.     Activities of Daily Living (ADLs)/Instrumental Activities of Daily Living (IADLs):   Walk and transfer into and out of bed and chair?: No  Dress and groom yourself?: Yes    Bathe or shower yourself?: Yes    Feed yourself? Yes  Do your laundry/housekeeping?: Yes  Manage your money, pay your bills and track your expenses?: Yes  Make your own meals?: Yes    Do your own shopping?: Yes    Previous Hospitalizations:   Any hospitalizations or ED visits within the last 12 months?: No      Advance Care Planning:   Living will: No    Durable POA for healthcare: No    Advanced directive: No      PREVENTIVE SCREENINGS      Cardiovascular Screening:    General: Screening Current      Diabetes Screening:     General: Screening Current      Prostate Cancer Screening:    General: Screening Not Indicated      Abdominal Aortic Aneurysm (AAA) Screening:    Risk factors include: tobacco use        Lung Cancer Screening:     General: Screening Not Indicated    Screening, Brief Intervention, and Referral to Treatment (SBIRT)     Screening  Typical number of drinks in a day: 1  Typical number of drinks in a week: 2  Interpretation: Low risk drinking behavior.    AUDIT-C Screenin) How often did you have a drink containing alcohol in the past year? 2 to 4 times a month  2) How many drinks did you have on a typical day when you were drinking in the past year? 1 to 2  3) How often did you have 6 or more drinks on one occasion in the past year? never    AUDIT-C Score: 2  Interpretation: Score 0-3 (male): Negative screen for alcohol misuse    Single Item Drug Screening:  How often have you used an illegal drug (including marijuana) or a prescription medication for non-medical reasons in the past year? never    Single Item Drug Screen  "Score: 0  Interpretation: Negative screen for possible drug use disorder    Social Drivers of Health     Financial Resource Strain: Low Risk  (7/3/2023)    Overall Financial Resource Strain (CARDIA)     Difficulty of Paying Living Expenses: Not very hard   Food Insecurity: No Food Insecurity (2/28/2025)    Hunger Vital Sign     Worried About Running Out of Food in the Last Year: Never true     Ran Out of Food in the Last Year: Never true   Transportation Needs: No Transportation Needs (2/28/2025)    PRAPARE - Transportation     Lack of Transportation (Medical): No     Lack of Transportation (Non-Medical): No   Housing Stability: Low Risk  (2/28/2025)    Housing Stability Vital Sign     Unable to Pay for Housing in the Last Year: No     Number of Times Moved in the Last Year: 0     Homeless in the Last Year: No   Utilities: Not At Risk (2/28/2025)    [facility] Utilities     Threatened with loss of utilities: No     No results found.    Objective   /78   Pulse 80   Temp 97.9 °F (36.6 °C)   Resp 15   Ht 5' 9.5\" (1.765 m)   Wt 73.3 kg (161 lb 11.2 oz)   SpO2 98%   BMI 23.54 kg/m²     Physical Exam    "

## 2025-03-14 DIAGNOSIS — I10 ESSENTIAL HYPERTENSION: ICD-10-CM

## 2025-03-14 RX ORDER — CARVEDILOL 6.25 MG/1
6.25 TABLET ORAL 2 TIMES DAILY WITH MEALS
Qty: 180 TABLET | Refills: 1 | Status: SHIPPED | OUTPATIENT
Start: 2025-03-14

## 2025-03-28 ENCOUNTER — DOCUMENTATION (OUTPATIENT)
Dept: ADMINISTRATIVE | Facility: OTHER | Age: 80
End: 2025-03-28

## 2025-03-28 NOTE — PROGRESS NOTES
03/28/25 3:34 PM    Annual Wellness Visit outreach is not required, an AWV was completed at the PCP office.    Thank you.  Shayne Carmen MA  [facility]

## 2025-06-07 DIAGNOSIS — I10 ESSENTIAL HYPERTENSION: ICD-10-CM

## 2025-06-08 RX ORDER — LOSARTAN POTASSIUM 50 MG/1
50 TABLET ORAL 2 TIMES DAILY
Qty: 180 TABLET | Refills: 1 | Status: SHIPPED | OUTPATIENT
Start: 2025-06-08

## 2025-07-08 ENCOUNTER — HOSPITAL ENCOUNTER (OUTPATIENT)
Dept: ULTRASOUND IMAGING | Facility: HOSPITAL | Age: 80
Discharge: HOME | End: 2025-07-08
Payer: COMMERCIAL

## 2025-07-08 DIAGNOSIS — E04.1 NONTOXIC SINGLE THYROID NODULE: ICD-10-CM

## 2025-07-08 PROCEDURE — 76536 US EXAM OF HEAD AND NECK: CPT
